# Patient Record
Sex: FEMALE | Race: BLACK OR AFRICAN AMERICAN | NOT HISPANIC OR LATINO | Employment: UNEMPLOYED | URBAN - METROPOLITAN AREA
[De-identification: names, ages, dates, MRNs, and addresses within clinical notes are randomized per-mention and may not be internally consistent; named-entity substitution may affect disease eponyms.]

---

## 2021-04-12 ENCOUNTER — IMMUNIZATION (OUTPATIENT)
Dept: PHARMACY | Facility: CLINIC | Age: 46
End: 2021-04-12
Payer: MEDICAID

## 2021-04-12 DIAGNOSIS — Z23 NEED FOR VACCINATION: Primary | ICD-10-CM

## 2021-05-10 ENCOUNTER — IMMUNIZATION (OUTPATIENT)
Dept: PHARMACY | Facility: CLINIC | Age: 46
End: 2021-05-10
Payer: MEDICAID

## 2021-05-10 DIAGNOSIS — Z23 NEED FOR VACCINATION: Primary | ICD-10-CM

## 2021-05-28 DIAGNOSIS — Z12.31 SCREENING MAMMOGRAM, ENCOUNTER FOR: Primary | ICD-10-CM

## 2021-06-02 ENCOUNTER — HOSPITAL ENCOUNTER (OUTPATIENT)
Dept: RADIOLOGY | Facility: HOSPITAL | Age: 46
Discharge: HOME | End: 2021-06-02
Attending: NURSE PRACTITIONER
Payer: MEDICAID

## 2021-06-02 VITALS — WEIGHT: 205 LBS | HEIGHT: 72 IN | BODY MASS INDEX: 27.77 KG/M2

## 2021-06-02 DIAGNOSIS — Z12.31 SCREENING MAMMOGRAM, ENCOUNTER FOR: ICD-10-CM

## 2021-06-02 PROCEDURE — 77063 BREAST TOMOSYNTHESIS BI: CPT | Mod: 26,,, | Performed by: RADIOLOGY

## 2021-06-02 PROCEDURE — 77063 MAMMO DIGITAL SCREENING BILAT WITH TOMO: ICD-10-PCS | Mod: 26,,, | Performed by: RADIOLOGY

## 2021-06-02 PROCEDURE — 77067 MAMMO DIGITAL SCREENING BILAT WITH TOMO: ICD-10-PCS | Mod: 26,,, | Performed by: RADIOLOGY

## 2021-06-02 PROCEDURE — 77067 SCR MAMMO BI INCL CAD: CPT | Mod: TC

## 2021-06-02 PROCEDURE — 77067 SCR MAMMO BI INCL CAD: CPT | Mod: 26,,, | Performed by: RADIOLOGY

## 2021-08-04 DIAGNOSIS — Z13.1 SCREENING FOR DIABETES MELLITUS: ICD-10-CM

## 2021-08-04 DIAGNOSIS — I65.29 INTERNAL CAROTID ARTERY OCCLUSION, UNSPECIFIED LATERALITY: Primary | ICD-10-CM

## 2021-08-06 DIAGNOSIS — E05.90 HYPERTHYROIDISM: Primary | ICD-10-CM

## 2021-09-21 PROBLEM — G44.209 TENSION HEADACHE: Status: ACTIVE | Noted: 2021-09-21

## 2021-09-21 PROBLEM — M54.2 NECK PAIN: Status: ACTIVE | Noted: 2021-09-21

## 2021-09-21 PROBLEM — G44.86 CERVICOGENIC HEADACHE: Status: ACTIVE | Noted: 2021-09-21

## 2021-12-16 PROBLEM — E55.9 VITAMIN D DEFICIENCY: Chronic | Status: ACTIVE | Noted: 2021-12-16

## 2021-12-16 PROBLEM — G44.86 CERVICOGENIC HEADACHE: Status: RESOLVED | Noted: 2021-09-21 | Resolved: 2021-12-16

## 2021-12-16 PROBLEM — F41.9 ANXIETY: Chronic | Status: ACTIVE | Noted: 2021-12-16

## 2021-12-16 PROBLEM — J32.9 CHRONIC SINUSITIS: Chronic | Status: ACTIVE | Noted: 2021-12-16

## 2021-12-16 PROBLEM — G44.209 TENSION HEADACHE: Status: RESOLVED | Noted: 2021-09-21 | Resolved: 2021-12-16

## 2021-12-16 PROBLEM — M54.2 NECK PAIN: Status: RESOLVED | Noted: 2021-09-21 | Resolved: 2021-12-16

## 2021-12-16 PROBLEM — E78.00 PURE HYPERCHOLESTEROLEMIA: Chronic | Status: ACTIVE | Noted: 2021-12-16

## 2021-12-16 PROBLEM — K21.9 GASTROESOPHAGEAL REFLUX DISEASE WITHOUT ESOPHAGITIS: Chronic | Status: ACTIVE | Noted: 2021-12-16

## 2021-12-16 PROBLEM — E11.65 UNCONTROLLED TYPE 2 DIABETES MELLITUS WITH HYPERGLYCEMIA: Chronic | Status: ACTIVE | Noted: 2021-12-16

## 2021-12-16 PROBLEM — G43.009 MIGRAINE WITHOUT AURA AND WITHOUT STATUS MIGRAINOSUS, NOT INTRACTABLE: Chronic | Status: ACTIVE | Noted: 2021-12-16

## 2021-12-29 ENCOUNTER — IMMUNIZATION (OUTPATIENT)
Dept: INTERNAL MEDICINE | Facility: CLINIC | Age: 46
End: 2021-12-29
Payer: MEDICAID

## 2021-12-29 DIAGNOSIS — Z23 NEED FOR VACCINATION: Primary | ICD-10-CM

## 2021-12-29 PROCEDURE — 0064A COVID-19, MRNA, LNP-S, PF, 100 MCG/0.25 ML DOSE VACCINE (MODERNA BOOSTER): CPT | Mod: PBBFAC,CV19

## 2022-01-05 PROBLEM — I10 PRIMARY HYPERTENSION: Chronic | Status: ACTIVE | Noted: 2022-01-05

## 2022-01-07 ENCOUNTER — TELEPHONE (OUTPATIENT)
Dept: DIABETES | Facility: CLINIC | Age: 47
End: 2022-01-07
Payer: MEDICAID

## 2022-01-07 NOTE — TELEPHONE ENCOUNTER
----- Message from Radha Riley sent at 1/7/2022  8:18 AM CST -----  Regarding: Sooner apointment  Contact: [phone]  Calling in regards to sooner appointment than scheduled. Please call

## 2022-02-21 ENCOUNTER — OFFICE VISIT (OUTPATIENT)
Dept: DIABETES | Facility: CLINIC | Age: 47
End: 2022-02-21
Payer: MEDICAID

## 2022-02-21 ENCOUNTER — OFFICE VISIT (OUTPATIENT)
Dept: SLEEP MEDICINE | Facility: CLINIC | Age: 47
End: 2022-02-21
Payer: MEDICAID

## 2022-02-21 VITALS
SYSTOLIC BLOOD PRESSURE: 122 MMHG | WEIGHT: 215.5 LBS | DIASTOLIC BLOOD PRESSURE: 72 MMHG | BODY MASS INDEX: 29.19 KG/M2 | HEIGHT: 72 IN | TEMPERATURE: 99 F | OXYGEN SATURATION: 98 % | HEART RATE: 92 BPM

## 2022-02-21 VITALS
DIASTOLIC BLOOD PRESSURE: 75 MMHG | HEART RATE: 91 BPM | BODY MASS INDEX: 29.18 KG/M2 | WEIGHT: 215.19 LBS | SYSTOLIC BLOOD PRESSURE: 115 MMHG

## 2022-02-21 DIAGNOSIS — E78.00 PURE HYPERCHOLESTEROLEMIA: Chronic | ICD-10-CM

## 2022-02-21 DIAGNOSIS — E89.0 HISTORY OF THYROIDECTOMY, TOTAL: ICD-10-CM

## 2022-02-21 DIAGNOSIS — E11.42 TYPE 2 DIABETES MELLITUS WITH DIABETIC POLYNEUROPATHY, WITH LONG-TERM CURRENT USE OF INSULIN: ICD-10-CM

## 2022-02-21 DIAGNOSIS — E66.3 OVERWEIGHT (BMI 25.0-29.9): ICD-10-CM

## 2022-02-21 DIAGNOSIS — E89.0 POSTOPERATIVE HYPOTHYROIDISM: ICD-10-CM

## 2022-02-21 DIAGNOSIS — Z71.9 HEALTH EDUCATION/COUNSELING: ICD-10-CM

## 2022-02-21 DIAGNOSIS — G47.10 HYPERSOMNOLENCE: Primary | ICD-10-CM

## 2022-02-21 DIAGNOSIS — Z79.4 TYPE 2 DIABETES MELLITUS WITH DIABETIC POLYNEUROPATHY, WITH LONG-TERM CURRENT USE OF INSULIN: ICD-10-CM

## 2022-02-21 DIAGNOSIS — E11.65 UNCONTROLLED TYPE 2 DIABETES MELLITUS WITH HYPERGLYCEMIA: Primary | Chronic | ICD-10-CM

## 2022-02-21 DIAGNOSIS — I10 PRIMARY HYPERTENSION: Chronic | ICD-10-CM

## 2022-02-21 DIAGNOSIS — R35.1 NOCTURIA: ICD-10-CM

## 2022-02-21 DIAGNOSIS — R06.83 SNORING: ICD-10-CM

## 2022-02-21 PROBLEM — Z98.890 HISTORY OF THYROIDECTOMY, TOTAL: Status: ACTIVE | Noted: 2022-02-21

## 2022-02-21 PROBLEM — Z90.89 HISTORY OF THYROIDECTOMY, TOTAL: Status: ACTIVE | Noted: 2022-02-21

## 2022-02-21 PROCEDURE — 99205 OFFICE O/P NEW HI 60 MIN: CPT | Mod: S$PBB,,, | Performed by: NURSE PRACTITIONER

## 2022-02-21 PROCEDURE — 4010F PR ACE/ARB THEARPY RXD/TAKEN: ICD-10-PCS | Mod: CPTII,,, | Performed by: INTERNAL MEDICINE

## 2022-02-21 PROCEDURE — 99213 PR OFFICE/OUTPT VISIT, EST, LEVL III, 20-29 MIN: ICD-10-PCS | Mod: S$PBB,,, | Performed by: INTERNAL MEDICINE

## 2022-02-21 PROCEDURE — 1159F PR MEDICATION LIST DOCUMENTED IN MEDICAL RECORD: ICD-10-PCS | Mod: CPTII,,, | Performed by: INTERNAL MEDICINE

## 2022-02-21 PROCEDURE — 99999 PR PBB SHADOW E&M-EST. PATIENT-LVL V: CPT | Mod: PBBFAC,,, | Performed by: NURSE PRACTITIONER

## 2022-02-21 PROCEDURE — 3078F PR MOST RECENT DIASTOLIC BLOOD PRESSURE < 80 MM HG: ICD-10-PCS | Mod: CPTII,,, | Performed by: INTERNAL MEDICINE

## 2022-02-21 PROCEDURE — 99213 OFFICE O/P EST LOW 20 MIN: CPT | Mod: S$PBB,,, | Performed by: INTERNAL MEDICINE

## 2022-02-21 PROCEDURE — 3074F SYST BP LT 130 MM HG: CPT | Mod: CPTII,,, | Performed by: NURSE PRACTITIONER

## 2022-02-21 PROCEDURE — 3008F PR BODY MASS INDEX (BMI) DOCUMENTED: ICD-10-PCS | Mod: CPTII,,, | Performed by: INTERNAL MEDICINE

## 2022-02-21 PROCEDURE — 3008F BODY MASS INDEX DOCD: CPT | Mod: CPTII,,, | Performed by: INTERNAL MEDICINE

## 2022-02-21 PROCEDURE — 99999 PR PBB SHADOW E&M-EST. PATIENT-LVL III: ICD-10-PCS | Mod: PBBFAC,,, | Performed by: INTERNAL MEDICINE

## 2022-02-21 PROCEDURE — 3074F PR MOST RECENT SYSTOLIC BLOOD PRESSURE < 130 MM HG: ICD-10-PCS | Mod: CPTII,,, | Performed by: INTERNAL MEDICINE

## 2022-02-21 PROCEDURE — 99213 OFFICE O/P EST LOW 20 MIN: CPT | Mod: PBBFAC,27 | Performed by: INTERNAL MEDICINE

## 2022-02-21 PROCEDURE — 1160F PR REVIEW ALL MEDS BY PRESCRIBER/CLIN PHARMACIST DOCUMENTED: ICD-10-PCS | Mod: CPTII,,, | Performed by: NURSE PRACTITIONER

## 2022-02-21 PROCEDURE — 99215 OFFICE O/P EST HI 40 MIN: CPT | Mod: PBBFAC,PN | Performed by: NURSE PRACTITIONER

## 2022-02-21 PROCEDURE — 4010F ACE/ARB THERAPY RXD/TAKEN: CPT | Mod: CPTII,,, | Performed by: NURSE PRACTITIONER

## 2022-02-21 PROCEDURE — 3074F SYST BP LT 130 MM HG: CPT | Mod: CPTII,,, | Performed by: INTERNAL MEDICINE

## 2022-02-21 PROCEDURE — 99999 PR PBB SHADOW E&M-EST. PATIENT-LVL V: ICD-10-PCS | Mod: PBBFAC,,, | Performed by: NURSE PRACTITIONER

## 2022-02-21 PROCEDURE — 99999 PR PBB SHADOW E&M-EST. PATIENT-LVL III: CPT | Mod: PBBFAC,,, | Performed by: INTERNAL MEDICINE

## 2022-02-21 PROCEDURE — 1160F RVW MEDS BY RX/DR IN RCRD: CPT | Mod: CPTII,,, | Performed by: NURSE PRACTITIONER

## 2022-02-21 PROCEDURE — 99205 PR OFFICE/OUTPT VISIT, NEW, LEVL V, 60-74 MIN: ICD-10-PCS | Mod: S$PBB,,, | Performed by: NURSE PRACTITIONER

## 2022-02-21 PROCEDURE — 1159F MED LIST DOCD IN RCRD: CPT | Mod: CPTII,,, | Performed by: NURSE PRACTITIONER

## 2022-02-21 PROCEDURE — 3078F DIAST BP <80 MM HG: CPT | Mod: CPTII,,, | Performed by: INTERNAL MEDICINE

## 2022-02-21 PROCEDURE — 3078F DIAST BP <80 MM HG: CPT | Mod: CPTII,,, | Performed by: NURSE PRACTITIONER

## 2022-02-21 PROCEDURE — 3078F PR MOST RECENT DIASTOLIC BLOOD PRESSURE < 80 MM HG: ICD-10-PCS | Mod: CPTII,,, | Performed by: NURSE PRACTITIONER

## 2022-02-21 PROCEDURE — 3074F PR MOST RECENT SYSTOLIC BLOOD PRESSURE < 130 MM HG: ICD-10-PCS | Mod: CPTII,,, | Performed by: NURSE PRACTITIONER

## 2022-02-21 PROCEDURE — 3008F PR BODY MASS INDEX (BMI) DOCUMENTED: ICD-10-PCS | Mod: CPTII,,, | Performed by: NURSE PRACTITIONER

## 2022-02-21 PROCEDURE — 1159F PR MEDICATION LIST DOCUMENTED IN MEDICAL RECORD: ICD-10-PCS | Mod: CPTII,,, | Performed by: NURSE PRACTITIONER

## 2022-02-21 PROCEDURE — 4010F ACE/ARB THERAPY RXD/TAKEN: CPT | Mod: CPTII,,, | Performed by: INTERNAL MEDICINE

## 2022-02-21 PROCEDURE — 4010F PR ACE/ARB THEARPY RXD/TAKEN: ICD-10-PCS | Mod: CPTII,,, | Performed by: NURSE PRACTITIONER

## 2022-02-21 PROCEDURE — 1159F MED LIST DOCD IN RCRD: CPT | Mod: CPTII,,, | Performed by: INTERNAL MEDICINE

## 2022-02-21 PROCEDURE — 3008F BODY MASS INDEX DOCD: CPT | Mod: CPTII,,, | Performed by: NURSE PRACTITIONER

## 2022-02-21 RX ORDER — MONTELUKAST SODIUM 10 MG/1
10 TABLET ORAL DAILY
COMMUNITY
Start: 2022-02-09 | End: 2022-03-07 | Stop reason: SDUPTHER

## 2022-02-21 RX ORDER — GLUCAGON 3 MG/1
POWDER NASAL
Qty: 2 EACH | Refills: 1 | Status: SHIPPED | OUTPATIENT
Start: 2022-02-21 | End: 2022-03-07 | Stop reason: SDUPTHER

## 2022-02-21 RX ORDER — INSULIN GLARGINE 100 [IU]/ML
16 INJECTION, SOLUTION SUBCUTANEOUS NIGHTLY
Qty: 15 ML | Refills: 6 | Status: SHIPPED | OUTPATIENT
Start: 2022-02-21 | End: 2022-03-07 | Stop reason: SDUPTHER

## 2022-02-21 RX ORDER — INSULIN ASPART 100 [IU]/ML
INJECTION, SOLUTION INTRAVENOUS; SUBCUTANEOUS
Qty: 15 ML | Refills: 6 | Status: SHIPPED | OUTPATIENT
Start: 2022-02-21 | End: 2022-03-07 | Stop reason: SDUPTHER

## 2022-02-21 NOTE — PROGRESS NOTES
CC:   Chief Complaint   Patient presents with    Diabetes Mellitus       HPI: Javier Enciso is a 47 y.o. female presents for an initial visit today for the management of T2DM.     She was diagnosed with prediabetes in 2018 and was on Metformin, developed T2DM around 2020. She went to the ER in December 2021 and she was started on insulin therapy.     Family hx of diabetes: mother and father   Hospitalized for diabetes: denies   Insulin therapy: December 2021     No personal or FH of thyroid cancer or personal of pancreatic cancer or pancreatitis.     Hx of thyroidectomy due to cysts- January 2012-- not cancerous. LT4 150 mcg daily     She reports that her PCP Recently increased her Lantus to 15 units nightly from 10 units  She is interested in the dexcom personal CGM       DIABETES COMPLICATIONS: peripheral neuropathy      Diabetes Management Status    ASA:  Yes - 81 mg daily     Statin: Taking--Crestor 40 mg  ACE/ARB: Taking--losartan 50 mg      The 10-year ASCVD risk score (Tyler GRAYSON Jr., et al., 2013) is: 6.8%    Values used to calculate the score:      Age: 47 years      Sex: Female      Is Non- : Yes      Diabetic: Yes      Tobacco smoker: No      Systolic Blood Pressure: 122 mmHg      Is BP treated: Yes      HDL Cholesterol: 44 mg/dL      Total Cholesterol: 208 mg/dL      Screening or Prevention Patient's value Goal Complete/Controlled?   HgA1C Testing and Control   Lab Results   Component Value Date    HGBA1C 11.1 (H) 12/12/2021      Annually/Less than 8% No   Lipid profile : 02/07/2022 Annually Yes   LDL control Lab Results   Component Value Date    LDLCALC 146.6 02/07/2022    Annually/Less than 100 mg/dl  No   Nephropathy screening No results found for: LABMICR  No results found for: PROTEINUA Annually No   Blood pressure BP Readings from Last 1 Encounters:   02/21/22 122/72    Less than 140/90 Yes   Dilated retinal exam Most Recent Eye Exam Date: Not Found Annually No   Foot  exam   : 2022 Annually No       CURRENT A1C:    Hemoglobin A1C   Date Value Ref Range Status   2021 11.1 (H) 4.7 - 5.6 % Final   2021 6.4 (H) 4.0 - 5.6 % Final     Comment:     ADA Screening Guidelines:  5.7-6.4%  Consistent with prediabetes  >or=6.5%  Consistent with diabetes    High levels of fetal hemoglobin interfere with the HbA1C  assay. Heterozygous hemoglobin variants (HbS, HgC, etc)do  not significantly interfere with this assay.   However, presence of multiple variants may affect accuracy.     2021 6.1 (H) 4.0 - 5.6 % Final     Comment:     ADA Screening Guidelines:  5.7-6.4%  Consistent with prediabetes  >or=6.5%  Consistent with diabetes    High levels of fetal hemoglobin interfere with the HbA1C  assay. Heterozygous hemoglobin variants (HbS, HgC, etc)do  not significantly interfere with this assay.   However, presence of multiple variants may affect accuracy.     10/13/2020 6.7 (H) 4.7 - 5.6 % Final       GOAL A1C: 6.5% without hypoglycemia       DM MEDICATIONS USED IN THE PAST: Lantus, Novolog   Metformin -- wasn't working       CURRENT DIABETES MEDICATIONS: Lantus 15 units nightly (9PM) and Novolog 5 units TID AC   (usually only taking with lunch and dinner)   Insulin:  pens.    Missed doses: missing the AM dose of Novolog       BLOOD GLUCOSE MONITORING: checking her BG 6 times per day   No logs or meter to clinic today for review.   Per oral recall:   FB's --135-149   Pre lunch: highest-233-- usually running - >150   Pre dinner: 199, 170-190's   Pre bed: over 200       HYPOGLYCEMIA: yes- once to 60-- she dropped because she didn't eat   Glucagon kit: denies   Medic alert bracelet: denies       MEALS: eating 3 meals per day   BF: cup of coffee, oatmeal with waffle or wheat bread with egg- or Yann Levi's   Lunch: job pays for lunch--- fast food- or meat with veggie - or Burger and No fries   Dinner: veggie with meat -- rarely does rice -- spinach, Green[place]ic cut green beans or  broccoli   Snack: rarely   Drinks: -- previously was drinking cranberry juice -- she was doing sugary beverage until recently   So she stopped the juices   Water makes her own tea with sugar substitute          CURRENT EXERCISE:  No      Review of Systems  Review of Systems   Constitutional: Positive for fatigue. Negative for appetite change and unexpected weight change.   HENT: Negative for trouble swallowing.    Eyes: Negative for visual disturbance.   Respiratory: Negative for shortness of breath.    Cardiovascular: Negative for chest pain.   Gastrointestinal: Positive for nausea.   Endocrine: Positive for polyphagia. Negative for polydipsia and polyuria.   Genitourinary:        No Nocturia    Skin: Negative for wound.   Neurological: Positive for numbness.       Physical Exam   Physical Exam  Vitals and nursing note reviewed.   Constitutional:       Appearance: She is well-developed.      Comments: Overweight female   HENT:      Head: Normocephalic and atraumatic.      Right Ear: External ear normal.      Left Ear: External ear normal.      Nose: Nose normal.   Neck:      Thyroid: No thyromegaly.      Trachea: No tracheal deviation.   Cardiovascular:      Rate and Rhythm: Normal rate and regular rhythm.      Heart sounds: No murmur heard.  Pulmonary:      Effort: Pulmonary effort is normal. No respiratory distress.      Breath sounds: Normal breath sounds.   Abdominal:      Palpations: Abdomen is soft.      Tenderness: There is no abdominal tenderness.      Hernia: No hernia is present.   Musculoskeletal:      Cervical back: Normal range of motion and neck supple.   Skin:     General: Skin is warm and dry.      Capillary Refill: Capillary refill takes less than 2 seconds.      Findings: No rash.      Comments: Injection sites are normal appearing. No lipo hypertropthy or atrophy     Neurological:      Mental Status: She is alert and oriented to person, place, and time.      Cranial Nerves: No cranial nerve  deficit.   Psychiatric:         Behavior: Behavior normal.         Judgment: Judgment normal.         FOOT EXAMINATION: Appropriate footwear      Protective Sensation (w/ 10 gram monofilament):  Right: Intact  Left: Intact    Visual Inspection:  Normal -  Bilateral and Nails Intact - without Evidence of Foot Deformity- Bilateral    Pedal Pulses:   Right: Present  Left: Present    Posterior tibialis:   Right:Present  Left: Present        Lab Results   Component Value Date    TSH 0.768 02/07/2022           Uncontrolled type 2 diabetes mellitus with hyperglycemia  Uncontrolled    reportedly blood sugar readings are above goal   will get labs prior to RTC to rule out type 1 diabetes-- we discussed then we could explore other treatment options such as GLP1 and SGLT2         -- Medication Changes:   Adjust your Lantus to 16 units nightly   Adjust your Novolog to 5 units with breakfast, 6 units with lunch, and 6 units with dinner     Start using correction scale.       150-200: +1 unit  201-250: +2 units  251-300: +3 units  301-350: +4 units  >350: +5 units     discussed holding the NovoLog if her blood sugar is less than 100 or if she skips a meal      Will submit for the dexcom personal CGM         -- Reviewed goals of therapy are to get the best control we can without hypoglycemia.  -- Reviewed patient's current insulin regimen. Clarified proper insulin dose and timing in relation to meals, etc. Insulin injection sites and proper rotation instructed.    -- Advised frequent self blood glucose monitoring.  Patient encouraged to document glucose results and bring them to every clinic visit.  Continue to monitor blood sugars 4 times per day.  Will submit for the Dexcom personal CGM to [facility].  Today in clinic we placed a Dexcom sample for patient to try  -- Hypoglycemia precautions discussed. Instructed on precautions before driving.    -- Call for Bg repeatedly < 70 or > 180.   -- Close adherence to lifestyle changes  recommended.   -- Periodic follow ups for eye evaluations, foot care and dental care suggested.  -- Refer to diabetes education-- dexcom start     Patient has diabetes mellitus and manages diabetes with intensive insulin regimen and uses prandial and basal insulin daily  Patient requires a therapeutic CGM and is willing to use therapeutic CGM for the necessary frequent adjustments of insulin therapy.  I have completed an in-person visit during the previous 6 months and will continue to have in-person visits every 6 months to assess adherence to their CGM regimen and diabetes treatment plan.  Due to COVID pandemic and need for remote monitoring this tool is medically necessary          Type 2 diabetes mellitus with diabetic polyneuropathy, with long-term current use of insulin  Optimize BG readings.   See above.    she is following with an external podiatrist    Educated patient to check feet daily for any foreign objects and/or wounds. Discussed with patient the importance of wearing appropriate footwear at all times, not to walk barefoot ever, and to check shoes before putting them on feet. Instructed patient to keep feet dry by regularly changing shoes and socks and drying feet after baths and exercises. Also, instructed patient to report any new lesions, discolorations, or swelling to a healthcare professional.        Primary hypertension  BP goal is < 140/90.   Tolerating ARB  Controlled   Blood pressure goals discussed with patient      Pure hypercholesterolemia  On statin per ADA recommendations  LDL goal < 100.   Crestor was recently adjusted per PCP         Overweight (BMI 25.0-29.9)  Body mass index is 29.23 kg/m².  Increases insulin resistance.   Discussed DM diet and exercise.       History of thyroidectomy, total  On LT4   Reports cysts that were removed were  benign    Postoperative hypothyroidism  TSH WNL, on LT4 150 mcg, reinforced take by itself on empty stomach, first thing in the morning and wait  at least 30-60 minutes to eat, drink, or take other medications.          Spent 60 minutes with patient with >50% time spent in counseling on  Diet, low-carbohydrate snacks, insulin administration, Dexcom, self blood glucose monitoring, hypoglycemia    printed material also provided to patient         Follow up in about 6 weeks (around 4/4/2022).  Submit dexcom paperwork to [facility] please   Dexcom sample today please   See rylan for dexcom start in 4-5 weeks   F/u with me in 6 -8 weeks for blood sugar log review with labs prior       Orders Placed This Encounter   Procedures    Hemoglobin A1C     Standing Status:   Future     Standing Expiration Date:   8/21/2023    C-Peptide     Standing Status:   Future     Standing Expiration Date:   8/21/2023    Anti-islet cell antibody     Standing Status:   Future     Standing Expiration Date:   8/21/2023    Glutamic Acid Decarboxylase     Standing Status:   Future     Standing Expiration Date:   8/21/2023    Microalbumin/Creatinine Ratio, Urine     Standing Status:   Future     Standing Expiration Date:   8/21/2023     Order Specific Question:   Specimen Source     Answer:   Urine    Comprehensive Metabolic Panel     Standing Status:   Future     Standing Expiration Date:   8/21/2023    Ambulatory referral/consult to Diabetes Education     Standing Status:   Future     Standing Expiration Date:   2/21/2023     Referral Priority:   Routine     Referral Type:   Consultation     Referral Reason:   Specialty Services Required     Referred to Provider:   yRlan Espinosa RD, CDE     Requested Specialty:   Diabetes     Number of Visits Requested:   1       Recommendations were discussed with the patient in detail  The patient verbalized understanding and agrees with the plan outlined as above.     This note was partly generated with [employer] voice recognition software. I apologize for any possible typographical errors.

## 2022-02-21 NOTE — PATIENT INSTRUCTIONS
Adjust your Lantus to 16 units nightly   Adjust your Novolog to 5 units with breakfast, 6 units with lunch, and 6 units with dinner     Start using correction scale.       150-200: +1 unit  201-250: +2 units  251-300: +3 units  301-350: +4 units  >350: +5 units      Will submit for the dexcom personal CGM               Snacks can be an important part of a balanced, healthy meal plan. They allow you to eat more frequently, feeling full and satisfied throughout the day. Also, they allow you to spread carbohydrates evenly, which may stabilize blood sugars.  Plus, snacks are enjoyable!     The amount of carbohydrate needed at snacks varies. Generally, less than 15 grams of carbohydrate per snack is recommended.  Below you will find some tasty treats.       0-5 gm carb  Crystal Light  Vitamin Water Zero  Herbal tea, unsweetened  2 tsp peanut butter on celery  1./2 cup sugar-free jell-o  1 sugar-free popsicle  ¼ cup blueberries  8oz Blue Lora unsweetened almond milk  5 baby carrots & celery sticks, cucumbers, bell peppers dipped in ¼ cup salsa, 2Tbsp light ranch dressing or 2Tbsp plain Greek yogurt  10 Goldfish crackers  ½ oz low-fat cheese or string cheese  1 closed handful of nuts, unsalted  1 Tbsp of sunflower seeds, unsalted  1 cup Smart Pop popcorn  1 whole grain brown rice cake        15 gm carb  1 small piece of fruit or ½ banana or 1/2 cup lite canned fruit  3 francine cracker squares  3 cups Smart Pop popcorn, top spray butter, Valdez lite salt or cinnamon and Truvia  5 Vanilla Wafers  ½ cup low fat, no added sugar ice cream or frozen yogurt (Blue bell, Blue Bunny, Weight Watchers, Skinny Cow)  ½ turkey, ham, or chicken sandwich  ½ c fruit with ½ c Cottage cheese  4-6 unsalted wheat crackers with 1 oz low fat cheese or 1 tbsp peanut butter   30-45 goldfish crackers (depending on flavor)   7-8 Roman Catholic mini brown rice cakes (caramel, apple cinnamon, chocolate)   12 Roman Catholic mini brown rice cakes (cheddar, bbq, ranch)    1/3 cup hummus dip with raw veg  1/2 whole wheat michaela, 1Tbsp hummus  Mini Pizza (1/2 whole wheat English muffin, low-fat  cheese, tomato sauce)  100 calorie snack pack (Oreo, Chips Ahoy, Ritz Mix, Baked Cheetos)  4-6 oz. light or Greek Style yogurt (Chobani, Yoplait, Okios, [place])  ½ cup sugar-free pudding    6 in. wheat tortilla or michaela oven toasted chips (topped with spray butter flavoring, cinnamon, Truvia OR spray butter, garlic powder, chili powder)   18 BBQ Popchips (available at [employer], [employer], [employer])

## 2022-02-21 NOTE — ASSESSMENT & PLAN NOTE
TSH WNL, on LT4 150 mcg, reinforced take by itself on empty stomach, first thing in the morning and wait at least 30-60 minutes to eat, drink, or take other medications.

## 2022-02-21 NOTE — ASSESSMENT & PLAN NOTE
Uncontrolled    reportedly blood sugar readings are above goal   will get labs prior to RTC to rule out type 1 diabetes-- we discussed then we could explore other treatment options such as GLP1 and SGLT2         -- Medication Changes:   Adjust your Lantus to 16 units nightly   Adjust your Novolog to 5 units with breakfast, 6 units with lunch, and 6 units with dinner     Start using correction scale.       150-200: +1 unit  201-250: +2 units  251-300: +3 units  301-350: +4 units  >350: +5 units     discussed holding the NovoLog if her blood sugar is less than 100 or if she skips a meal      Will submit for the dexcom personal CGM         -- Reviewed goals of therapy are to get the best control we can without hypoglycemia.  -- Reviewed patient's current insulin regimen. Clarified proper insulin dose and timing in relation to meals, etc. Insulin injection sites and proper rotation instructed.    -- Advised frequent self blood glucose monitoring.  Patient encouraged to document glucose results and bring them to every clinic visit.  Continue to monitor blood sugars 4 times per day.  Will submit for the Dexcom personal CGM to [facility].  Today in clinic we placed a Dexcom sample for patient to try  -- Hypoglycemia precautions discussed. Instructed on precautions before driving.    -- Call for Bg repeatedly < 70 or > 180.   -- Close adherence to lifestyle changes recommended.   -- Periodic follow ups for eye evaluations, foot care and dental care suggested.  -- Refer to diabetes education-- dexcom start     Patient has diabetes mellitus and manages diabetes with intensive insulin regimen and uses prandial and basal insulin daily  Patient requires a therapeutic CGM and is willing to use therapeutic CGM for the necessary frequent adjustments of insulin therapy.  I have completed an in-person visit during the previous 6 months and will continue to have in-person visits every 6 months to assess adherence to their CGM regimen and  diabetes treatment plan.  Due to COVID pandemic and need for remote monitoring this tool is medically necessary

## 2022-02-21 NOTE — ASSESSMENT & PLAN NOTE
Optimize BG readings.   See above.    she is following with an external podiatrist    Educated patient to check feet daily for any foreign objects and/or wounds. Discussed with patient the importance of wearing appropriate footwear at all times, not to walk barefoot ever, and to check shoes before putting them on feet. Instructed patient to keep feet dry by regularly changing shoes and socks and drying feet after baths and exercises. Also, instructed patient to report any new lesions, discolorations, or swelling to a healthcare professional.

## 2022-02-21 NOTE — PROGRESS NOTES
Referred by Tootie Estrella NP     NEW PATIENT VISIT    Javier Enciso  is a pleasant 47 y.o. female  with PMH significant for migraine without aura, chronic sinusitis, HTN, DM, vit D def, GERD who presents today for sleepiness for the past few weeks.    SLEEP SCHEDULE   Bed Time 9:30P   Sleep Latency 30-45min   Arousals 3   Nocturia 3-4   Back to sleep 20-30min   Wake time 5 AM   Naps sometimes   Work        Vitals:    02/21/22 1303   BP: 115/75   BP Location: Right arm   Patient Position: Sitting   BP Method: Medium (Automatic)   Pulse: 91   Weight: 97.6 kg (215 lb 2.7 oz)     Physical Exam:    GEN:   Well-appearing  Psych:  Appropriate affect, demonstrates insight  SKIN:  No rash on the face or bridge of the nose    LABS:   No results found for: HGB, CO2    RECORDS REVIEWED PREVIOUSLY:    No prior sleep testing.    ASSESSMENT    No flowsheet data found.  PROBLEM DESCRIPTION/ Sx on Presentation  STATUS   possible ROBERTH   Snoring, unrefreshing sleep  HEENT: MP1,    New   Daytime Sx   Feeling sleepy the past few weeks  denies sleepiness when inactive (but could nap if has opportunity)  occasional sleepiness when driving, no accidents  ESS 12/24 on intake  New   Nocturia   3-4 times per night  New   Other issues:     PLAN     -will proceed with sleep testing   -discussed trial of PAP therapy if ROBERTH present   -driving precautions were discussed with the patient    RTC          The patient was given open opportunity to ask questions and/or express concerns about treatment plan.   All questions/concerns were discussed.     Two patient identifiers used prior to evaluation.

## 2022-02-22 ENCOUNTER — TELEPHONE (OUTPATIENT)
Dept: DIABETES | Facility: CLINIC | Age: 47
End: 2022-02-22
Payer: MEDICAID

## 2022-02-22 NOTE — TELEPHONE ENCOUNTER
----- Message from Brianna Sales sent at 2/22/2022 10:42 AM CST -----  Contact: [phone]  Pt [employer] machine isn't giving readings ! Machine is saying connection failed . Please f/u with pt

## 2022-03-02 ENCOUNTER — PATIENT MESSAGE (OUTPATIENT)
Dept: DIABETES | Facility: CLINIC | Age: 47
End: 2022-03-02
Payer: MEDICAID

## 2022-03-07 ENCOUNTER — TELEPHONE (OUTPATIENT)
Dept: SLEEP MEDICINE | Facility: OTHER | Age: 47
End: 2022-03-07
Payer: MEDICAID

## 2022-03-11 ENCOUNTER — TELEPHONE (OUTPATIENT)
Dept: SLEEP MEDICINE | Facility: OTHER | Age: 47
End: 2022-03-11
Payer: MEDICAID

## 2022-03-15 ENCOUNTER — TELEPHONE (OUTPATIENT)
Dept: SLEEP MEDICINE | Facility: OTHER | Age: 47
End: 2022-03-15
Payer: MEDICAID

## 2022-03-16 ENCOUNTER — HOSPITAL ENCOUNTER (OUTPATIENT)
Dept: SLEEP MEDICINE | Facility: OTHER | Age: 47
Discharge: HOME | End: 2022-03-16
Attending: INTERNAL MEDICINE
Payer: MEDICAID

## 2022-03-16 DIAGNOSIS — G47.10 HYPERSOMNOLENCE: ICD-10-CM

## 2022-03-16 DIAGNOSIS — R35.1 NOCTURIA: ICD-10-CM

## 2022-03-16 DIAGNOSIS — R06.83 SNORING: ICD-10-CM

## 2022-03-16 PROCEDURE — 95806 PR SLEEP STUDY, UNATTENDED, SIMUL RECORD HR/O2 SAT/RESP FLOW/RESP EFFT: ICD-10-PCS | Mod: 26,,, | Performed by: INTERNAL MEDICINE

## 2022-03-16 PROCEDURE — 95800 SLP STDY UNATTENDED: CPT

## 2022-03-16 PROCEDURE — 95806 SLEEP STUDY UNATT&RESP EFFT: CPT | Mod: 26,,, | Performed by: INTERNAL MEDICINE

## 2022-03-20 ENCOUNTER — PATIENT MESSAGE (OUTPATIENT)
Dept: SLEEP MEDICINE | Facility: CLINIC | Age: 47
End: 2022-03-20
Payer: MEDICAID

## 2022-03-22 DIAGNOSIS — G47.33 OSA (OBSTRUCTIVE SLEEP APNEA): Primary | ICD-10-CM

## 2022-04-14 ENCOUNTER — NUTRITION (OUTPATIENT)
Dept: DIABETES | Facility: CLINIC | Age: 47
End: 2022-04-14
Payer: MEDICAID

## 2022-04-14 DIAGNOSIS — E11.65 UNCONTROLLED TYPE 2 DIABETES MELLITUS WITH HYPERGLYCEMIA: Chronic | ICD-10-CM

## 2022-04-14 PROCEDURE — G0108 DIAB MANAGE TRN  PER INDIV: HCPCS | Mod: PBBFAC,PN | Performed by: DIETITIAN, REGISTERED

## 2022-04-18 NOTE — PROGRESS NOTES
Diabetes Care Specialist Progress Note  Author: Kati Espinosa RD, CDE  Date: 4/18/2022    Program Intake  Reason for Diabetes Program Visit:: Intervention  Type of Intervention:: Individual  Individual: Device Training  Device Training: Personal CGM  Current diabetes risk level:: low  In the last 12 months, have you:: none  Permission to speak with others about care:: no    Lab Results   Component Value Date    HGBA1C 6.7 (H) 04/14/2022       Clinical    Patient Health Rating  Compared to other people your age, how would you rate your health?: Good    Problem Review  Reviewed Problem List with Patient: yes  Active comorbidities affecting diabetes self-care.: yes  Comorbidities: Neuropathy  Reviewed health maintenance: yes    Clinical Assessment  Current Diabetes Treatment: Insulin  Have you ever experienced hypoglycemia (low blood sugar)?: yes  In the last month, how often have you experienced low blood sugar?: other (see comments) (only once when skipped a meal)  Are you able to tell when your blood sugar is low?: Yes  What symptoms do you experience?: Shaky  Have you ever been hospitalized because your blood sugar was too low?: no  How do you treat hypoglycemia (low blood sugar)?: 1/2 can soda/fruit juice, other (eat something sweet)  Have you ever experienced hyperglycemia (high blood sugar)?: yes  In the last month, how often have you experienced high blood sugar?: more than once a week  Are you able to tell when your blood sugar is high?: No (comment)  Have you ever been hospitalized because your blood sugar was high?: no    Medication Information  How do you obtain your medications?: Patient drives  How many days a week do you miss your medications?: Never  Do you use a pill box or medication chart to help you manage your medications?: No  Do you sometimes have difficulty refilling your medications?: No  Medication adherence impacting ability to self-manage diabetes?: No    Labs  Do you have regular lab work  to monitor your medications?: Yes  Type of Regular Lab Work: A1c, Cholesterol, CBC, Microalbumin, BMP  Where do you get your labs drawn?: [facility]  Lab Compliance Barriers: No    Nutritional Status  Diet: Regular  Meal Plan 24 Hour Recall: Breakfast, Lunch, Dinner, Snack  Meal Plan 24 Hour Recall - Breakfast: cup of coffee, oatmeal with waffle or wheat bread with egg, or Yann [employer] breakfast  Meal Plan 24 Hour Recall - Lunch: fast food usually or meat with veggie or a burger without fries  Meal Plan 24 Hour Recall - Dinner: usually a veggie with meat, rarely will eat rice, eats mostly spinach, Bulgarian cut green beans or broccoli  Meal Plan 24 Hour Recall - Snack: rarely, Drinks: quit drinking cranberry juice and sugary beverages, now drinks water makes and tea with sugar substitute  Change in appetite?: No  Dentation:: Intact  Recent Changes in Weight: No Recent Weight Change  Current nutritional status an area of need that is impacting patient's ability to self-manage diabetes?: No    Additional Social History    Support  Does anyone support you with your diabetes care?: yes  Who supports you?: self, family member  Who takes you to your medical appointments?: self  Does the current support meet the patient's needs?: Yes  Is Support an area impacting ability to self-manage diabetes?: No    Access to Mass Media & Technology  Does the patient have access to any of the following devices or technologies?: Smart phone  Media or technology needs impacting ability to self-manage diabetes?: No    Cognitive/Behavioral Health  Alert and Oriented: Yes  Difficulty Thinking: No  Requires Prompting: No  Requires assistance for routine expression?: No  Cognitive or behavioral barriers impacting ability to self-manage diabetes?: No    Culture/Orthodox  Culture or Alevism beliefs that may impact ability to access healthcare: No    Communication  Language preference: English  Hearing Problems: No  Vision Problems: No  Communication  needs impacting ability to self-manage diabetes?: No    Health Literacy  Preferred Learning Method: Face to Face  How often do you need to have someone help you read instructions, pamphlets, or written material from your doctor or pharmacy?: Never  Health literacy needs impacting ability to self-manage diabetes?: No      Diabetes Self-Management Skills Assessment    Diabetes Disease Process/Treatment Options  Patient/caregiver able to state what happens when someone has diabetes.: yes  Patient/caregiver knows what type of diabetes they have.: yes  Diabetes Type : Type II  Patient/caregiver able to identify at least three signs and symptoms of diabetes.: yes  Identified signs and symptoms:: fatigue, increased thirst  Patient able to identify at least three risk factors for diabetes.: yes  Identified risk factors:: reduced activity, being overweight, family history  Diabetes Disease Process/Treatment Options: Skills Assessment Completed: Yes  Assessment indicates:: Adequate understanding  Area of need?: No    Nutrition/Healthy Eating  Challenges to healthy eating:: portion control, eating out, going to parties  Method of carbohydrate measurement:: carb counting/reading labels  Patient can identify foods that impact blood sugar.: yes  Patient-identified foods:: starches (bread, pasta, rice, cereal), soda, sweets, starchy vegetables (corn, peas, beans), fruit/fruit juice, milk, yogurt  Nutrition/Healthy Eating Skills Assessment Completed:: Yes  Assessment indicates:: Adequate understanding  Area of need?: No    Physical Activity/Exercise  Patient's daily activity level:: lightly active  Patient formally exercises outside of work.: no  Reasons for not exercising:: time constraints  Patient can identify forms of physical activity.: yes  Stated forms of physical activity:: moving to burn calories  Patient can identify reasons why exercise/physical activity is important in diabetes management.: yes  Identified reasons::  tones muscles, other (see comments) (lose weight)  Physical Activity/Exercise Skills Assessment Completed: : Yes  Assessment indicates:: Adequate understanding (needs encouragement to start exercising)  Area of need?: No    Medications  Patient is able to describe current diabetes management routine.: yes  Diabetes management routine:: insulin, diet  Patient is able to identify current diabetes medications, dosages, and appropriate timing of medications.: yes  Patient understands the purpose of the medications taken for diabetes.: yes  Patient reports problems or concerns with current medication regimen.: no  Medication Skills Assessment Completed:: Yes  Assessment indicates:: Adequate understanding  Area of need?: No    Home Blood Glucose Monitoring  Patient states that blood sugar is checked at home daily.: yes  Monitoring Method:: home glucometer, personal continuous glucose monitor  How often do you check your blood sugar?: 4 times a day  When do you check your blood sugar?: Before breakfast, Before lunch, Before dinner, Before bedtime  When you check what is your typical blood sugar range? : 100-200s  Blood glucose logs:: no, encouraged to keep logs, encouraged to bring logs to provider visits  Blood glucose logs reviewed today?: no  Personal CGM type:: Dexcom  Patient is able to use personal CGM appropriately.: no  CGM Report reviewed?: no  Unable to download personal CGM: starting at time of visit  Home Blood Glucose Monitoring Skills Assessment Completed: : Yes  Assessment indicates:: Instruction Needed  Area of need?: Yes    Acute Complications  Patient is able to identify types of acute complications: Yes  Patient Identified:: Hypoglycemia, Hyperglycemia  Patient is able to state the basic meaning of hypoglycemia?: Yes  Able to state the blood sugar range for hypoglycemia?: yes  Patient stated range:: <70  Patient can identify general symptoms of hypoglycemia: yes  Patient identified:: shakiness  Able to  state proper treatment of hypoglycemia?: yes  Patient identified:: 1/2 can soda/fruit juice, other (see comments) (eat something sweet)  Patient is able to state the basic meaning of hyperglycemia?: Yes  Able to state the blood sugar range for hyperglycemia?: yes  Patient stated range:: >200  Patient able to state proper treatment of hyperglycemia?: yes  Patient identified:: take medication as recommended  Patient able to verbalize sick day plan?: no  Acute Complications Skills Assessment Completed: : Yes  Assessment indicates:: Instruction Needed (on sick day planning and disaster preparedness)  Area of need?: Yes    Chronic Complications  Patient can identify major chronic complications of diabetes.: yes  Stated chronic complications:: kidney disease, neuropathy/nerve damage, retinopathy  Patient can identify ways to prevent or delay diabetes complications.: yes  Stated ways to prevent complications:: healthy eating and regular activity, maintaining optimal blood glucose control, controlling blood sugar  Patient is aware that having diabetes increases risk of heart disease?: No  Patient is aware that heart disease is the leading cause of death and disability in people with diabetes?: No  Patient able to state risk factors for heart disease?: Yes  Patient stated risk factors for heart disease:: High blood pressure, High cholesterol  Patient is taking statin?: Yes  Chronic Complications Skills Assessment Completed: : Yes  Assessment indicates:: Instruction Needed  Area of need?: Yes    Psychosocial/Coping  Patient can identify ways of coping with chronic disease.: yes  Patient-stated ways of coping with chronic disease:: counseling/actively seeing behavioral professional  Psychosocial/Coping Skills Assessment Completed: : Yes  Assessment indicates:: Adequate understanding  Area of need?: No      Diabetes Self Support Plan    Diabetes Self-Management Support Plan  Stress management:: family, friends  Review status::  Patient has selected and agrees to support plan., Patient was provided Diabetes Self-Management Support Plan document that includes support options.    Assessment Summary and Plan    Based on today's diabetes care assessment, the following areas of need were identified:      Social 4/14/2022   Support No   Access to Mass Media/Tech No   Cognitive/Behavioral Health No   Culture/Moravian No   Communication No   Health Literacy No        Clinical 4/14/2022   Medication Adherence No   Lab Compliance No   Nutritional Status No        Diabetes Self-Management Skills 4/14/2022   Diabetes Disease Process/Treatment Options No   Nutrition/Healthy Eating No   Physical Activity/Exercise No   Medication No   Home Blood Glucose Monitoring Yes   Acute Complications Yes   Chronic Complications Yes   Psychosocial/Coping No          Today's interventions were provided through individual discussion, instruction, and written materials were provided.      Patient verbalized understanding of instruction and written materials.  Pt was able to return back demonstration of instructions today. Patient understood key points, needs reinforcement and further instruction.     Diabetes Self-Management Care Plan:    Today's Diabetes Self-Management Care Plan was developed with Javier's input. Javier has agreed to work toward the following goal(s) to improve his/her overall diabetes control.      Care Plan: Diabetes Management   Updates made since 3/19/2022 12:00 AM      Problem: Acute Complications       Goal: Patient agrees to identify and manage signs and symptoms of high/low blood sugar (hyper/hypoglycemia) by keeping a log of events and using proper treatment.    Start Date: 4/14/2022   Expected End Date: 5/14/2022   This Visit's Progress: Deferred   Priority: High   Barriers: No Barriers Identified   Note:    Hyperglycemia  ABC's of Diabetes    Discussed importance of A1c less than 6.0 to reduce risk of micro and macro complications,  controlled Blood Pressure 130/80 and Cholesterol Lab Values--Total Cholesterol <200mg, LDL < 100, HDL >45 men and >50 women, Triglycerides <150mg for prevention heart disease, heart attack, stroke.   how to use a glucometer   reviewed understanding diabetes distress   reviewed current level and goal level for HgbA1c, blood glucose, microalbumin, and lipids   reviewed signs/symptoms of hyperglycemia   reviewed what level blood sugar is considered high    reviewed at what level to contact the doctor and/or go to the emergency room.    Reviewed what patient can do to decrease blood sugar (ie drink more water, exercise, take medication as prescribed, monitor blood glucose)     Hypoglycemia  Reviewed blood glucose goals, prevention, detection, and treatment of hypoglycemia, and when to contact the clinic.   reviewed signs/symptoms of hypoglycemia   reviewed what level blood sugar is considered low    reviewed at what level to contact the doctor and/or go to the emergency room.    Reviewed what patient can do to increase blood sugar (ie drink juice or ½ can soda, eat 5-6 pieces of candy, 4 glucose tablets, 1 tbsp sugar or honey, glucagon)   Reviewed when glucagon should be used   Reviewed how to use glucagon device (injections and inhaled)       Task: Reviewed proper treatment of hypoglycemia with the rule of 15--patient to eat 15g simple carbohydrate (4 glucose tablets, 1 glucose gel, 5 pieces hard candy, ½ cup fruit juice, ½ can regular soda, etc) and wait 15 minutes and recheck home glucose. Completed 4/18/2022      Task: Reviewed common causes and precautions to help prevent hyper/hypoglycemic events. Completed 4/18/2022      Task: Reviewed signs and symptoms of hyper/hypoglycemia, what range is considered to be hyper/hypoglycemia, and when to seek further medical attention. Completed 4/18/2022      Task: Discussed, sick day planning, natural disaster planning, and/or travel planning to prevent  hyper/hypoglycemia. Completed 4/18/2022      Task: Discussed risk factors for developing diabetic ketoacidosis (DKA), strategies for reducing risk, testing with ketone test strips if BG is >240mg/dl, basic protocol for managing DKA, and when to seek further medical attention. Completed 4/18/2022      Problem: Chronic Complications       Goal: Patient agrees to have the following diabetes ruben maintenance screenings/appointments eye exam completed in the next 6 months.    Start Date: 4/14/2022   Expected End Date: 10/14/2022   This Visit's Progress: Deferred   Priority: Medium   Barriers: No Barriers Identified   Note:    Diabetes Care Schedule    A1c every 3 to 6 months   Lipid Panel every year   Microalbumin (urine test) once per year   Comprehensive Foot Exam once per year   Dilated Eye Exam at least once per year   Dental exam every 6 months   Flu Vaccination yearly    Shingles and Pneumonia Vaccination as recommended by physician      Reviewed diabetes care schedule, foot care guidelines, diabetes and retinopathy screening, s/s hypo and hyperglycemia, long/short term complication of uncontrolled DM, importance of compliance with treatment plan    Reviewed risk of heart disease   High blood pressure   High cholesterol   Diet   Limited activity   Medication non-adherence   Having diabetes    Reviewed that heart disease is the leading cause of death in people with uncontrolled diabetes  Reviewed ways to prevent complications:   Avoid smoking and other types of tobacco   Checking feet daily and having routine comprehensive foot exams   Controlling blood sugar   Controlling cholesterol and triglycerides   Having regular diabetic eye exams   Healthy eating and regular activity   Maintaining optimal blood glucose control       Task: Discussed current A1c, Blood Pressure, and Cholesterol results (ABCs of Diabetes) and reviewed targets of each.       Task: Discussed importance of annual  "microalbumin urine test to monitor kidney function.       Task: Discussed importance of annual dilated eye exam for prevention of retinopathy.       Task: Discussed the importance of routine dental exams for the prevention of gum disease and gingivitis and encouraged dental exam every 6 months.       Task: Instructed on basic daily foot care and encouraged inspecting feet daily.       Task: Discussed importance of the Flu and Pneumonia Vaccination.       Task: Scheduled pt for the following health maintenance screenings today:         Dexcom G6 Education  Patient is here in clinic today for initial start of Dexcom continuous glucose monitoring system (CGMA). Reviewed with patient how to insert sensor and transmitter. Patient inserted sensor on right abdomen and inserted transmitter. Time and date was set on monitor and settings were set. Hypoglycemia trigger set for 70 and hyperglycemia set for 250. Patient provided with phone number for 24-hour help line if needed. Discussed various types of possible alarms and what to do. Questions addressed. Initialization finished. No futher questions.  Patient referred to clinic today for Dexcom G6 continuous glucose sensor system training.  Education was provided using "Quick Start Guide" per Dexcom protocol.     Pt will be using her phone as the primary .  § Overview:  5min glucose reading updates, trending arrows, BG graph screens, battery life indicator, Blue Tooth Symbol.  § Menus: trend Graph, start sensor, enter BG, events, Alerts, Settings, Shutdown, Stop Sensor  §  Settings:                          * Urgent Low: 55 mg/dL                          * Urgent Low Soon: Off                          * Low alert: 70                          * High alert: 250                          * Rise rate: Off                          * Fall Rate: Off                          * Signal Loss: 30 min                          * No Reading: Off                          * " Always sound: On                             · Reviewed additional setting options with patient, including Graph Height and Transmitter ID. Transmitter was paired with .    · Reviewed where to find sensor insertion time and sensor expiration date.   · Discussed no need to calibrate sensor during the entirety of the 10 day wear. Discussed that pt can calibrate sensor if desired, but at that time she will need to continue calibrating every 12 hours for sensor to remain accurate. Reviewed appropriate calibration techniques.  · Reviewed sensor site selection. Site selected and prepped using aseptic technique Inserted to left abdomen. Transmitter placed in pod and secured.  · Practiced sensor pod/transmitter removal from site, and removal of transmitter from sensor pod.  · Patient able to demonstrate without difficulty.  Encouraged to review manual prior to starting another sensor.   · Reviewed problem solving aspects of sensor transmission/variables that can disrupt RF transmission.  range 20 feet, but the first 3 hrs keep within 3 feet of transmitter.  · Pt instructed on lag time of interstitial fluid from CBG and was advised to tx hypoglycemia and dose insulin based on SMBG values.  · Link to video provided and written instructions provided for patient to review before 10 day sensor change  · Dexcom technical support contact number given and examples of when to contact them discussed.       Follow Up Plan     Follow up in about 10 days (around 4/24/2022) for Personal CGM Upload.    Today's care plan and follow up schedule was discussed with patient.  Javier verbalized understanding of the care plan, goals, and agrees to follow up plan.        The patient was encouraged to communicate with his/her health care provider/physician and care team regarding his/her condition(s) and treatment.  I provided the patient with my contact information today and encouraged to contact me via phone or [facility]'s Patient  Portal as needed.     Length of Visit   Total Time: 90 Minutes

## 2022-05-13 ENCOUNTER — OFFICE VISIT (OUTPATIENT)
Dept: DIABETES | Facility: CLINIC | Age: 47
End: 2022-05-13
Payer: MEDICAID

## 2022-05-13 VITALS
HEART RATE: 83 BPM | OXYGEN SATURATION: 98 % | TEMPERATURE: 98 F | BODY MASS INDEX: 29.3 KG/M2 | DIASTOLIC BLOOD PRESSURE: 70 MMHG | WEIGHT: 216.31 LBS | HEIGHT: 72 IN | SYSTOLIC BLOOD PRESSURE: 115 MMHG

## 2022-05-13 DIAGNOSIS — I10 PRIMARY HYPERTENSION: Chronic | ICD-10-CM

## 2022-05-13 DIAGNOSIS — E66.3 OVERWEIGHT (BMI 25.0-29.9): ICD-10-CM

## 2022-05-13 DIAGNOSIS — Z79.4 TYPE 2 DIABETES MELLITUS WITH DIABETIC POLYNEUROPATHY, WITH LONG-TERM CURRENT USE OF INSULIN: ICD-10-CM

## 2022-05-13 DIAGNOSIS — E13.9 LADA (LATENT AUTOIMMUNE DIABETES MELLITUS IN ADULTS): Primary | ICD-10-CM

## 2022-05-13 DIAGNOSIS — E89.0 HISTORY OF THYROIDECTOMY, TOTAL: ICD-10-CM

## 2022-05-13 DIAGNOSIS — E11.65 UNCONTROLLED TYPE 2 DIABETES MELLITUS WITH HYPERGLYCEMIA: Chronic | ICD-10-CM

## 2022-05-13 DIAGNOSIS — E89.0 POSTOPERATIVE HYPOTHYROIDISM: ICD-10-CM

## 2022-05-13 DIAGNOSIS — E11.42 TYPE 2 DIABETES MELLITUS WITH DIABETIC POLYNEUROPATHY, WITH LONG-TERM CURRENT USE OF INSULIN: ICD-10-CM

## 2022-05-13 PROCEDURE — 99999 PR PBB SHADOW E&M-EST. PATIENT-LVL V: ICD-10-PCS | Mod: PBBFAC,,, | Performed by: NURSE PRACTITIONER

## 2022-05-13 PROCEDURE — 3074F SYST BP LT 130 MM HG: CPT | Mod: CPTII,,, | Performed by: NURSE PRACTITIONER

## 2022-05-13 PROCEDURE — 3078F DIAST BP <80 MM HG: CPT | Mod: CPTII,,, | Performed by: NURSE PRACTITIONER

## 2022-05-13 PROCEDURE — 3074F PR MOST RECENT SYSTOLIC BLOOD PRESSURE < 130 MM HG: ICD-10-PCS | Mod: CPTII,,, | Performed by: NURSE PRACTITIONER

## 2022-05-13 PROCEDURE — 3061F NEG MICROALBUMINURIA REV: CPT | Mod: CPTII,,, | Performed by: NURSE PRACTITIONER

## 2022-05-13 PROCEDURE — 4010F ACE/ARB THERAPY RXD/TAKEN: CPT | Mod: CPTII,,, | Performed by: NURSE PRACTITIONER

## 2022-05-13 PROCEDURE — 3078F PR MOST RECENT DIASTOLIC BLOOD PRESSURE < 80 MM HG: ICD-10-PCS | Mod: CPTII,,, | Performed by: NURSE PRACTITIONER

## 2022-05-13 PROCEDURE — 3066F PR DOCUMENTATION OF TREATMENT FOR NEPHROPATHY: ICD-10-PCS | Mod: CPTII,,, | Performed by: NURSE PRACTITIONER

## 2022-05-13 PROCEDURE — 1160F PR REVIEW ALL MEDS BY PRESCRIBER/CLIN PHARMACIST DOCUMENTED: ICD-10-PCS | Mod: CPTII,,, | Performed by: NURSE PRACTITIONER

## 2022-05-13 PROCEDURE — 99215 OFFICE O/P EST HI 40 MIN: CPT | Mod: PBBFAC,PN | Performed by: NURSE PRACTITIONER

## 2022-05-13 PROCEDURE — 95251 CONT GLUC MNTR ANALYSIS I&R: CPT | Mod: ,,, | Performed by: NURSE PRACTITIONER

## 2022-05-13 PROCEDURE — 3008F PR BODY MASS INDEX (BMI) DOCUMENTED: ICD-10-PCS | Mod: CPTII,,, | Performed by: NURSE PRACTITIONER

## 2022-05-13 PROCEDURE — 1159F PR MEDICATION LIST DOCUMENTED IN MEDICAL RECORD: ICD-10-PCS | Mod: CPTII,,, | Performed by: NURSE PRACTITIONER

## 2022-05-13 PROCEDURE — 3061F PR NEG MICROALBUMINURIA RESULT DOCUMENTED/REVIEW: ICD-10-PCS | Mod: CPTII,,, | Performed by: NURSE PRACTITIONER

## 2022-05-13 PROCEDURE — 99999 PR PBB SHADOW E&M-EST. PATIENT-LVL V: CPT | Mod: PBBFAC,,, | Performed by: NURSE PRACTITIONER

## 2022-05-13 PROCEDURE — 99214 OFFICE O/P EST MOD 30 MIN: CPT | Mod: S$PBB,,, | Performed by: NURSE PRACTITIONER

## 2022-05-13 PROCEDURE — 3066F NEPHROPATHY DOC TX: CPT | Mod: CPTII,,, | Performed by: NURSE PRACTITIONER

## 2022-05-13 PROCEDURE — 1159F MED LIST DOCD IN RCRD: CPT | Mod: CPTII,,, | Performed by: NURSE PRACTITIONER

## 2022-05-13 PROCEDURE — 99214 PR OFFICE/OUTPT VISIT, EST, LEVL IV, 30-39 MIN: ICD-10-PCS | Mod: S$PBB,,, | Performed by: NURSE PRACTITIONER

## 2022-05-13 PROCEDURE — 95251 PR GLUCOSE MONITOR, 72 HOUR, PHYS INTERP: ICD-10-PCS | Mod: ,,, | Performed by: NURSE PRACTITIONER

## 2022-05-13 PROCEDURE — 3008F BODY MASS INDEX DOCD: CPT | Mod: CPTII,,, | Performed by: NURSE PRACTITIONER

## 2022-05-13 PROCEDURE — 3044F HG A1C LEVEL LT 7.0%: CPT | Mod: CPTII,,, | Performed by: NURSE PRACTITIONER

## 2022-05-13 PROCEDURE — 4010F PR ACE/ARB THEARPY RXD/TAKEN: ICD-10-PCS | Mod: CPTII,,, | Performed by: NURSE PRACTITIONER

## 2022-05-13 PROCEDURE — 1160F RVW MEDS BY RX/DR IN RCRD: CPT | Mod: CPTII,,, | Performed by: NURSE PRACTITIONER

## 2022-05-13 PROCEDURE — 3044F PR MOST RECENT HEMOGLOBIN A1C LEVEL <7.0%: ICD-10-PCS | Mod: CPTII,,, | Performed by: NURSE PRACTITIONER

## 2022-05-13 RX ORDER — PEN NEEDLE, DIABETIC 32GX 5/32"
NEEDLE, DISPOSABLE MISCELLANEOUS
Qty: 150 EACH | Refills: 11 | Status: SHIPPED | OUTPATIENT
Start: 2022-05-13 | End: 2022-12-13 | Stop reason: SDUPTHER

## 2022-05-13 RX ORDER — INSULIN GLARGINE 100 [IU]/ML
15 INJECTION, SOLUTION SUBCUTANEOUS NIGHTLY
Qty: 15 ML | Refills: 6 | Status: SHIPPED | OUTPATIENT
Start: 2022-05-13 | End: 2022-10-05 | Stop reason: SDUPTHER

## 2022-05-13 RX ORDER — INSULIN ASPART 100 [IU]/ML
INJECTION, SOLUTION INTRAVENOUS; SUBCUTANEOUS
Qty: 15 ML | Refills: 6 | Status: SHIPPED | OUTPATIENT
Start: 2022-05-13 | End: 2022-10-05 | Stop reason: SDUPTHER

## 2022-05-13 NOTE — PATIENT INSTRUCTIONS
Continue Jesse tus 15 units nightly   Continue Novolog 5 units with meals        150-200: +1 unit  201-250: +2 units  251-300: +3 units  301-350: +4 units  >350: +5 units      discussed holding the NovoLog if her blood sugar is less than 100 or if she skips a meal

## 2022-05-13 NOTE — Clinical Note
Hey!  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

## 2022-05-13 NOTE — PROGRESS NOTES
CC:   Chief Complaint   Patient presents with    Diabetes Mellitus       HPI: Javier Enciso is a 47 y.o. female presents for a follow up visit today for the management of T2DM.     She was diagnosed with prediabetes in 2018 and was on Metformin, developed T2DM around 2020. She went to the ER in December 2021 and she was started on insulin therapy.     Family hx of diabetes: mother and father   Hospitalized for diabetes: denies   Insulin therapy: December 2021     No personal or FH of thyroid cancer or personal of pancreatic cancer or pancreatitis.     Hx of thyroidectomy due to cysts- January 2012-- not cancerous. LT4 150 mcg daily   Last TSH WNL   Has not seen general endocrine in a long time.     Our last visit was in February 2022  At that visit we discussed checking labs to rule out type 1 diabetes  TOM antibody came back positive at 0.04   Anti islet cell antibody was negative  C-peptide level came back high at 5.53--with blood sugar of 125    At our last visit we adjusted her Lantus to 16 units daily and adjusted the NovoLog to 5 units with breakfast, 6 units with lunch and dinner.  Discussed using the correction scale    We submitted for the Dexcom personal CGM    Her A1c has come down to 6.7%     See attached Dexcom download      DIABETES COMPLICATIONS: peripheral neuropathy      Diabetes Management Status    ASA: stopped ASA     Statin: Taking--Crestor 40 mg  ACE/ARB: Taking--losartan 50 mg      The 10-year ASCVD risk score ([place] GRAYSON Jr., et al., 2013) is: 5.2%    Values used to calculate the score:      Age: 47 years      Sex: Female      Is Non- : Yes      Diabetic: Yes      Tobacco smoker: No      Systolic Blood Pressure: 115 mmHg      Is BP treated: Yes      HDL Cholesterol: 44 mg/dL      Total Cholesterol: 208 mg/dL      Screening or Prevention Patient's value Goal Complete/Controlled?   HgA1C Testing and Control   Lab Results   Component Value Date    HGBA1C 6.7 (H)  04/14/2022      Annually/Less than 8% No   Lipid profile : 02/07/2022 Annually Yes   LDL control Lab Results   Component Value Date    LDLCALC 146.6 02/07/2022    Annually/Less than 100 mg/dl  No   Nephropathy screening Lab Results   Component Value Date    LABMICR <5.0 04/14/2022     No results found for: PROTEINUA Annually No   Blood pressure BP Readings from Last 1 Encounters:   05/13/22 115/70    Less than 140/90 Yes   Dilated retinal exam Most Recent Eye Exam Date: Not Found Annually No   Foot exam   : 02/21/2022 Annually No       CURRENT A1C:    Hemoglobin A1C   Date Value Ref Range Status   04/14/2022 6.7 (H) 4.0 - 5.6 % Final     Comment:     ADA Screening Guidelines:  5.7-6.4%  Consistent with prediabetes  >or=6.5%  Consistent with diabetes    High levels of fetal hemoglobin interfere with the HbA1C  assay. Heterozygous hemoglobin variants (HbS, HgC, etc)do  not significantly interfere with this assay.   However, presence of multiple variants may affect accuracy.     12/12/2021 11.1 (H) 4.7 - 5.6 % Final   08/04/2021 6.4 (H) 4.0 - 5.6 % Final     Comment:     ADA Screening Guidelines:  5.7-6.4%  Consistent with prediabetes  >or=6.5%  Consistent with diabetes    High levels of fetal hemoglobin interfere with the HbA1C  assay. Heterozygous hemoglobin variants (HbS, HgC, etc)do  not significantly interfere with this assay.   However, presence of multiple variants may affect accuracy.     03/03/2021 6.1 (H) 4.0 - 5.6 % Final     Comment:     ADA Screening Guidelines:  5.7-6.4%  Consistent with prediabetes  >or=6.5%  Consistent with diabetes    High levels of fetal hemoglobin interfere with the HbA1C  assay. Heterozygous hemoglobin variants (HbS, HgC, etc)do  not significantly interfere with this assay.   However, presence of multiple variants may affect accuracy.         GOAL A1C: 6.5% without hypoglycemia       DM MEDICATIONS USED IN THE PAST: Lantus, Novolog   Metformin -- wasn't working   dexcom        CURRENT DIABETES MEDICATIONS: Lantus 15 units nightly (10PM) and Novolog 5 units TID Ac  (usually only taking with breakfast and dinner) -- generally skipping the Novolog with lunch   Insulin:  pens.    Missed doses: missing doses of novolog         BLOOD GLUCOSE MONITORING:   Sensor type: Dexcom G6   Average BG readin  Time in range: 93%   Estimated A1c is 6.6%   7% high and 0% very high   0% low and 0% very low   Site change:  q10 days    Two weeks prior the average was 133 in target range 95% of the time with an estimated A1c of 6.5%      Supplies from [employer]       Sensor was downloaded in clinic today and reviewed with patient.   Please see attached document for download.       HYPOGLYCEMIA: 0% on dexcom download   Glucagon kit: [employer]    Medic alert bracelet: denies       MEALS: eating 3 meals per day   BF: cup of coffee, oatmeal with waffle or wheat bread with egg- or [employer]   Lunch: job pays for lunch--- fast food- or meat with veggie - or Burger and No fries   Dinner: veggie with meat -- rarely does rice -- spinach, [place] cut green beans or broccoli   Snack: rarely   Drinks: -- previously was drinking cranberry juice -- she was doing sugary beverage until recently   So she stopped the juices   Water makes her own tea with sugar substitute          CURRENT EXERCISE:  No      Review of Systems  Review of Systems   Constitutional: Negative for appetite change, fatigue and unexpected weight change.   HENT: Negative for trouble swallowing.    Eyes: Negative for visual disturbance.   Respiratory: Negative for shortness of breath.    Cardiovascular: Negative for chest pain.   Gastrointestinal: Negative for nausea.   Endocrine: Positive for polyphagia. Negative for polydipsia and polyuria.   Genitourinary:        No Nocturia    Skin: Negative for wound.   Neurological: Positive for numbness.       Physical Exam   Physical Exam  Vitals and nursing note reviewed.   Constitutional:       Appearance:  She is well-developed.      Comments: Overweight female   HENT:      Head: Normocephalic and atraumatic.      Right Ear: External ear normal.      Left Ear: External ear normal.      Nose: Nose normal.   Neck:      Thyroid: No thyromegaly.      Trachea: No tracheal deviation.   Cardiovascular:      Rate and Rhythm: Normal rate and regular rhythm.      Heart sounds: No murmur heard.  Pulmonary:      Effort: Pulmonary effort is normal. No respiratory distress.      Breath sounds: Normal breath sounds.   Abdominal:      Palpations: Abdomen is soft.      Tenderness: There is no abdominal tenderness.      Hernia: No hernia is present.   Musculoskeletal:      Cervical back: Normal range of motion and neck supple.   Skin:     General: Skin is warm and dry.      Capillary Refill: Capillary refill takes less than 2 seconds.      Findings: No rash.      Comments: Injection sites and dexcom sites are normal appearing. No lipo hypertropthy or atrophy     Neurological:      Mental Status: She is alert and oriented to person, place, and time.      Cranial Nerves: No cranial nerve deficit.   Psychiatric:         Behavior: Behavior normal.         Judgment: Judgment normal.         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 She would like to continue with current treatment   Printed material provided to patient and she will think about it  Dexcom is helping with self awareness and self accountability    Uncontrolled    reportedly blood sugar readings are above goal   will get labs prior to RTC to rule out type 1 diabetes-- we discussed then we could explore other treatment options such as GLP1 and SGLT2         -- Medication Changes:   Continue Lantus 15 units nightly    Continue NovoLog 5 units t.i.d. a.c. + correction scale goal 150, +1     Hold NovoLog if blood sugars under 100 prior to meal or if skipping a meal    Start using correction scale.       150-200: +1 unit  201-250: +2 units  251-300: +3 units  301-350: +4 units  >350: +5 units     discussed holding the NovoLog if her blood sugar is less than 100 or if she skips a meal    Continue to use the dexcom       -- Reviewed goals of therapy are to get the best control we can without hypoglycemia.  -- Reviewed patient's current insulin regimen. Clarified proper insulin dose and timing in relation to meals, etc. Insulin injection sites and proper rotation instructed.    -- Advised frequent self blood glucose monitoring.  Patient encouraged to document glucose results and bring them to every clinic visit.  Continue to use the dexcom personal CGM   -- Hypoglycemia precautions discussed. Instructed on precautions before driving.    -- Call for Bg repeatedly < 70 or > 180.   -- Close adherence to lifestyle changes recommended.   -- Periodic follow ups for eye evaluations, foot care and dental care suggested.        Patient has diabetes mellitus and manages diabetes with intensive insulin regimen and uses prandial and basal insulin daily  Patient requires a therapeutic CGM and is willing to use therapeutic CGM for the necessary frequent adjustments of insulin therapy.  I have completed an in-person visit during the previous 6 months and will continue to have in-person visits every 6 months to assess adherence to their CGM regimen and diabetes treatment plan.  Due to COVID pandemic and need for remote monitoring this tool is medically necessary            Type 2 diabetes mellitus with diabetic polyneuropathy, with long-term current use of insulin  Optimize BG readings.   See above.    she is following with an external podiatrist    Educated patient to check feet daily for any foreign objects and/or wounds. Discussed with patient  the importance of wearing appropriate footwear at all times, not to walk barefoot ever, and to check shoes before putting them on feet. Instructed patient to keep feet dry by regularly changing shoes and socks and drying feet after baths and exercises. Also, instructed patient to report any new lesions, discolorations, or swelling to a healthcare professional.        Primary hypertension  BP goal is < 140/90.   Tolerating ARB  Controlled   Blood pressure goals discussed with patient      Overweight (BMI 25.0-29.9)  Body mass index is 29.34 kg/m².  Increases insulin resistance.   Discussed DM diet and exercise.       Postoperative hypothyroidism  TSH WNL, on LT4 150 mcg, reinforced take by itself on empty stomach, first thing in the morning and wait at least 30-60 minutes to eat, drink, or take other medications.          History of thyroidectomy, total  On LT4   Reports cysts that were removed were  Benign  Follow up with general endocrine            Follow up in about 6 months (around 11/13/2022).  Schedule general endocrine in [place] in 3 months for thyroid follow up and diabetes   Follow up with me in 6 months         No orders of the defined types were placed in this encounter.      Recommendations were discussed with the patient in detail  The patient verbalized understanding and agrees with the plan outlined as above.     This note was partly generated with [employer] voice recognition software. I apologize for any possible typographical errors.

## 2022-05-13 NOTE — ASSESSMENT & PLAN NOTE
Body mass index is 29.34 kg/m².  Increases insulin resistance.   Discussed DM diet and exercise.

## 2022-05-13 NOTE — ASSESSMENT & PLAN NOTE
+ TOM   c-peptide level WNL   Can treat as T2DM   Discussed trying to titrate back/ come off insulin and use GLP1 or SGLT2   She deferred. She would like to continue with current treatment   Printed material provided to patient and she will think about it  Dexcom is helping with self awareness and self accountability    Uncontrolled    reportedly blood sugar readings are above goal   will get labs prior to RTC to rule out type 1 diabetes-- we discussed then we could explore other treatment options such as GLP1 and SGLT2         -- Medication Changes:   Continue Lantus 15 units nightly   Continue NovoLog 5 units t.i.d. a.c. + correction scale goal 150, +1     Hold NovoLog if blood sugars under 100 prior to meal or if skipping a meal    Start using correction scale.       150-200: +1 unit  201-250: +2 units  251-300: +3 units  301-350: +4 units  >350: +5 units     discussed holding the NovoLog if her blood sugar is less than 100 or if she skips a meal    Continue to use the dexcom       -- Reviewed goals of therapy are to get the best control we can without hypoglycemia.  -- Reviewed patient's current insulin regimen. Clarified proper insulin dose and timing in relation to meals, etc. Insulin injection sites and proper rotation instructed.    -- Advised frequent self blood glucose monitoring.  Patient encouraged to document glucose results and bring them to every clinic visit.  Continue to use the dexcom personal CGM   -- Hypoglycemia precautions discussed. Instructed on precautions before driving.    -- Call for Bg repeatedly < 70 or > 180.   -- Close adherence to lifestyle changes recommended.   -- Periodic follow ups for eye evaluations, foot care and dental care suggested.        Patient has diabetes mellitus and manages diabetes with intensive insulin regimen and uses prandial and basal insulin daily  Patient requires a therapeutic CGM and is willing to use therapeutic CGM for the necessary frequent adjustments  of insulin therapy.  I have completed an in-person visit during the previous 6 months and will continue to have in-person visits every 6 months to assess adherence to their CGM regimen and diabetes treatment plan.  Due to COVID pandemic and need for remote monitoring this tool is medically necessary

## 2022-06-06 PROBLEM — J32.9 CHRONIC SINUSITIS: Chronic | Status: RESOLVED | Noted: 2021-12-16 | Resolved: 2022-06-06

## 2022-07-12 PROBLEM — M25.9 KNEE JOINT DISORDER: Chronic | Status: ACTIVE | Noted: 2022-07-12

## 2022-07-22 ENCOUNTER — TELEPHONE (OUTPATIENT)
Dept: GASTROENTEROLOGY | Facility: CLINIC | Age: 47
End: 2022-07-22
Payer: MEDICAID

## 2022-07-22 ENCOUNTER — PATIENT MESSAGE (OUTPATIENT)
Dept: GASTROENTEROLOGY | Facility: CLINIC | Age: 47
End: 2022-07-22
Payer: MEDICAID

## 2022-07-22 NOTE — TELEPHONE ENCOUNTER
[facility] for the patient to call me at [phone]. I gave her an appt with Anette Mcbride NP on 9/16/2022 at 10:30 am. I also sent a my chart message to her.      ----- Message from Angie Reyes-Ruiz, MA sent at 7/21/2022  4:28 PM CDT -----    ----- Message -----  From: Nahomy Caro  Sent: 7/21/2022   4:26 PM CDT  To: [facility] Clinical Support        Patient has referral on file to be seen in gastro for GERD  No dates to schedule    Patient can be contacted @# [phone]

## 2022-08-04 ENCOUNTER — TELEPHONE (OUTPATIENT)
Dept: GASTROENTEROLOGY | Facility: CLINIC | Age: 47
End: 2022-08-04
Payer: MEDICAID

## 2022-08-04 NOTE — TELEPHONE ENCOUNTER
Patient agreed to keep her appts as is because these two providers are not here on the same day.      ----- Message from Angie Reyes-Ruiz, MA sent at 8/4/2022  9:58 AM CDT -----  Contact: pt    ----- Message -----  From: Sophy Dhillon  Sent: 8/4/2022   9:53 AM CDT  To: [facility] Staff    Pt requesting call back re: r/s her appt on 9-16 to be on 9-27 since she is already coming in that day. Pt states she can not take off of work that much and is trying to get all her appts on one day.     Confirmed contact below:  Contact Name:Javier Enciso  Phone Number: [phone]

## 2022-08-05 ENCOUNTER — TELEPHONE (OUTPATIENT)
Dept: ENDOCRINOLOGY | Facility: CLINIC | Age: 47
End: 2022-08-05
Payer: MEDICAID

## 2022-08-05 NOTE — TELEPHONE ENCOUNTER
----- Message from [facility] sent at 8/5/2022  3:08 PM CDT -----  Type:  Needs Medical Advice    Who Called: PT   Would the patient rather a call back or a response via [employer]? Callback   Best Call Back Number: [phone]  Additional Information: Pt requesting callback from office to reschedule appt.

## 2022-08-16 ENCOUNTER — OFFICE VISIT (OUTPATIENT)
Dept: ENDOCRINOLOGY | Facility: CLINIC | Age: 47
End: 2022-08-16
Payer: MEDICAID

## 2022-08-16 VITALS
HEART RATE: 79 BPM | SYSTOLIC BLOOD PRESSURE: 126 MMHG | TEMPERATURE: 98 F | DIASTOLIC BLOOD PRESSURE: 72 MMHG | WEIGHT: 218.25 LBS | OXYGEN SATURATION: 97 % | BODY MASS INDEX: 29.56 KG/M2 | HEIGHT: 72 IN

## 2022-08-16 DIAGNOSIS — E03.9 HYPOTHYROIDISM, UNSPECIFIED TYPE: ICD-10-CM

## 2022-08-16 DIAGNOSIS — E11.65 TYPE 2 DIABETES MELLITUS WITH HYPERGLYCEMIA, WITH LONG-TERM CURRENT USE OF INSULIN: Primary | ICD-10-CM

## 2022-08-16 DIAGNOSIS — Z79.4 TYPE 2 DIABETES MELLITUS WITH HYPERGLYCEMIA, WITH LONG-TERM CURRENT USE OF INSULIN: Primary | ICD-10-CM

## 2022-08-16 DIAGNOSIS — E89.0 POSTOPERATIVE HYPOTHYROIDISM: ICD-10-CM

## 2022-08-16 PROCEDURE — 1160F PR REVIEW ALL MEDS BY PRESCRIBER/CLIN PHARMACIST DOCUMENTED: ICD-10-PCS | Mod: CPTII,,, | Performed by: INTERNAL MEDICINE

## 2022-08-16 PROCEDURE — 1160F RVW MEDS BY RX/DR IN RCRD: CPT | Mod: CPTII,,, | Performed by: INTERNAL MEDICINE

## 2022-08-16 PROCEDURE — 1159F MED LIST DOCD IN RCRD: CPT | Mod: CPTII,,, | Performed by: INTERNAL MEDICINE

## 2022-08-16 PROCEDURE — 3074F PR MOST RECENT SYSTOLIC BLOOD PRESSURE < 130 MM HG: ICD-10-PCS | Mod: CPTII,,, | Performed by: INTERNAL MEDICINE

## 2022-08-16 PROCEDURE — 1159F PR MEDICATION LIST DOCUMENTED IN MEDICAL RECORD: ICD-10-PCS | Mod: CPTII,,, | Performed by: INTERNAL MEDICINE

## 2022-08-16 PROCEDURE — 3008F BODY MASS INDEX DOCD: CPT | Mod: CPTII,,, | Performed by: INTERNAL MEDICINE

## 2022-08-16 PROCEDURE — 3008F PR BODY MASS INDEX (BMI) DOCUMENTED: ICD-10-PCS | Mod: CPTII,,, | Performed by: INTERNAL MEDICINE

## 2022-08-16 PROCEDURE — 3078F PR MOST RECENT DIASTOLIC BLOOD PRESSURE < 80 MM HG: ICD-10-PCS | Mod: CPTII,,, | Performed by: INTERNAL MEDICINE

## 2022-08-16 PROCEDURE — 3061F NEG MICROALBUMINURIA REV: CPT | Mod: CPTII,,, | Performed by: INTERNAL MEDICINE

## 2022-08-16 PROCEDURE — 3074F SYST BP LT 130 MM HG: CPT | Mod: CPTII,,, | Performed by: INTERNAL MEDICINE

## 2022-08-16 PROCEDURE — 99214 OFFICE O/P EST MOD 30 MIN: CPT | Mod: S$PBB,,, | Performed by: INTERNAL MEDICINE

## 2022-08-16 PROCEDURE — 3044F PR MOST RECENT HEMOGLOBIN A1C LEVEL <7.0%: ICD-10-PCS | Mod: CPTII,,, | Performed by: INTERNAL MEDICINE

## 2022-08-16 PROCEDURE — 99214 PR OFFICE/OUTPT VISIT, EST, LEVL IV, 30-39 MIN: ICD-10-PCS | Mod: S$PBB,,, | Performed by: INTERNAL MEDICINE

## 2022-08-16 PROCEDURE — 3044F HG A1C LEVEL LT 7.0%: CPT | Mod: CPTII,,, | Performed by: INTERNAL MEDICINE

## 2022-08-16 PROCEDURE — 99215 OFFICE O/P EST HI 40 MIN: CPT | Mod: PBBFAC,PO | Performed by: INTERNAL MEDICINE

## 2022-08-16 PROCEDURE — 3066F NEPHROPATHY DOC TX: CPT | Mod: CPTII,,, | Performed by: INTERNAL MEDICINE

## 2022-08-16 PROCEDURE — 99999 PR PBB SHADOW E&M-EST. PATIENT-LVL V: CPT | Mod: PBBFAC,,, | Performed by: INTERNAL MEDICINE

## 2022-08-16 PROCEDURE — 3066F PR DOCUMENTATION OF TREATMENT FOR NEPHROPATHY: ICD-10-PCS | Mod: CPTII,,, | Performed by: INTERNAL MEDICINE

## 2022-08-16 PROCEDURE — 4010F ACE/ARB THERAPY RXD/TAKEN: CPT | Mod: CPTII,,, | Performed by: INTERNAL MEDICINE

## 2022-08-16 PROCEDURE — 4010F PR ACE/ARB THEARPY RXD/TAKEN: ICD-10-PCS | Mod: CPTII,,, | Performed by: INTERNAL MEDICINE

## 2022-08-16 PROCEDURE — 3061F PR NEG MICROALBUMINURIA RESULT DOCUMENTED/REVIEW: ICD-10-PCS | Mod: CPTII,,, | Performed by: INTERNAL MEDICINE

## 2022-08-16 PROCEDURE — 3078F DIAST BP <80 MM HG: CPT | Mod: CPTII,,, | Performed by: INTERNAL MEDICINE

## 2022-08-16 PROCEDURE — 99999 PR PBB SHADOW E&M-EST. PATIENT-LVL V: ICD-10-PCS | Mod: PBBFAC,,, | Performed by: INTERNAL MEDICINE

## 2022-08-16 NOTE — ASSESSMENT & PLAN NOTE
Reviewed goals of therapy - to get the best control we can without hypoglycemia. Goal a1c <7   last a1c at goal   not having hypoglycemia   pt reports glucose log usually good   higher last week or two    Medication changes:    continue same for now.   if sugar stays high, start to titrate up long acting insulin first.   can use 16 units lantus qHS   continue 5 units novolog + correction scale    Reviewed patient's current insulin regimen. Clarified proper insulin dose and timing in relation to meals, etc. Insulin injection sites and proper rotation instructed.     -Advised frequent self blood glucose monitoring. Patient encouraged to document glucose results and bring them to every clinic visit. On 4 shots/day, using glucose data to adjust insulin doses, benefits from CGM  -Hypoglycemia precautions discussed. Instructed on precautions before driving.    -Close adherence to lifestyle changes recommended.    -Periodic follow ups for eye evaluations, foot care suggested.    Discussed potential for pump. Pt not sure. With last a1c at goal already, reasonable to continue what she is doing. Discussed if she is interested, look into Omnipod 5 or Tslim X2 with control IQ. Both can communicate with dexcom.      Had elevated TOM but normal/high c-peptide.   repeat diabetes antibodies and Cpeptide next time.   if Type 2 or JIMENA with okay Cpeptide, consider stopping novolog and using GLP1 agonist instead.   if type 1, likely stick with insulin, re-evaluate potential for pumps

## 2022-08-16 NOTE — PROGRESS NOTES
Subjective:      Chief Complaint: Diabetes    HPI: Javier Enciso is a 47 y.o. female who is here for an initial evaluation for diabetes.    Has post-surgical hypothyroidism   Surgery 12/2012. Pathology benign    Lab Results   Component Value Date    TSH 0.768 02/07/2022    FREET4 1.59 (H) 12/17/2021     Levothyroxine 150 mcg/day    With regards to the diabetes:  Diagnosed with prediabetes in 2018.   type 2 DM in 2020.   insulin since 12/2021    TOM + at 0.04. islet cell antibody negative.   C-peptide 5.5 with glucose 125    Known complications:     Retinopathy? Yes, maybe, mild per patient.    Nephropathy? No    Neuropathy? Yes    CAD? No    CVA? No    Current regimen:   novolog 5 units TIDWM   lantus 15 units qHS     Missed doses? denies    Prior treatments:     Metformin    other insulins     # times a day testing: Dexcom CGM  Log reviewed: No    Last week or two high, 200-250s.    This week 100s mostly.    -140s.    Hypoglycemic events? Not lately    Current Symptoms  No   Yes  [x]    []  Polydipsia  []    [x]  Polyuria  []    [x]  Vision changes  []    [x]  Nausea  [x]    []  Diarrhea  [x]    []  Weight gain  [x]    []  Weight loss    Knee pain, left mostly   falls. Balance problems.    neuropathy    Diabetes Management Status    Statin: Taking  ACE/ARB: Taking    Screening or Prevention Patient's value Goal Complete/Controlled?   HgA1C Testing and Control   Lab Results   Component Value Date    HGBA1C 6.4 (H) 07/08/2022      q6months/Less than 7% Yes   Lipid profile : 07/08/2022 Annually Yes   LDL control Lab Results   Component Value Date    LDLCALC 95.2 07/08/2022    Annually/Less than 100 mg/dl  Yes   Nephropathy screening Lab Results   Component Value Date    MICALBCREAT Unable to calculate 04/14/2022     Lab Results   Component Value Date    CREATININE 0.9 07/08/2022     Lab Results   Component Value Date    PROTEINUA Negative 07/08/2022    Annually Yes   Blood pressure BP Readings from Last 1  Encounters:   08/16/22 126/72    Less than 140/90 Yes   Dilated retinal exam Most Recent Eye Exam Date: Not Found Annually No   Foot exam   : 02/21/2022 Annually Yes       Reviewed past medical, family, social history and updated as appropriate.    Review of Systems  As above    Objective:     Vitals:    08/16/22 1316   BP: 126/72   Pulse: 79   Temp: 98 °F (36.7 °C)     BP Readings from Last 5 Encounters:   08/16/22 126/72   07/12/22 125/84   06/06/22 116/88   05/13/22 115/70   03/07/22 122/86     Physical Exam  Vitals reviewed.   Constitutional:       General: She is not in acute distress.  Neck:      Thyroid: No thyromegaly.   Cardiovascular:      Heart sounds: Normal heart sounds.   Pulmonary:      Effort: Pulmonary effort is normal.          Wt Readings from Last 5 Encounters:   08/16/22 1316 99 kg (218 lb 4.1 oz)   07/12/22 1016 98.4 kg (217 lb)   06/06/22 1111 99.3 kg (219 lb)   05/13/22 1009 98.1 kg (216 lb 4.8 oz)   03/07/22 1050 99.3 kg (219 lb)     Lab Results   Component Value Date    HGBA1C 6.4 (H) 07/08/2022    HGBA1C 6.7 (H) 04/14/2022    HGBA1C 11.1 (H) 12/12/2021    HGBA1C 6.4 (H) 08/04/2021     Lab Results   Component Value Date    CHOL 144 07/08/2022    CHOL 208 (H) 02/07/2022    HDL 34 (L) 07/08/2022    HDL 44 02/07/2022    LDLCALC 95.2 07/08/2022    LDLCALC 146.6 02/07/2022    TRIG 74 07/08/2022    TRIG 87 02/07/2022    CHOLHDL 23.6 07/08/2022    CHOLHDL 21.2 02/07/2022     Lab Results   Component Value Date     07/08/2022    K 4.0 07/08/2022     07/08/2022    CO2 22 (L) 07/08/2022    GLU 91 07/08/2022    BUN 11 07/08/2022    CREATININE 0.9 07/08/2022    CALCIUM 8.9 07/08/2022    PROT 6.9 07/08/2022    ALBUMIN 3.8 07/08/2022    BILITOT 0.8 07/08/2022    ALKPHOS 63 07/08/2022    AST 15 07/08/2022    ALT 19 07/08/2022    ANIONGAP 9 07/08/2022    ESTGFRAFRICA >60.0 07/08/2022    EGFRNONAA >60.0 07/08/2022    TSH 0.768 02/07/2022      Lab Results   Component Value Date    MICALBCREAT  Unable to calculate 04/14/2022       Assessment/Plan:     Type 2 diabetes mellitus with hyperglycemia, with long-term current use of insulin  Reviewed goals of therapy - to get the best control we can without hypoglycemia. Goal a1c <7   last a1c at goal   not having hypoglycemia   pt reports glucose log usually good   higher last week or two    Medication changes:    continue same for now.   if sugar stays high, start to titrate up long acting insulin first.   can use 16 units lantus qHS   continue 5 units novolog + correction scale    Reviewed patient's current insulin regimen. Clarified proper insulin dose and timing in relation to meals, etc. Insulin injection sites and proper rotation instructed.     -Advised frequent self blood glucose monitoring. Patient encouraged to document glucose results and bring them to every clinic visit. On 4 shots/day, using glucose data to adjust insulin doses, benefits from CGM  -Hypoglycemia precautions discussed. Instructed on precautions before driving.    -Close adherence to lifestyle changes recommended.    -Periodic follow ups for eye evaluations, foot care suggested.    Discussed potential for pump. Pt not sure. With last a1c at goal already, reasonable to continue what she is doing. Discussed if she is interested, look into Omnipod 5 or Tslim X2 with control IQ. Both can communicate with dexcom.      Had elevated TOM but normal/high c-peptide.   repeat diabetes antibodies and Cpeptide next time.   if Type 2 or JIMENA with okay Cpeptide, consider stopping novolog and using GLP1 agonist instead.   if type 1, likely stick with insulin, re-evaluate potential for pumps      Postoperative hypothyroidism  Lab Results   Component Value Date    TSH 0.768 02/07/2022     Normal labs   continue same dose   monitor 1-2 times a year. Or sooner if symptoms change        Follow up in about 4 months (around 12/16/2022) for lab review, further monitoring.      David Goyal MD  Endocrinology

## 2022-08-16 NOTE — ASSESSMENT & PLAN NOTE
Lab Results   Component Value Date    TSH 0.768 02/07/2022     Normal labs   continue same dose   monitor 1-2 times a year. Or sooner if symptoms change

## 2022-08-31 ENCOUNTER — TELEPHONE (OUTPATIENT)
Dept: ORTHOPEDICS | Facility: CLINIC | Age: 47
End: 2022-08-31
Payer: MEDICAID

## 2022-09-08 ENCOUNTER — TELEPHONE (OUTPATIENT)
Dept: CARDIOLOGY | Facility: CLINIC | Age: 47
End: 2022-09-08
Payer: MEDICAID

## 2022-09-08 NOTE — TELEPHONE ENCOUNTER
"I called the patient and she stated that she was already called and put on a wait list.    ----- Message from Angie Reyes-Ruiz, MA sent at 9/8/2022  4:16 PM CDT -----    ----- Message -----  From: Abby Gates  Sent: 9/8/2022   3:20 PM CDT  To: [facility] Staff    Consult/Advisory:           Name Of Caller: self    Contact Preference?: [phone]    What is the nature of the call?: inquiring about an afternoon appt on 9/16           Additional Notes:  "Thank you for all that you do for our patients'"       "

## 2022-09-16 ENCOUNTER — OFFICE VISIT (OUTPATIENT)
Dept: GASTROENTEROLOGY | Facility: CLINIC | Age: 47
End: 2022-09-16
Payer: MEDICAID

## 2022-09-16 VITALS
DIASTOLIC BLOOD PRESSURE: 65 MMHG | WEIGHT: 220.38 LBS | HEIGHT: 71 IN | BODY MASS INDEX: 30.85 KG/M2 | HEART RATE: 79 BPM | SYSTOLIC BLOOD PRESSURE: 123 MMHG | OXYGEN SATURATION: 97 %

## 2022-09-16 DIAGNOSIS — K21.9 GASTROESOPHAGEAL REFLUX DISEASE WITHOUT ESOPHAGITIS: Chronic | ICD-10-CM

## 2022-09-16 PROCEDURE — 4010F ACE/ARB THERAPY RXD/TAKEN: CPT | Mod: CPTII,,, | Performed by: NURSE PRACTITIONER

## 2022-09-16 PROCEDURE — 3074F SYST BP LT 130 MM HG: CPT | Mod: CPTII,,, | Performed by: NURSE PRACTITIONER

## 2022-09-16 PROCEDURE — 3008F PR BODY MASS INDEX (BMI) DOCUMENTED: ICD-10-PCS | Mod: CPTII,,, | Performed by: NURSE PRACTITIONER

## 2022-09-16 PROCEDURE — 99999 PR PBB SHADOW E&M-EST. PATIENT-LVL V: CPT | Mod: PBBFAC,,, | Performed by: NURSE PRACTITIONER

## 2022-09-16 PROCEDURE — 99999 PR PBB SHADOW E&M-EST. PATIENT-LVL V: ICD-10-PCS | Mod: PBBFAC,,, | Performed by: NURSE PRACTITIONER

## 2022-09-16 PROCEDURE — 3008F BODY MASS INDEX DOCD: CPT | Mod: CPTII,,, | Performed by: NURSE PRACTITIONER

## 2022-09-16 PROCEDURE — 99204 OFFICE O/P NEW MOD 45 MIN: CPT | Mod: S$PBB,,, | Performed by: NURSE PRACTITIONER

## 2022-09-16 PROCEDURE — 99215 OFFICE O/P EST HI 40 MIN: CPT | Mod: PBBFAC,PN | Performed by: NURSE PRACTITIONER

## 2022-09-16 PROCEDURE — 3044F PR MOST RECENT HEMOGLOBIN A1C LEVEL <7.0%: ICD-10-PCS | Mod: CPTII,,, | Performed by: NURSE PRACTITIONER

## 2022-09-16 PROCEDURE — 3044F HG A1C LEVEL LT 7.0%: CPT | Mod: CPTII,,, | Performed by: NURSE PRACTITIONER

## 2022-09-16 PROCEDURE — 3078F PR MOST RECENT DIASTOLIC BLOOD PRESSURE < 80 MM HG: ICD-10-PCS | Mod: CPTII,,, | Performed by: NURSE PRACTITIONER

## 2022-09-16 PROCEDURE — 4010F PR ACE/ARB THEARPY RXD/TAKEN: ICD-10-PCS | Mod: CPTII,,, | Performed by: NURSE PRACTITIONER

## 2022-09-16 PROCEDURE — 3061F NEG MICROALBUMINURIA REV: CPT | Mod: CPTII,,, | Performed by: NURSE PRACTITIONER

## 2022-09-16 PROCEDURE — 3066F NEPHROPATHY DOC TX: CPT | Mod: CPTII,,, | Performed by: NURSE PRACTITIONER

## 2022-09-16 PROCEDURE — 1159F PR MEDICATION LIST DOCUMENTED IN MEDICAL RECORD: ICD-10-PCS | Mod: CPTII,,, | Performed by: NURSE PRACTITIONER

## 2022-09-16 PROCEDURE — 3061F PR NEG MICROALBUMINURIA RESULT DOCUMENTED/REVIEW: ICD-10-PCS | Mod: CPTII,,, | Performed by: NURSE PRACTITIONER

## 2022-09-16 PROCEDURE — 3066F PR DOCUMENTATION OF TREATMENT FOR NEPHROPATHY: ICD-10-PCS | Mod: CPTII,,, | Performed by: NURSE PRACTITIONER

## 2022-09-16 PROCEDURE — 99204 PR OFFICE/OUTPT VISIT, NEW, LEVL IV, 45-59 MIN: ICD-10-PCS | Mod: S$PBB,,, | Performed by: NURSE PRACTITIONER

## 2022-09-16 PROCEDURE — 3074F PR MOST RECENT SYSTOLIC BLOOD PRESSURE < 130 MM HG: ICD-10-PCS | Mod: CPTII,,, | Performed by: NURSE PRACTITIONER

## 2022-09-16 PROCEDURE — 3078F DIAST BP <80 MM HG: CPT | Mod: CPTII,,, | Performed by: NURSE PRACTITIONER

## 2022-09-16 PROCEDURE — 1159F MED LIST DOCD IN RCRD: CPT | Mod: CPTII,,, | Performed by: NURSE PRACTITIONER

## 2022-09-16 RX ORDER — ESOMEPRAZOLE MAGNESIUM 40 MG/1
40 CAPSULE, DELAYED RELEASE ORAL
Qty: 30 CAPSULE | Refills: 3 | Status: SHIPPED | OUTPATIENT
Start: 2022-09-16 | End: 2022-11-03 | Stop reason: SDUPTHER

## 2022-09-16 RX ORDER — SUCRALFATE 1 G/1
1 TABLET ORAL 3 TIMES DAILY
Qty: 90 TABLET | Refills: 3 | Status: SHIPPED | OUTPATIENT
Start: 2022-09-16 | End: 2022-10-05 | Stop reason: SDUPTHER

## 2022-09-16 NOTE — PROGRESS NOTES
GASTROENTEROLOGY CLINIC NOTE    Chief Complaint: The encounter diagnosis was Gastroesophageal reflux disease without esophagitis.  Referring provider/PCP: Tootie Estrella NP    HPI:  Javier Enciso is a 47 y.o. female who is a new patient to me with a PMH that is significant for GERD, iron-deficiency anemia, H pylori, and diabetes mellitus type 2.  She is here today to establish care for reflux. She has a long history of reflux and reports it has been ongoing for several years.  She was previously seen by GI at [facility] and followed for reflux and iron deficiency anemia.  According to documentation in Care everywhere patient has history of H pylori.  She underwent EGD in 2015 which revealed Candida.  It is unclear whether not she completed treatment.  Colonoscopy was also ordered but I do not see a report for the colonoscopy. symptoms are occurring every day and are worse after eating.  It is accompanied by frequent belching, regurgitation, pyrosis, and occasional dysphagia.  She also endorses nocturnal symptoms and states at night when she is sleeping she will feel discomfort/food in her chest and will make it up in order to help alleviate symptoms.      Treatments Tried:  Nexium 20 mg daily (currently taking) , famotidine, omeprazole  NSAIDs: No  Anticoagulation or Antiplatelet: No    Prior Upper Endoscopy: 9/2015  Esophagus:   -white patchy exudate in mid and distal esophagus which was   easily removed, s/p brushing   -normal Z-line on entry, subsequent retching during procedure   with small 2mm hematoma (not actively bleeding) noted at Z-line   on withdrawal of scope     Stomach:   -large 5cm hiatal hernia (located at 39-44 cm from entry)   -patchy erythema in antrum c/w mild gastritis, s/p cold forceps   biopsies (antrum, body, incisura) (Jar 3) to evaluate for H   pylori   -patent, normal pylorus     Duodenum:   -prominent ampulla - otherwise normal mucosa of bulb and 2nd   part, cold forceps biopsies of bulb  x2 (Jar 2) and 2nd part x4   (Jar 1) to evaluate for celiac disease      Pathology:  Pathology/Cytology Reviewed   FINAL DIAGNOSIS   GROSS & MICROSCOPIC:   1. Duodenum, 2nd portion, biopsy: Duodenal mucosa with no   Significant microscopic abnormality.     2. Duodenal bulb, biopsy: Duodenal mucosa with minimal chronic inflammation, Brunner gland hyperplasia, and focal gastric metaplasia, suggestive of peptic duodenitis.     3. Stomach, biopsy: Non-oxyntic and oxyntic gastric mucosa with mild chronic gastritis. There is no evidence of intestinal metaplasia, dysplasia or malignancy. Special stain, Giemsa, for Helicobacter-like organisms is negative, adequately controlled.     Esophagus, brushing: Negative. No evidence of malignancy.   Abundant fungal elements consistent with Candida are identified.     Prior Colonoscopy:    Family h/o Colon Cancer: No  Family h/o Crohn's Disease or Ulcerative Colitis: No  Family h/o Celiac Sprue: No  Abdominal Surgeries:  No    Review of Systems   Constitutional:  Negative for weight loss.   HENT:  Negative for sore throat.    Eyes:  Negative for blurred vision.   Respiratory:  Negative for cough.    Cardiovascular:  Negative for chest pain.   Gastrointestinal:  Positive for heartburn and nausea. Negative for abdominal pain, blood in stool, constipation, diarrhea, melena and vomiting.   Genitourinary:  Negative for dysuria.   Musculoskeletal:  Negative for myalgias.   Skin:  Negative for rash.   Neurological:  Negative for headaches.   Endo/Heme/Allergies:  Negative for environmental allergies.   Psychiatric/Behavioral:  Negative for suicidal ideas. The patient is not nervous/anxious.      Past Medical History: has a past medical history of Diabetes mellitus, type 2.    Past Surgical History: has a past surgical history that includes Breast cyst aspiration (Left, 1996) and Breast cyst aspiration (Right, 2001).    Family History:family history includes Breast cancer in her maternal  "aunt; Diabetes in her father and mother.    Allergies:   Review of patient's allergies indicates:   Allergen Reactions    Hydrocodone Itching    Hydrocodone-acetaminophen      Itchy (skin)^       Social History: reports that she has never smoked. She has never used smokeless tobacco. She reports current alcohol use. She reports that she does not use drugs.    Home medications:   Current Outpatient Medications on File Prior to Visit   Medication Sig Dispense Refill    ALPRAZolam (XANAX) 0.25 MG tablet Take 1 tablet (0.25 mg total) by mouth nightly as needed for Anxiety. 30 tablet 3    amLODIPine (NORVASC) 10 MG tablet Take 1 tablet (10 mg total) by mouth once daily. 30 tablet 3    BD VARUN 2ND GEN PEN NEEDLE 32 gauge x 5/32" Ndle To use with insulin injections 5 times per day 150 each 11    butalbital-acetaminophen-caffeine -40 mg (FIORICET, ESGIC) -40 mg per tablet Take 1 tablet by mouth every 8 (eight) hours as needed for Headaches (every 8 hours prn). 30 tablet 3    cetirizine (ZYRTEC) 10 MG tablet Take 1 tablet (10 mg total) by mouth nightly. 30 tablet 3    diclofenac sodium (SOLARAZE) 3 % gel AAA TID  g 3    docusate sodium (COLACE) 100 MG capsule Take 1 capsule (100 mg total) by mouth 2 (two) times daily. 60 capsule 3    ergocalciferol (ERGOCALCIFEROL) 50,000 unit Cap Take 1 capsule (50,000 Units total) by mouth every 7 days. 4 capsule 3    insulin (LANTUS SOLOSTAR U-100 INSULIN) glargine 100 units/mL (3mL) SubQ pen Inject 15 Units into the skin every evening. Titrate up to fasting blood sugar less than 130.Max total daily dose of 30 units. 15 mL 6    insulin aspart U-100 (NOVOLOG FLEXPEN U-100 INSULIN) 100 unit/mL (3 mL) InPn pen Inject 5 units with meals plus correction scale.  Max total daily dose of 50 units. 15 mL 6    levothyroxine (SYNTHROID) 150 MCG tablet Take 1 tablet (150 mcg total) by mouth every morning. 30 tablet 3    losartan (COZAAR) 50 MG tablet Take 1 tablet (50 mg total) by " "mouth once daily. 30 tablet 3    montelukast (SINGULAIR) 10 mg tablet Take 1 tablet (10 mg total) by mouth once daily. 30 tablet 3    rosuvastatin (CRESTOR) 40 MG Tab Take 1 tablet (40 mg total) by mouth every evening. 90 tablet 3    XHANCE 93 mcg/actuation AerB USE 1 SPRAY IN EACH NOSTRIL TWICE DAILY AS DIRECTED 16 mL 3    famotidine (PEPCID) 40 MG tablet Take 40 mg by mouth once daily.      glucagon (BAQSIMI) 3 mg/actuation Spry 3 mg (one actuation) into a single nostril; if no response, may repeat in 15 minutes using a new intranasal device. (Patient not taking: Reported on 9/16/2022) 2 each 1    meloxicam (MOBIC) 15 MG tablet Take 1 tablet (15 mg total) by mouth once daily. (Patient not taking: Reported on 9/16/2022) 30 tablet 3    metoprolol succinate (TOPROL-XL) 50 MG 24 hr tablet Take 1 tablet (50 mg total) by mouth once daily. (Patient not taking: Reported on 9/16/2022) 30 tablet 3    nortriptyline (PAMELOR) 10 MG capsule Take 1 capsule (10 mg total) by mouth every evening. (Patient not taking: Reported on 9/16/2022) 30 capsule 3     No current facility-administered medications on file prior to visit.       Vital signs:  /65 (BP Location: Left arm, Patient Position: Sitting, BP Method: Large (Automatic))   Pulse 79   Ht 5' 11" (1.803 m)   Wt 100 kg (220 lb 5.6 oz)   SpO2 97%   BMI 30.73 kg/m²     Physical Exam  Vitals reviewed.   Constitutional:       General: She is not in acute distress.     Appearance: Normal appearance. She is not ill-appearing.      Comments: Frequent belching rule out history and exam   HENT:      Head: Normocephalic.   Cardiovascular:      Rate and Rhythm: Normal rate and regular rhythm.      Heart sounds: Normal heart sounds. No murmur heard.  Pulmonary:      Effort: Pulmonary effort is normal. No respiratory distress.      Breath sounds: Normal breath sounds.   Chest:      Chest wall: No tenderness.   Abdominal:      General: Bowel sounds are normal. There is no " distension.      Palpations: Abdomen is soft.      Tenderness: There is no abdominal tenderness. Negative signs include Rahman's sign.      Hernia: No hernia is present.   Skin:     General: Skin is warm.   Neurological:      Mental Status: She is alert and oriented to person, place, and time.   Psychiatric:         Mood and Affect: Mood normal.         Behavior: Behavior normal.       Routine labs:  Lab Results   Component Value Date    WBC 5.11 07/08/2022    HGB 13.1 07/08/2022    HCT 40.7 07/08/2022    MCV 85 07/08/2022     07/08/2022     No results found for: INR  No results found for: IRON, FERRITIN, TIBC, FESATURATED  Lab Results   Component Value Date     07/08/2022    K 4.0 07/08/2022     07/08/2022    CO2 22 (L) 07/08/2022    BUN 11 07/08/2022    CREATININE 0.9 07/08/2022     Lab Results   Component Value Date    ALBUMIN 3.8 07/08/2022    ALT 19 07/08/2022    AST 15 07/08/2022    ALKPHOS 63 07/08/2022    BILITOT 0.8 07/08/2022     No results found for: GLUCOSE  Lab Results   Component Value Date    TSH 0.768 02/07/2022     Lab Results   Component Value Date    CALCIUM 8.9 07/08/2022       Imaging:        I have reviewed prior labs, imaging, and notes.      Assessment:  1. Gastroesophageal reflux disease without esophagitis        Plan:  Orders Placed This Encounter    sucralfate (CARAFATE) 1 gram tablet    esomeprazole (NEXIUM) 40 MG capsule    Case Request Endoscopy: EGD (ESOPHAGOGASTRODUODENOSCOPY)     EGD to further evaluate continued reflux and belching.  Consider biopsy for H pylori as patient with history.  Patient also with history of Candida.  Increase Nexium to 40 mg daily  Start Carafate she 1 g 3 times a day    Recommend colonoscopy for colon cancer screening as it is unclear whether not she has had previous colonoscopy    Plan of care discussed with patient who is in agreement and verbalized understanding.     I have explained the planned procedures to the patient.The risks,  benefits and alternatives of the procedure were also explained in detail. Patient verbalized understanding, all questions were answered. The patient agrees to proceed as planned    Follow Up:  As needed pending above workup          LUIS M De La Cruz,FNP-BC  [facility]

## 2022-10-03 ENCOUNTER — OFFICE VISIT (OUTPATIENT)
Dept: ORTHOPEDICS | Facility: CLINIC | Age: 47
End: 2022-10-03
Payer: MEDICAID

## 2022-10-03 VITALS
DIASTOLIC BLOOD PRESSURE: 70 MMHG | WEIGHT: 221 LBS | SYSTOLIC BLOOD PRESSURE: 110 MMHG | HEART RATE: 89 BPM | HEIGHT: 71 IN | BODY MASS INDEX: 30.94 KG/M2

## 2022-10-03 DIAGNOSIS — M25.562 CHRONIC PAIN OF LEFT KNEE: Primary | ICD-10-CM

## 2022-10-03 DIAGNOSIS — G89.29 CHRONIC PAIN OF LEFT KNEE: Primary | ICD-10-CM

## 2022-10-03 DIAGNOSIS — M25.9 KNEE JOINT DISORDER: Chronic | ICD-10-CM

## 2022-10-03 PROCEDURE — 4010F PR ACE/ARB THEARPY RXD/TAKEN: ICD-10-PCS | Mod: CPTII,,, | Performed by: ORTHOPAEDIC SURGERY

## 2022-10-03 PROCEDURE — 1159F MED LIST DOCD IN RCRD: CPT | Mod: CPTII,,, | Performed by: ORTHOPAEDIC SURGERY

## 2022-10-03 PROCEDURE — 3008F PR BODY MASS INDEX (BMI) DOCUMENTED: ICD-10-PCS | Mod: CPTII,,, | Performed by: ORTHOPAEDIC SURGERY

## 2022-10-03 PROCEDURE — 3066F NEPHROPATHY DOC TX: CPT | Mod: CPTII,,, | Performed by: ORTHOPAEDIC SURGERY

## 2022-10-03 PROCEDURE — 3061F PR NEG MICROALBUMINURIA RESULT DOCUMENTED/REVIEW: ICD-10-PCS | Mod: CPTII,,, | Performed by: ORTHOPAEDIC SURGERY

## 2022-10-03 PROCEDURE — 3066F PR DOCUMENTATION OF TREATMENT FOR NEPHROPATHY: ICD-10-PCS | Mod: CPTII,,, | Performed by: ORTHOPAEDIC SURGERY

## 2022-10-03 PROCEDURE — 3008F BODY MASS INDEX DOCD: CPT | Mod: CPTII,,, | Performed by: ORTHOPAEDIC SURGERY

## 2022-10-03 PROCEDURE — 99203 PR OFFICE/OUTPT VISIT, NEW, LEVL III, 30-44 MIN: ICD-10-PCS | Mod: S$PBB,,, | Performed by: ORTHOPAEDIC SURGERY

## 2022-10-03 PROCEDURE — 3074F SYST BP LT 130 MM HG: CPT | Mod: CPTII,,, | Performed by: ORTHOPAEDIC SURGERY

## 2022-10-03 PROCEDURE — 99999 PR PBB SHADOW E&M-EST. PATIENT-LVL IV: CPT | Mod: PBBFAC,,, | Performed by: ORTHOPAEDIC SURGERY

## 2022-10-03 PROCEDURE — 3044F PR MOST RECENT HEMOGLOBIN A1C LEVEL <7.0%: ICD-10-PCS | Mod: CPTII,,, | Performed by: ORTHOPAEDIC SURGERY

## 2022-10-03 PROCEDURE — 4010F ACE/ARB THERAPY RXD/TAKEN: CPT | Mod: CPTII,,, | Performed by: ORTHOPAEDIC SURGERY

## 2022-10-03 PROCEDURE — 1160F PR REVIEW ALL MEDS BY PRESCRIBER/CLIN PHARMACIST DOCUMENTED: ICD-10-PCS | Mod: CPTII,,, | Performed by: ORTHOPAEDIC SURGERY

## 2022-10-03 PROCEDURE — 3044F HG A1C LEVEL LT 7.0%: CPT | Mod: CPTII,,, | Performed by: ORTHOPAEDIC SURGERY

## 2022-10-03 PROCEDURE — 3074F PR MOST RECENT SYSTOLIC BLOOD PRESSURE < 130 MM HG: ICD-10-PCS | Mod: CPTII,,, | Performed by: ORTHOPAEDIC SURGERY

## 2022-10-03 PROCEDURE — 3078F PR MOST RECENT DIASTOLIC BLOOD PRESSURE < 80 MM HG: ICD-10-PCS | Mod: CPTII,,, | Performed by: ORTHOPAEDIC SURGERY

## 2022-10-03 PROCEDURE — 99999 PR PBB SHADOW E&M-EST. PATIENT-LVL IV: ICD-10-PCS | Mod: PBBFAC,,, | Performed by: ORTHOPAEDIC SURGERY

## 2022-10-03 PROCEDURE — 99214 OFFICE O/P EST MOD 30 MIN: CPT | Mod: PBBFAC,PN | Performed by: ORTHOPAEDIC SURGERY

## 2022-10-03 PROCEDURE — 1160F RVW MEDS BY RX/DR IN RCRD: CPT | Mod: CPTII,,, | Performed by: ORTHOPAEDIC SURGERY

## 2022-10-03 PROCEDURE — 1159F PR MEDICATION LIST DOCUMENTED IN MEDICAL RECORD: ICD-10-PCS | Mod: CPTII,,, | Performed by: ORTHOPAEDIC SURGERY

## 2022-10-03 PROCEDURE — 99203 OFFICE O/P NEW LOW 30 MIN: CPT | Mod: S$PBB,,, | Performed by: ORTHOPAEDIC SURGERY

## 2022-10-03 PROCEDURE — 3061F NEG MICROALBUMINURIA REV: CPT | Mod: CPTII,,, | Performed by: ORTHOPAEDIC SURGERY

## 2022-10-03 PROCEDURE — 3078F DIAST BP <80 MM HG: CPT | Mod: CPTII,,, | Performed by: ORTHOPAEDIC SURGERY

## 2022-10-05 PROBLEM — G47.33 OSA (OBSTRUCTIVE SLEEP APNEA): Status: ACTIVE | Noted: 2022-10-05

## 2022-10-17 ENCOUNTER — OFFICE VISIT (OUTPATIENT)
Dept: ORTHOPEDICS | Facility: CLINIC | Age: 47
End: 2022-10-17
Payer: MEDICAID

## 2022-10-17 VITALS
HEIGHT: 71 IN | WEIGHT: 218.13 LBS | SYSTOLIC BLOOD PRESSURE: 114 MMHG | DIASTOLIC BLOOD PRESSURE: 73 MMHG | BODY MASS INDEX: 30.54 KG/M2 | HEART RATE: 81 BPM

## 2022-10-17 DIAGNOSIS — G89.29 CHRONIC PAIN OF LEFT KNEE: Primary | ICD-10-CM

## 2022-10-17 DIAGNOSIS — M71.22 BAKER'S CYST OF KNEE, LEFT: ICD-10-CM

## 2022-10-17 DIAGNOSIS — M25.562 CHRONIC PAIN OF LEFT KNEE: Primary | ICD-10-CM

## 2022-10-17 PROCEDURE — 99999 PR PBB SHADOW E&M-EST. PATIENT-LVL IV: CPT | Mod: PBBFAC,,, | Performed by: ORTHOPAEDIC SURGERY

## 2022-10-17 PROCEDURE — 3066F NEPHROPATHY DOC TX: CPT | Mod: CPTII,,, | Performed by: ORTHOPAEDIC SURGERY

## 2022-10-17 PROCEDURE — 3074F SYST BP LT 130 MM HG: CPT | Mod: CPTII,,, | Performed by: ORTHOPAEDIC SURGERY

## 2022-10-17 PROCEDURE — 99999 PR PBB SHADOW E&M-EST. PATIENT-LVL IV: ICD-10-PCS | Mod: PBBFAC,,, | Performed by: ORTHOPAEDIC SURGERY

## 2022-10-17 PROCEDURE — 4010F ACE/ARB THERAPY RXD/TAKEN: CPT | Mod: CPTII,,, | Performed by: ORTHOPAEDIC SURGERY

## 2022-10-17 PROCEDURE — 1159F MED LIST DOCD IN RCRD: CPT | Mod: CPTII,,, | Performed by: ORTHOPAEDIC SURGERY

## 2022-10-17 PROCEDURE — 3061F PR NEG MICROALBUMINURIA RESULT DOCUMENTED/REVIEW: ICD-10-PCS | Mod: CPTII,,, | Performed by: ORTHOPAEDIC SURGERY

## 2022-10-17 PROCEDURE — 3074F PR MOST RECENT SYSTOLIC BLOOD PRESSURE < 130 MM HG: ICD-10-PCS | Mod: CPTII,,, | Performed by: ORTHOPAEDIC SURGERY

## 2022-10-17 PROCEDURE — 4010F PR ACE/ARB THEARPY RXD/TAKEN: ICD-10-PCS | Mod: CPTII,,, | Performed by: ORTHOPAEDIC SURGERY

## 2022-10-17 PROCEDURE — 3061F NEG MICROALBUMINURIA REV: CPT | Mod: CPTII,,, | Performed by: ORTHOPAEDIC SURGERY

## 2022-10-17 PROCEDURE — 3066F PR DOCUMENTATION OF TREATMENT FOR NEPHROPATHY: ICD-10-PCS | Mod: CPTII,,, | Performed by: ORTHOPAEDIC SURGERY

## 2022-10-17 PROCEDURE — 99214 OFFICE O/P EST MOD 30 MIN: CPT | Mod: PBBFAC,PN | Performed by: ORTHOPAEDIC SURGERY

## 2022-10-17 PROCEDURE — 1159F PR MEDICATION LIST DOCUMENTED IN MEDICAL RECORD: ICD-10-PCS | Mod: CPTII,,, | Performed by: ORTHOPAEDIC SURGERY

## 2022-10-17 PROCEDURE — 99213 PR OFFICE/OUTPT VISIT, EST, LEVL III, 20-29 MIN: ICD-10-PCS | Mod: 25,S$PBB,, | Performed by: ORTHOPAEDIC SURGERY

## 2022-10-17 PROCEDURE — 3044F HG A1C LEVEL LT 7.0%: CPT | Mod: CPTII,,, | Performed by: ORTHOPAEDIC SURGERY

## 2022-10-17 PROCEDURE — 3044F PR MOST RECENT HEMOGLOBIN A1C LEVEL <7.0%: ICD-10-PCS | Mod: CPTII,,, | Performed by: ORTHOPAEDIC SURGERY

## 2022-10-17 PROCEDURE — 20610 PR DRAIN/INJECT LARGE JOINT/BURSA: ICD-10-PCS | Mod: S$PBB,LT,, | Performed by: ORTHOPAEDIC SURGERY

## 2022-10-17 PROCEDURE — 3078F PR MOST RECENT DIASTOLIC BLOOD PRESSURE < 80 MM HG: ICD-10-PCS | Mod: CPTII,,, | Performed by: ORTHOPAEDIC SURGERY

## 2022-10-17 PROCEDURE — 20610 DRAIN/INJ JOINT/BURSA W/O US: CPT | Mod: PBBFAC,LT | Performed by: ORTHOPAEDIC SURGERY

## 2022-10-17 PROCEDURE — 3078F DIAST BP <80 MM HG: CPT | Mod: CPTII,,, | Performed by: ORTHOPAEDIC SURGERY

## 2022-10-17 PROCEDURE — 99213 OFFICE O/P EST LOW 20 MIN: CPT | Mod: 25,S$PBB,, | Performed by: ORTHOPAEDIC SURGERY

## 2022-10-17 PROCEDURE — 20610 DRAIN/INJ JOINT/BURSA W/O US: CPT | Mod: S$PBB,LT,, | Performed by: ORTHOPAEDIC SURGERY

## 2022-10-17 RX ORDER — TRIAMCINOLONE ACETONIDE 40 MG/ML
40 INJECTION, SUSPENSION INTRA-ARTICULAR; INTRAMUSCULAR
Status: DISCONTINUED | OUTPATIENT
Start: 2022-10-17 | End: 2022-10-17 | Stop reason: HOSPADM

## 2022-10-17 RX ADMIN — TRIAMCINOLONE ACETONIDE 40 MG: 40 INJECTION, SUSPENSION INTRA-ARTICULAR; INTRAMUSCULAR at 02:10

## 2022-10-17 NOTE — PROGRESS NOTES
Patient ID: Javier Enciso is a 47 y.o. female    Pain of the Left Knee      History of Present Illness:    Javire Enciso presents to clinic for left knee pain Patient denies known YAQUELIN. The pain started 1  year  ago and is becoming progressively worse.  Pain is located over (points to) posterior left knee. She reports that the pain is a 7 /10 pulling pain today. The pain is affecting ADLs and limiting desired level of activity. Denies numbness, tingling, radiation and inability to bear weight.  Pain is 8 /10 at its worst. Reports persistent clicking, locking and instability subjectively after a fall 1 year ago.     Trail of  voltaren gel and intermittent ibuprofen  with no improvement.     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  They report no changes since I saw them last.  Still having pain mostly lateral in the knee and occasionally posterior medial.      Lab Results   Component Value Date    HGBA1C 6.9 (H) 10/05/2022       PAST MEDICAL HISTORY:   Past Medical History:   Diagnosis Date    Diabetes mellitus, type 2      PAST SURGICAL HISTORY:   Past Surgical History:   Procedure Laterality Date    BREAST CYST ASPIRATION Left 1996    BREAST CYST ASPIRATION Right 2001     FAMILY HISTORY:   Family History   Problem Relation Age of Onset    Diabetes Mother     Diabetes Father     Breast cancer Maternal Aunt      SOCIAL HISTORY:   Social History     Occupational History    Not on file   Tobacco Use    Smoking status: Never    Smokeless tobacco: Never   Substance and Sexual Activity    Alcohol use: Yes     Comment: rare    Drug use: Never    Sexual activity: Yes        MEDICATIONS:   Current Outpatient Medications:     ALPRAZolam (XANAX) 0.25 MG tablet, Take 1 tablet (0.25 mg total) by mouth nightly as needed for Anxiety.,  "Disp: 30 tablet, Rfl: 3    amLODIPine (NORVASC) 10 MG tablet, Take 1 tablet (10 mg total) by mouth once daily., Disp: 30 tablet, Rfl: 3    BD VARUN 2ND GEN PEN NEEDLE 32 gauge x 5/32" Ndle, To use with insulin injections 5 times per day, Disp: 150 each, Rfl: 11    butalbital-acetaminophen-caffeine -40 mg (FIORICET, ESGIC) -40 mg per tablet, Take 1 tablet by mouth every 8 (eight) hours as needed for Headaches (every 8 hours prn)., Disp: 30 tablet, Rfl: 3    cetirizine (ZYRTEC) 10 MG tablet, Take 1 tablet (10 mg total) by mouth nightly., Disp: 30 tablet, Rfl: 3    diclofenac sodium (SOLARAZE) 3 % gel, AAA TID PRN, Disp: 100 g, Rfl: 3    docusate sodium (COLACE) 100 MG capsule, Take 1 capsule (100 mg total) by mouth 2 (two) times daily., Disp: 60 capsule, Rfl: 3    ergocalciferol (ERGOCALCIFEROL) 50,000 unit Cap, Take 1 capsule (50,000 Units total) by mouth every 7 days., Disp: 4 capsule, Rfl: 3    glucagon (BAQSIMI) 3 mg/actuation Spry, 3 mg (one actuation) into a single nostril; if no response, may repeat in 15 minutes using a new intranasal device., Disp: 2 each, Rfl: 1    insulin (LANTUS SOLOSTAR U-100 INSULIN) glargine 100 units/mL SubQ pen, Inject 15 Units into the skin every evening. Titrate up to fasting blood sugar less than 130.Max total daily dose of 30 units., Disp: 4.5 mL, Rfl: 3    insulin aspart U-100 (NOVOLOG FLEXPEN U-100 INSULIN) 100 unit/mL (3 mL) InPn pen, Inject 5 units with meals plus correction scale.  Max total daily dose of 50 units., Disp: 15 mL, Rfl: 6    levothyroxine (SYNTHROID) 150 MCG tablet, Take 1 tablet (150 mcg total) by mouth every morning., Disp: 30 tablet, Rfl: 3    losartan (COZAAR) 50 MG tablet, Take 1 tablet (50 mg total) by mouth once daily., Disp: 30 tablet, Rfl: 3    metoclopramide HCl (REGLAN) 5 mg/5 mL Soln, Take 5 mLs (5 mg total) by mouth 4 (four) times daily before meals and nightly., Disp: 600 mL, Rfl: 3    metoprolol succinate (TOPROL-XL) 50 MG 24 hr tablet, " Take 1 tablet (50 mg total) by mouth once daily., Disp: 30 tablet, Rfl: 3    montelukast (SINGULAIR) 10 mg tablet, Take 1 tablet (10 mg total) by mouth once daily., Disp: 30 tablet, Rfl: 3    nortriptyline (PAMELOR) 10 MG capsule, Take 1 capsule (10 mg total) by mouth every evening., Disp: 30 capsule, Rfl: 3    rosuvastatin (CRESTOR) 40 MG Tab, Take 1 tablet (40 mg total) by mouth every evening., Disp: 90 tablet, Rfl: 3    sucralfate (CARAFATE) 1 gram tablet, Take 1 tablet (1 g total) by mouth 3 (three) times daily., Disp: 90 tablet, Rfl: 3    XHANCE 93 mcg/actuation AerB, USE 1 SPRAY IN EACH NOSTRIL TWICE DAILY AS DIRECTED, Disp: 16 mL, Rfl: 3    esomeprazole (NEXIUM) 40 MG capsule, Take 1 capsule (40 mg total) by mouth before breakfast., Disp: 30 capsule, Rfl: 3  ALLERGIES:   Review of patient's allergies indicates:   Allergen Reactions    Hydrocodone Itching    Hydrocodone-acetaminophen      Itchy (skin)^         Physical Exam     Vitals:    10/17/22 1331   BP: 114/73   Pulse: 81     Alert and oriented to person, place and time. No acute distress. Well-groomed, not ill appearing. Pupils round and reactive, normal respiratory effort, no audible wheezing.     OBSERVATION / INSPECTION   Gait:   Nonantalgic   Alignment:  Neutral   Scars:   None   Muscle atrophy: None  Effusion:  None   Warmth:  None   Discoloration:   None     TENDERNESS                 Patella     Negative    Peripatellar medial    Negative   Peripatellar lateral   Negative   Patellar tendon   Negative   Quad tendon     Negative    Prepatellar Bursa   Negative     Tibial tubercle    Negative    Pes anserine/HS   Negative      ITB     Negative      LCL     Negative  MFC     Negative  LFC     Negative  MCL      Negative  Medial Joint Line    POS left   Lateral Joint Line   POS left  Quadriceps    Negative  Hamstring     POS left            Range of  Motion:        Left knee:   5-130°     Patellofemoral examination:     Patella position     Centered  Crepitus (PF):    Absent   Lateral tilt:    Normal   J-sign:     None   Patellofemoral grind:   No pain       MENISCAL SIGNS:     Pain on terminal extension:  +  Pain on terminal flexion:  +  Александрs maneuver:  + medial and lateral      LIGAMENT EXAMINATION:  ACL / Lachman:  normal   PCL-Post.  drawer: normal 0 to 2mm  MCL- Valgus:  normal 0 to 2mm  LCL- Varus:    normal 0 to 2mm    Dial Test: difference c/w other side  At 30° flexion: normal (< 5°)   At 90° flexion: normal (< 5°)       STRENGTH: (* = with pain)   Quadricep   5/5  Hamstrin/5    EXTREMITY NEURO-VASCULAR EXAMINATION:   Sensation:  Grossly intact to light touch all dermatomal regions.   Motor Function:  Fully intact motor function at hip, knee, foot and ankle    DTRs;  quadriceps and  achilles 2+.  No clonus and downgoing Babinski.    Vascular status:  DP and PT pulses 2+, brisk capillary refill, symmetric.     Imaging:     left Knee X-rays ordered/reviewed by me showing no evidence of fracture or dislocation. There is no obvious malalignment. No evidence of masses, lesions or foreign bodies. Mild medial femorotibial and lateral patellofemoral joint space narrowing.     Left knee MRI reviewed showing parameniscal cyst no evidence of marcelo tear in the meniscus.  ACL intact.    Assessment & Plan    Chronic pain of left knee    Baker's cyst of knee, left    I made the decision to obtain old records of the patient including previous notes and imaging. New imaging, if ordered today of the extremity or extremities, were evaluated. I independently reviewed and interpreted the radiographs and/or MRIs/CT scan today as well as prior imaging.    I believe that this is likely small tear in the meniscus causing Baker cyst    We discussed at length different treatment options including anti-inflammatories, acetaminophen, rest, ice, heat, formal physical therapy including strengthening and stretching exercises, home exercise programs,  injections, dry needling, and finally surgical intervention if conservative treatment fails.       Patient would like to proceed with a trial of:     Trial of Anti-Inflammatories  Corticosteroid Injection left knee     Consider knee arthroscopy with possible meniscus debridement versus repair if pain is refractory to conservative treatment    All questions were answered and patient is agreeable to the above plan.       Follow-up 6 weeks

## 2022-10-17 NOTE — PROCEDURES
Large Joint Aspiration/Injection: L knee joint    Date/Time: 10/17/2022 2:00 PM  Performed by: Tim Soto MD  Authorized by: Tim Soto MD     Consent Done?:  Yes (Verbal)  Indications:  Pain  Site marked: the procedure site was marked    Timeout: prior to procedure the correct patient, procedure, and site was verified      Details:  Needle Size:  22 G  Approach:  Anterolateral  Location:  Knee  Site:  L knee joint  Medications:  40 mg triamcinolone acetonide 40 mg/mL  Patient tolerance:  Patient tolerated the procedure well with no immediate complications

## 2022-11-21 ENCOUNTER — OFFICE VISIT (OUTPATIENT)
Dept: ORTHOPEDICS | Facility: CLINIC | Age: 47
End: 2022-11-21
Payer: MEDICAID

## 2022-11-21 VITALS
HEART RATE: 87 BPM | BODY MASS INDEX: 30.16 KG/M2 | SYSTOLIC BLOOD PRESSURE: 136 MMHG | WEIGHT: 222.69 LBS | HEIGHT: 72 IN | DIASTOLIC BLOOD PRESSURE: 85 MMHG

## 2022-11-21 DIAGNOSIS — G89.29 CHRONIC PAIN OF LEFT KNEE: Primary | ICD-10-CM

## 2022-11-21 DIAGNOSIS — M25.562 CHRONIC PAIN OF LEFT KNEE: Primary | ICD-10-CM

## 2022-11-21 DIAGNOSIS — M71.22 BAKER'S CYST OF KNEE, LEFT: ICD-10-CM

## 2022-11-21 PROCEDURE — 99214 PR OFFICE/OUTPT VISIT, EST, LEVL IV, 30-39 MIN: ICD-10-PCS | Mod: S$PBB,,, | Performed by: ORTHOPAEDIC SURGERY

## 2022-11-21 PROCEDURE — 1159F PR MEDICATION LIST DOCUMENTED IN MEDICAL RECORD: ICD-10-PCS | Mod: CPTII,,, | Performed by: ORTHOPAEDIC SURGERY

## 2022-11-21 PROCEDURE — 4010F PR ACE/ARB THEARPY RXD/TAKEN: ICD-10-PCS | Mod: CPTII,,, | Performed by: ORTHOPAEDIC SURGERY

## 2022-11-21 PROCEDURE — 3075F PR MOST RECENT SYSTOLIC BLOOD PRESS GE 130-139MM HG: ICD-10-PCS | Mod: CPTII,,, | Performed by: ORTHOPAEDIC SURGERY

## 2022-11-21 PROCEDURE — 3044F HG A1C LEVEL LT 7.0%: CPT | Mod: CPTII,,, | Performed by: ORTHOPAEDIC SURGERY

## 2022-11-21 PROCEDURE — 3008F PR BODY MASS INDEX (BMI) DOCUMENTED: ICD-10-PCS | Mod: CPTII,,, | Performed by: ORTHOPAEDIC SURGERY

## 2022-11-21 PROCEDURE — 3079F DIAST BP 80-89 MM HG: CPT | Mod: CPTII,,, | Performed by: ORTHOPAEDIC SURGERY

## 2022-11-21 PROCEDURE — 3075F SYST BP GE 130 - 139MM HG: CPT | Mod: CPTII,,, | Performed by: ORTHOPAEDIC SURGERY

## 2022-11-21 PROCEDURE — 99214 OFFICE O/P EST MOD 30 MIN: CPT | Mod: PBBFAC,PN | Performed by: ORTHOPAEDIC SURGERY

## 2022-11-21 PROCEDURE — 4010F ACE/ARB THERAPY RXD/TAKEN: CPT | Mod: CPTII,,, | Performed by: ORTHOPAEDIC SURGERY

## 2022-11-21 PROCEDURE — 3079F PR MOST RECENT DIASTOLIC BLOOD PRESSURE 80-89 MM HG: ICD-10-PCS | Mod: CPTII,,, | Performed by: ORTHOPAEDIC SURGERY

## 2022-11-21 PROCEDURE — 99214 OFFICE O/P EST MOD 30 MIN: CPT | Mod: S$PBB,,, | Performed by: ORTHOPAEDIC SURGERY

## 2022-11-21 PROCEDURE — 3066F NEPHROPATHY DOC TX: CPT | Mod: CPTII,,, | Performed by: ORTHOPAEDIC SURGERY

## 2022-11-21 PROCEDURE — 3066F PR DOCUMENTATION OF TREATMENT FOR NEPHROPATHY: ICD-10-PCS | Mod: CPTII,,, | Performed by: ORTHOPAEDIC SURGERY

## 2022-11-21 PROCEDURE — 99999 PR PBB SHADOW E&M-EST. PATIENT-LVL IV: ICD-10-PCS | Mod: PBBFAC,,, | Performed by: ORTHOPAEDIC SURGERY

## 2022-11-21 PROCEDURE — 3044F PR MOST RECENT HEMOGLOBIN A1C LEVEL <7.0%: ICD-10-PCS | Mod: CPTII,,, | Performed by: ORTHOPAEDIC SURGERY

## 2022-11-21 PROCEDURE — 3061F NEG MICROALBUMINURIA REV: CPT | Mod: CPTII,,, | Performed by: ORTHOPAEDIC SURGERY

## 2022-11-21 PROCEDURE — 3008F BODY MASS INDEX DOCD: CPT | Mod: CPTII,,, | Performed by: ORTHOPAEDIC SURGERY

## 2022-11-21 PROCEDURE — 99999 PR PBB SHADOW E&M-EST. PATIENT-LVL IV: CPT | Mod: PBBFAC,,, | Performed by: ORTHOPAEDIC SURGERY

## 2022-11-21 PROCEDURE — 1159F MED LIST DOCD IN RCRD: CPT | Mod: CPTII,,, | Performed by: ORTHOPAEDIC SURGERY

## 2022-11-21 PROCEDURE — 3061F PR NEG MICROALBUMINURIA RESULT DOCUMENTED/REVIEW: ICD-10-PCS | Mod: CPTII,,, | Performed by: ORTHOPAEDIC SURGERY

## 2022-11-21 RX ORDER — MUPIROCIN 20 MG/G
OINTMENT TOPICAL
Status: CANCELLED | OUTPATIENT
Start: 2022-11-21

## 2022-11-21 RX ORDER — CEFAZOLIN SODIUM/D5W 2 G/100 ML
2 PLASTIC BAG, INJECTION (ML) INTRAVENOUS
Status: CANCELLED | OUTPATIENT
Start: 2022-11-21

## 2022-11-21 NOTE — PROGRESS NOTES
Patient ID: Javier Enciso is a 47 y.o. female    Pain of the Left Knee      History of Present Illness:    Javier Enciso presents to clinic for left knee pain Patient denies known YAQUELIN. The pain started 1  year  ago and is becoming progressively worse.  Pain is located over (points to) posterior left knee. She reports that the pain is a 7 /10 pulling pain today. The pain is affecting ADLs and limiting desired level of activity. Denies numbness, tingling, radiation and inability to bear weight.  Pain is 8 /10 at its worst. Reports persistent clicking, locking and instability subjectively after a fall 1 year ago.     Trail of  voltaren gel and intermittent ibuprofen  with no improvement.     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  At our last visit we did a steroid injection of her left knee.  She reports relief for about 3-5 days and then her pain returned.  Still complaining of tightness and pain and buckling sensation in the knee.        Lab Results   Component Value Date    HGBA1C 6.9 (H) 10/05/2022       PAST MEDICAL HISTORY:   Past Medical History:   Diagnosis Date    Allergies     Diabetes mellitus, type 2     High cholesterol     Hypertension     Thyroid disease      PAST SURGICAL HISTORY:   Past Surgical History:   Procedure Laterality Date    BREAST CYST ASPIRATION Left 1996    BREAST CYST ASPIRATION Right 2001    ESOPHAGOGASTRODUODENOSCOPY N/A 10/26/2022    Procedure: EGD (ESOPHAGOGASTRODUODENOSCOPY);  Surgeon: Dale Bray MD;  Location: [facility];  Service: Endoscopy;  Laterality: N/A;    HYSTERECTOMY      L WRIST SURGERY Left     THYROID SURGERY       FAMILY HISTORY:   Family History   Problem Relation Age of Onset    Diabetes Mother     Diabetes Father     Breast cancer Maternal Aunt      SOCIAL HISTORY:  "  Social History     Occupational History    Not on file   Tobacco Use    Smoking status: Never    Smokeless tobacco: Never   Substance and Sexual Activity    Alcohol use: Yes     Comment: rare    Drug use: Never    Sexual activity: Yes        MEDICATIONS:   Current Outpatient Medications:     ALPRAZolam (XANAX) 0.25 MG tablet, Take 1 tablet (0.25 mg total) by mouth nightly as needed for Anxiety., Disp: 30 tablet, Rfl: 3    amLODIPine (NORVASC) 10 MG tablet, Take 1 tablet (10 mg total) by mouth once daily., Disp: 30 tablet, Rfl: 3    BD VARUN 2ND GEN PEN NEEDLE 32 gauge x 5/32" Ndle, To use with insulin injections 5 times per day, Disp: 150 each, Rfl: 11    butalbital-acetaminophen-caffeine -40 mg (FIORICET, ESGIC) -40 mg per tablet, Take 1 tablet by mouth every 8 (eight) hours as needed for Headaches (every 8 hours prn)., Disp: 30 tablet, Rfl: 3    cetirizine (ZYRTEC) 10 MG tablet, Take 1 tablet (10 mg total) by mouth nightly., Disp: 30 tablet, Rfl: 3    diclofenac sodium (SOLARAZE) 3 % gel, AAA TID PRN, Disp: 100 g, Rfl: 3    docusate sodium (COLACE) 100 MG capsule, Take 1 capsule (100 mg total) by mouth 2 (two) times daily., Disp: 60 capsule, Rfl: 3    ergocalciferol (ERGOCALCIFEROL) 50,000 unit Cap, Take 1 capsule (50,000 Units total) by mouth every 7 days., Disp: 4 capsule, Rfl: 3    esomeprazole (NEXIUM) 40 MG capsule, Take 1 capsule (40 mg total) by mouth before breakfast., Disp: 30 capsule, Rfl: 3    glucagon (BAQSIMI) 3 mg/actuation Spry, 3 mg (one actuation) into a single nostril; if no response, may repeat in 15 minutes using a new intranasal device., Disp: 2 each, Rfl: 1    insulin (LANTUS SOLOSTAR U-100 INSULIN) glargine 100 units/mL SubQ pen, Inject 15 Units into the skin every evening. Titrate up to fasting blood sugar less than 130.Max total daily dose of 30 units., Disp: 4.5 mL, Rfl: 3    levothyroxine (SYNTHROID) 150 MCG tablet, Take 1 tablet (150 mcg total) by mouth every morning., " Disp: 30 tablet, Rfl: 3    losartan (COZAAR) 50 MG tablet, Take 1 tablet (50 mg total) by mouth once daily., Disp: 30 tablet, Rfl: 3    metoclopramide HCl (REGLAN) 5 mg/5 mL Soln, Take 5 mLs (5 mg total) by mouth 4 (four) times daily before meals and nightly., Disp: 600 mL, Rfl: 3    metoprolol succinate (TOPROL-XL) 50 MG 24 hr tablet, Take 1 tablet (50 mg total) by mouth once daily., Disp: 30 tablet, Rfl: 3    montelukast (SINGULAIR) 10 mg tablet, Take 1 tablet (10 mg total) by mouth once daily., Disp: 30 tablet, Rfl: 3    nortriptyline (PAMELOR) 10 MG capsule, Take 1 capsule (10 mg total) by mouth every evening., Disp: 30 capsule, Rfl: 3    polyethylene glycol (GLYCOLAX) 17 gram PwPk, Take 17 g by mouth once daily., Disp: 60 packet, Rfl: 3    rosuvastatin (CRESTOR) 40 MG Tab, Take 1 tablet (40 mg total) by mouth every evening., Disp: 30 tablet, Rfl: 3    sucralfate (CARAFATE) 100 mg/mL suspension, Take 10 mLs (1 g total) by mouth 4 (four) times daily before meals and nightly., Disp: 1200 mL, Rfl: 3    XHANCE 93 mcg/actuation AerB, USE 1 SPRAY IN EACH NOSTRIL TWICE DAILY AS DIRECTED, Disp: 16 mL, Rfl: 3  ALLERGIES:   Review of patient's allergies indicates:   Allergen Reactions    Hydrocodone Itching    Hydrocodone-acetaminophen      Itchy (skin)^         Physical Exam     Vitals:    11/21/22 0929   BP: 136/85   Pulse: 87     Alert and oriented to person, place and time. No acute distress. Well-groomed, not ill appearing. Pupils round and reactive, normal respiratory effort, no audible wheezing.     OBSERVATION / INSPECTION   Gait:   Nonantalgic   Alignment:  Neutral   Scars:   None   Muscle atrophy: None  Effusion:  None   Warmth:  None   Discoloration:   None     TENDERNESS                 Patella     Negative    Peripatellar medial    Negative   Peripatellar lateral   Negative   Patellar tendon   Negative   Quad tendon     Negative    Prepatellar Bursa   Negative     Tibial tubercle    Negative    Pes  anserine/HS   positive      ITB     Negative      LCL     Negative  MFC     Negative  LFC     Negative  MCL      Negative  Medial Joint Line    POS left   Lateral Joint Line   POS left  Quadriceps    Negative  Hamstring     POS left            Range of  Motion:        Left knee:   5-130°     Patellofemoral examination:     Patella position    Centered  Crepitus (PF):    Absent   Lateral tilt:    Normal   J-sign:     None   Patellofemoral grind:   No pain       MENISCAL SIGNS:     Pain on terminal extension:  +  Pain on terminal flexion:  +  Александрs maneuver:  + medial and lateral      LIGAMENT EXAMINATION:  ACL / Lachman:  normal   PCL-Post.  drawer: normal 0 to 2mm  MCL- Valgus:  normal 0 to 2mm  LCL- Varus:    normal 0 to 2mm    Dial Test: difference c/w other side  At 30° flexion: normal (< 5°)   At 90° flexion: normal (< 5°)       STRENGTH: (* = with pain)   Quadricep   5/5  Hamstrin/5    EXTREMITY NEURO-VASCULAR EXAMINATION:   Sensation:  Grossly intact to light touch all dermatomal regions.   Motor Function:  Fully intact motor function at hip, knee, foot and ankle    DTRs;  quadriceps and  achilles 2+.  No clonus and downgoing Babinski.    Vascular status:  DP and PT pulses 2+, brisk capillary refill, symmetric.     Imaging:     left Knee X-rays ordered/reviewed by me showing no evidence of fracture or dislocation. There is no obvious malalignment. No evidence of masses, lesions or foreign bodies. Mild medial femorotibial and lateral patellofemoral joint space narrowing.     Left knee MRI reviewed showing parameniscal cyst no evidence of marcelo tear in the meniscus.  ACL intact.    Assessment & Plan    Chronic pain of left knee    Baker's cyst of knee, left      Treatment options were discussed with her in detail.  She has failed conservative treatment with injection and home exercise program.  We discussed options including diagnostic arthroscopy of the left knee with indicated procedures  including possible removal of the meniscal cyst and possible meniscus repair versus debridement and other indicated procedures.  We went over the rehabilitation protocol associated with surgery.    _________________________________________________________________        Javier Enciso will be scheduled for the following procedure(s):     Left knee diagnostic arthroscopy with possible meniscus debridement versus repair  Left knee arthroscopy with possible ganglion cyst decompression  All other indicated procedures        We will arrange for surgery January 9th.  will see Adrianne Dumont for preoperative appointment.

## 2022-12-10 ENCOUNTER — PATIENT MESSAGE (OUTPATIENT)
Dept: DIABETES | Facility: CLINIC | Age: 47
End: 2022-12-10
Payer: MEDICAID

## 2022-12-12 NOTE — PROGRESS NOTES
CC:   Chief Complaint   Patient presents with    Diabetes Mellitus       HPI: Javier Enciso is a 47 y.o. female presents for a follow up visit today for the management of T2DM.     She was diagnosed with prediabetes in 2018 and was on Metformin, developed T2DM around 2020. She went to the ER in December 2021 and she was started on insulin therapy.     Family hx of diabetes: mother and father   Hospitalized for diabetes: denies   Insulin therapy: December 2021     No personal or FH of thyroid cancer or personal of pancreatic cancer or pancreatitis.     Hx of thyroidectomy due to cysts- January 2012-- not cancerous. LT4 150 mcg daily   Last TSH WNL       Our last visit was in May 2022   Saw Dr. Goyal in August 2022-   He continued her insulin doses and continued her dexcom   Had elevated TOM but normal/high c-peptide.   repeat diabetes antibodies and Cpeptide next time.   if Type 2 or JIMENA with okay Cpeptide, consider stopping novolog and using GLP1 agonist instead.   if type 1, likely stick with insulin, re-evaluate potential for pumps    Repeat C-peptide level was elevated again--can be treated more as a type 2    See dexcom download   Missing doses of Novolog            DIABETES COMPLICATIONS: peripheral neuropathy      Diabetes Management Status    ASA: stopped ASA     Statin: Taking--Crestor 40 mg  ACE/ARB: Taking--losartan 50 mg      The 10-year ASCVD risk score (Kizzy HARRISON, et al., 2019) is: 10.8%    Values used to calculate the score:      Age: 47 years      Sex: Female      Is Non- : Yes      Diabetic: Yes      Tobacco smoker: No      Systolic Blood Pressure: 138 mmHg      Is BP treated: Yes      HDL Cholesterol: 38 mg/dL      Total Cholesterol: 139 mg/dL      Screening or Prevention Patient's value Goal Complete/Controlled?   HgA1C Testing and Control   Lab Results   Component Value Date    HGBA1C 6.9 (H) 10/05/2022      Annually/Less than 8% No   Lipid profile : 10/05/2022  Annually Yes   LDL control Lab Results   Component Value Date    LDLCALC 85.8 10/05/2022    Annually/Less than 100 mg/dl  No   Nephropathy screening Lab Results   Component Value Date    LABMICR <5.0 10/05/2022     Lab Results   Component Value Date    PROTEINUA Negative 10/05/2022    Annually No   Blood pressure BP Readings from Last 1 Encounters:   12/13/22 138/82    Less than 140/90 Yes   Dilated retinal exam Most Recent Eye Exam Date: Not Found Annually No   Foot exam   : 02/21/2022 Annually No       CURRENT A1C:    Hemoglobin A1C   Date Value Ref Range Status   10/05/2022 6.9 (H) 4.0 - 5.6 % Final     Comment:     ADA Screening Guidelines:  5.7-6.4%  Consistent with prediabetes  >or=6.5%  Consistent with diabetes    High levels of fetal hemoglobin interfere with the HbA1C  assay. Heterozygous hemoglobin variants (HbS, HgC, etc)do  not significantly interfere with this assay.   However, presence of multiple variants may affect accuracy.     07/08/2022 6.4 (H) 4.0 - 5.6 % Final     Comment:     ADA Screening Guidelines:  5.7-6.4%  Consistent with prediabetes  >or=6.5%  Consistent with diabetes    High levels of fetal hemoglobin interfere with the HbA1C  assay. Heterozygous hemoglobin variants (HbS, HgC, etc)do  not significantly interfere with this assay.   However, presence of multiple variants may affect accuracy.     04/14/2022 6.7 (H) 4.0 - 5.6 % Final     Comment:     ADA Screening Guidelines:  5.7-6.4%  Consistent with prediabetes  >or=6.5%  Consistent with diabetes    High levels of fetal hemoglobin interfere with the HbA1C  assay. Heterozygous hemoglobin variants (HbS, HgC, etc)do  not significantly interfere with this assay.   However, presence of multiple variants may affect accuracy.         GOAL A1C: 6.5% without hypoglycemia       DM MEDICATIONS USED IN THE PAST: Lantus, Novolog   Metformin -- wasn't working   dexcom       CURRENT DIABETES MEDICATIONS: Lantus 15 units nightly (9PM) and Novolog 5  units TID Ac  (usually only taking with dinner) -- generally skipping the Novolog with breakfast and lunch   Insulin:  pens.    Missed doses: missing doses of novolog   Occ missing the Lantus -- maybe once a month         BLOOD GLUCOSE MONITORING:   Sensor type: Dexcom G6   Average BG readin  Time in range: 59%   Estimated A1c is 7.6%   31% high and 10% very high   0% low and 0% very low   Site change:  q10 days    Two weeks prior the average was 156 in target range 74% of the time with an estimated A1c of 7%      Supplies from [facility]     Sensor was downloaded in clinic today and reviewed with patient.   Please see attached document for download.       HYPOGLYCEMIA: 0% on dexcom download   <1% low - 2 weeks prior   Glucagon kit: Baqsimi    Medic alert bracelet: denies       MEALS: eating 3 meals per day   BF: cup of coffee, oatmeal with waffle or wheat bread with egg- or [employer]   Lunch: job pays for lunch--- fast food- or meat with veggie - or Burger and No fries   Dinner: veggie with meat -- rarely does rice -- spinach, [place] cut green beans or broccoli   Snack: rarely   Drinks: -- previously was drinking cranberry juice -- she was doing sugary beverage until recently   So she stopped the juices   Water makes her own tea with sugar substitute          CURRENT EXERCISE:  No      Review of Systems  Review of Systems   Constitutional:  Negative for appetite change, fatigue and unexpected weight change.   HENT:  Negative for trouble swallowing.    Eyes:  Negative for visual disturbance.   Respiratory:  Negative for shortness of breath.    Cardiovascular:  Negative for chest pain.   Gastrointestinal:  Negative for nausea.   Endocrine: Negative for polydipsia, polyphagia and polyuria.   Genitourinary:         No Nocturia    Musculoskeletal:  Positive for arthralgias and back pain.   Skin:  Negative for wound.   Neurological:  Positive for numbness.     Physical Exam   Physical Exam  Vitals and nursing note  reviewed.   Constitutional:       Appearance: She is well-developed.      Comments: Overweight female   HENT:      Head: Normocephalic and atraumatic.      Right Ear: External ear normal.      Left Ear: External ear normal.      Nose: Nose normal.   Neck:      Thyroid: No thyromegaly.      Trachea: No tracheal deviation.   Cardiovascular:      Rate and Rhythm: Normal rate and regular rhythm.      Heart sounds: No murmur heard.  Pulmonary:      Effort: Pulmonary effort is normal. No respiratory distress.      Breath sounds: Normal breath sounds.   Abdominal:      Palpations: Abdomen is soft.      Tenderness: There is no abdominal tenderness.      Hernia: No hernia is present.   Musculoskeletal:      Cervical back: Normal range of motion and neck supple.   Skin:     General: Skin is warm and dry.      Capillary Refill: Capillary refill takes less than 2 seconds.      Findings: No rash.      Comments: Injection sites and dexcom sites are normal appearing. No lipo hypertropthy or atrophy     Neurological:      Mental Status: She is alert and oriented to person, place, and time.      Cranial Nerves: No cranial nerve deficit.   Psychiatric:         Behavior: Behavior normal.         Judgment: Judgment normal.       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)  + TOM   c-peptide level WNL X2 now   Can treat as T2DM   Discussed trying to titrate back/ come off insulin and use GLP1 or SGLT2   She is open to trying GLP1 today   Dexcom is helping with self awareness and self accountability    Uncontrolled   BG readings above goal on dexcom   Missing doses of Novolog       -- Medication Changes:   Continue Lantus 15 units nightly     Stop the scheduled Novolog   Use Novolog sliding scale as needed   With using correction scale:: ONLY ON A BEFORE MEAL BLOOD SUGAR READING ELEVATION   150-200: +2  units  201-250: +4 units  251-300: +6 units  301-350: +8 units  >350: +10 units    Start Ozempic 0.25 mg weekly for 4 weeks & then 0.5 mg weekly thereafter.  Injected into the abdomen only   Injected ONLY ONCE A WEEK   Please notify me for any abdominal pain, nausea, vomiting, diarrhea, constipation, acid reflux     Continue to use the dexcom        -- Reviewed goals of therapy are to get the best control we can without hypoglycemia.  -- Reviewed patient's current insulin regimen. Clarified proper insulin dose and timing in relation to meals, etc. Insulin injection sites and proper rotation instructed.    -- Advised frequent self blood glucose monitoring.  Patient encouraged to document glucose results and bring them to every clinic visit.  Continue to use the dexcom personal CGM   -- Hypoglycemia precautions discussed. Instructed on precautions before driving.    -- Call for Bg repeatedly < 70 or > 180.   -- Close adherence to lifestyle changes recommended.   -- Periodic follow ups for eye evaluations, foot care and dental care suggested.        Patient has diabetes mellitus and manages diabetes with intensive insulin regimen and uses prandial and basal insulin daily  Patient requires a therapeutic CGM and is willing to use therapeutic CGM for the necessary frequent adjustments of insulin therapy.  I have completed an in-person visit during the previous 6 months and will continue to have in-person visits every 6 months to assess adherence to their CGM regimen and diabetes treatment plan.  Due to COVID pandemic and need for remote monitoring this tool is medically necessary            Type 2 diabetes mellitus with diabetic polyneuropathy, with long-term current use of insulin  Optimize BG readings.   See above.    she is following with an external podiatrist    Educated patient to check feet daily for any foreign objects and/or wounds. Discussed with patient the importance of wearing appropriate footwear at all  times, not to walk barefoot ever, and to check shoes before putting them on feet. Instructed patient to keep feet dry by regularly changing shoes and socks and drying feet after baths and exercises. Also, instructed patient to report any new lesions, discolorations, or swelling to a healthcare professional.        Primary hypertension  BP goal is < 140/90.   Tolerating ARB  Controlled   Blood pressure goals discussed with patient      Pure hypercholesterolemia  On statin per ADA recommendations  LDL goal < 100. LDL at goal. LFTs WNL. Continue statin.         Class 1 obesity due to excess calories with serious comorbidity and body mass index (BMI) of 30.0 to 30.9 in adult  Body mass index is 30.52 kg/m².  Increases insulin resistance.   Discussed DM diet and exercise.       Postoperative hypothyroidism  TSH WNL, on LT4 150 mcg, reinforced take by itself on empty stomach, first thing in the morning and wait at least 30-60 minutes to eat, drink, or take other medications.      Seen by General Endocrine in August of 2022-- Dr. Kumar     History of thyroidectomy, total  On LT4   Reports cysts that were removed were  Benign  Evaluated by General Endocrine in August of 2022-- Dr. NAHUN Santos        Spent 25 minutes with patient with > 50% time spent in counseling, as noted above on medication options, T1 vs T2, diet, exercise, insulin, GLP1       Follow up in about 3 months (around 3/13/2023).  Follow up with me in 3 months   Labs prior to follow up         Orders Placed This Encounter   Procedures    Basic Metabolic Panel     Standing Status:   Future     Standing Expiration Date:   6/13/2024    Hemoglobin A1C     Standing Status:   Future     Standing Expiration Date:   6/13/2024       Recommendations were discussed with the patient in detail  The patient verbalized understanding and agrees with the plan outlined as above.     This note was partly generated with [employer] voice recognition software. I apologize for any  possible typographical errors.

## 2022-12-13 ENCOUNTER — OFFICE VISIT (OUTPATIENT)
Dept: DIABETES | Facility: CLINIC | Age: 47
End: 2022-12-13
Payer: MEDICAID

## 2022-12-13 VITALS
HEIGHT: 72 IN | DIASTOLIC BLOOD PRESSURE: 82 MMHG | HEART RATE: 79 BPM | WEIGHT: 225 LBS | SYSTOLIC BLOOD PRESSURE: 138 MMHG | OXYGEN SATURATION: 98 % | BODY MASS INDEX: 30.48 KG/M2

## 2022-12-13 DIAGNOSIS — E89.0 POSTOPERATIVE HYPOTHYROIDISM: ICD-10-CM

## 2022-12-13 DIAGNOSIS — E11.42 TYPE 2 DIABETES MELLITUS WITH DIABETIC POLYNEUROPATHY, WITH LONG-TERM CURRENT USE OF INSULIN: ICD-10-CM

## 2022-12-13 DIAGNOSIS — Z79.4 TYPE 2 DIABETES MELLITUS WITH DIABETIC POLYNEUROPATHY, WITH LONG-TERM CURRENT USE OF INSULIN: ICD-10-CM

## 2022-12-13 DIAGNOSIS — Z79.4 TYPE 2 DIABETES MELLITUS WITH HYPERGLYCEMIA, WITH LONG-TERM CURRENT USE OF INSULIN: ICD-10-CM

## 2022-12-13 DIAGNOSIS — E11.65 UNCONTROLLED TYPE 2 DIABETES MELLITUS WITH HYPERGLYCEMIA: Chronic | ICD-10-CM

## 2022-12-13 DIAGNOSIS — Z91.148 NONCOMPLIANCE W/MEDICATION TREATMENT DUE TO INTERMIT USE OF MEDICATION: ICD-10-CM

## 2022-12-13 DIAGNOSIS — E13.9 LADA (LATENT AUTOIMMUNE DIABETES MELLITUS IN ADULTS): Primary | ICD-10-CM

## 2022-12-13 DIAGNOSIS — Z71.9 HEALTH EDUCATION/COUNSELING: ICD-10-CM

## 2022-12-13 DIAGNOSIS — E89.0 HISTORY OF THYROIDECTOMY, TOTAL: ICD-10-CM

## 2022-12-13 DIAGNOSIS — E66.09 CLASS 1 OBESITY DUE TO EXCESS CALORIES WITH SERIOUS COMORBIDITY AND BODY MASS INDEX (BMI) OF 30.0 TO 30.9 IN ADULT: ICD-10-CM

## 2022-12-13 DIAGNOSIS — Z91.199 NONCOMPLIANCE WITH DIABETES TREATMENT: ICD-10-CM

## 2022-12-13 DIAGNOSIS — I10 PRIMARY HYPERTENSION: Chronic | ICD-10-CM

## 2022-12-13 DIAGNOSIS — E11.65 TYPE 2 DIABETES MELLITUS WITH HYPERGLYCEMIA, WITH LONG-TERM CURRENT USE OF INSULIN: ICD-10-CM

## 2022-12-13 DIAGNOSIS — E78.00 PURE HYPERCHOLESTEROLEMIA: Chronic | ICD-10-CM

## 2022-12-13 PROBLEM — E66.811 CLASS 1 OBESITY DUE TO EXCESS CALORIES WITH SERIOUS COMORBIDITY AND BODY MASS INDEX (BMI) OF 30.0 TO 30.9 IN ADULT: Status: ACTIVE | Noted: 2022-02-21

## 2022-12-13 PROCEDURE — 1160F RVW MEDS BY RX/DR IN RCRD: CPT | Mod: CPTII,,, | Performed by: NURSE PRACTITIONER

## 2022-12-13 PROCEDURE — 3061F NEG MICROALBUMINURIA REV: CPT | Mod: CPTII,,, | Performed by: NURSE PRACTITIONER

## 2022-12-13 PROCEDURE — 99214 OFFICE O/P EST MOD 30 MIN: CPT | Mod: S$PBB,,, | Performed by: NURSE PRACTITIONER

## 2022-12-13 PROCEDURE — 99999 PR PBB SHADOW E&M-EST. PATIENT-LVL V: CPT | Mod: PBBFAC,,, | Performed by: NURSE PRACTITIONER

## 2022-12-13 PROCEDURE — 3075F SYST BP GE 130 - 139MM HG: CPT | Mod: CPTII,,, | Performed by: NURSE PRACTITIONER

## 2022-12-13 PROCEDURE — 1160F PR REVIEW ALL MEDS BY PRESCRIBER/CLIN PHARMACIST DOCUMENTED: ICD-10-PCS | Mod: CPTII,,, | Performed by: NURSE PRACTITIONER

## 2022-12-13 PROCEDURE — 1159F MED LIST DOCD IN RCRD: CPT | Mod: CPTII,,, | Performed by: NURSE PRACTITIONER

## 2022-12-13 PROCEDURE — 3044F PR MOST RECENT HEMOGLOBIN A1C LEVEL <7.0%: ICD-10-PCS | Mod: CPTII,,, | Performed by: NURSE PRACTITIONER

## 2022-12-13 PROCEDURE — 3079F PR MOST RECENT DIASTOLIC BLOOD PRESSURE 80-89 MM HG: ICD-10-PCS | Mod: CPTII,,, | Performed by: NURSE PRACTITIONER

## 2022-12-13 PROCEDURE — 3044F HG A1C LEVEL LT 7.0%: CPT | Mod: CPTII,,, | Performed by: NURSE PRACTITIONER

## 2022-12-13 PROCEDURE — 1159F PR MEDICATION LIST DOCUMENTED IN MEDICAL RECORD: ICD-10-PCS | Mod: CPTII,,, | Performed by: NURSE PRACTITIONER

## 2022-12-13 PROCEDURE — 95251 PR GLUCOSE MONITOR, 72 HOUR, PHYS INTERP: ICD-10-PCS | Mod: ,,, | Performed by: NURSE PRACTITIONER

## 2022-12-13 PROCEDURE — 3066F PR DOCUMENTATION OF TREATMENT FOR NEPHROPATHY: ICD-10-PCS | Mod: CPTII,,, | Performed by: NURSE PRACTITIONER

## 2022-12-13 PROCEDURE — 3066F NEPHROPATHY DOC TX: CPT | Mod: CPTII,,, | Performed by: NURSE PRACTITIONER

## 2022-12-13 PROCEDURE — 3008F BODY MASS INDEX DOCD: CPT | Mod: CPTII,,, | Performed by: NURSE PRACTITIONER

## 2022-12-13 PROCEDURE — 3075F PR MOST RECENT SYSTOLIC BLOOD PRESS GE 130-139MM HG: ICD-10-PCS | Mod: CPTII,,, | Performed by: NURSE PRACTITIONER

## 2022-12-13 PROCEDURE — 4010F PR ACE/ARB THEARPY RXD/TAKEN: ICD-10-PCS | Mod: CPTII,,, | Performed by: NURSE PRACTITIONER

## 2022-12-13 PROCEDURE — 95251 CONT GLUC MNTR ANALYSIS I&R: CPT | Mod: ,,, | Performed by: NURSE PRACTITIONER

## 2022-12-13 PROCEDURE — 4010F ACE/ARB THERAPY RXD/TAKEN: CPT | Mod: CPTII,,, | Performed by: NURSE PRACTITIONER

## 2022-12-13 PROCEDURE — 3079F DIAST BP 80-89 MM HG: CPT | Mod: CPTII,,, | Performed by: NURSE PRACTITIONER

## 2022-12-13 PROCEDURE — 99215 OFFICE O/P EST HI 40 MIN: CPT | Mod: PBBFAC,PN | Performed by: NURSE PRACTITIONER

## 2022-12-13 PROCEDURE — 3008F PR BODY MASS INDEX (BMI) DOCUMENTED: ICD-10-PCS | Mod: CPTII,,, | Performed by: NURSE PRACTITIONER

## 2022-12-13 PROCEDURE — 99214 PR OFFICE/OUTPT VISIT, EST, LEVL IV, 30-39 MIN: ICD-10-PCS | Mod: S$PBB,,, | Performed by: NURSE PRACTITIONER

## 2022-12-13 PROCEDURE — 99999 PR PBB SHADOW E&M-EST. PATIENT-LVL V: ICD-10-PCS | Mod: PBBFAC,,, | Performed by: NURSE PRACTITIONER

## 2022-12-13 PROCEDURE — 3061F PR NEG MICROALBUMINURIA RESULT DOCUMENTED/REVIEW: ICD-10-PCS | Mod: CPTII,,, | Performed by: NURSE PRACTITIONER

## 2022-12-13 RX ORDER — PEN NEEDLE, DIABETIC 32GX 5/32"
NEEDLE, DISPOSABLE MISCELLANEOUS
Qty: 150 EACH | Refills: 11 | Status: SHIPPED | OUTPATIENT
Start: 2022-12-13

## 2022-12-13 RX ORDER — SEMAGLUTIDE 1.34 MG/ML
0.5 INJECTION, SOLUTION SUBCUTANEOUS
Qty: 1 PEN | Refills: 6 | Status: SHIPPED | OUTPATIENT
Start: 2022-12-13 | End: 2023-03-13 | Stop reason: SINTOL

## 2022-12-13 RX ORDER — INSULIN ASPART 100 [IU]/ML
INJECTION, SOLUTION INTRAVENOUS; SUBCUTANEOUS
Qty: 9 ML | Refills: 6 | Status: SHIPPED | OUTPATIENT
Start: 2022-12-13 | End: 2023-03-13 | Stop reason: SDUPTHER

## 2022-12-13 RX ORDER — INSULIN GLARGINE 100 [IU]/ML
15 INJECTION, SOLUTION SUBCUTANEOUS NIGHTLY
Qty: 4.5 ML | Refills: 3 | Status: SHIPPED | OUTPATIENT
Start: 2022-12-13 | End: 2023-03-13 | Stop reason: SDUPTHER

## 2022-12-13 NOTE — PATIENT INSTRUCTIONS
Continue Lantus 15 units nightly     Stop the scheduled Novolog   Use Novolog sliding scale as needed   With using correction scale:: ONLY ON A BEFORE MEAL BLOOD SUGAR READING ELEVATION   150-200: +2 units  201-250: +4 units  251-300: +6 units  301-350: +8 units  >350: +10 units    Start Ozempic 0.25 mg weekly for 4 weeks & then 0.5 mg weekly thereafter.  Injected into the abdomen only   Injected ONLY ONCE A WEEK   Please notify me for any abdominal pain, nausea, vomiting, diarrhea, constipation, acid reflux       Let me know if your blood sugars are running under 100 and we will cut back on insulin

## 2022-12-13 NOTE — ASSESSMENT & PLAN NOTE
+ TOM   c-peptide level WNL X2 now   Can treat as T2DM   Discussed trying to titrate back/ come off insulin and use GLP1 or SGLT2   She is open to trying GLP1 today   Dexcom is helping with self awareness and self accountability    Uncontrolled   BG readings above goal on dexcom   Missing doses of Novolog       -- Medication Changes:   Continue Lantus 15 units nightly     Stop the scheduled Novolog   Use Novolog sliding scale as needed   With using correction scale:: ONLY ON A BEFORE MEAL BLOOD SUGAR READING ELEVATION   150-200: +2 units  201-250: +4 units  251-300: +6 units  301-350: +8 units  >350: +10 units    Start Ozempic 0.25 mg weekly for 4 weeks & then 0.5 mg weekly thereafter.  Injected into the abdomen only   Injected ONLY ONCE A WEEK   Please notify me for any abdominal pain, nausea, vomiting, diarrhea, constipation, acid reflux     Continue to use the dexcom        -- Reviewed goals of therapy are to get the best control we can without hypoglycemia.  -- Reviewed patient's current insulin regimen. Clarified proper insulin dose and timing in relation to meals, etc. Insulin injection sites and proper rotation instructed.    -- Advised frequent self blood glucose monitoring.  Patient encouraged to document glucose results and bring them to every clinic visit.  Continue to use the dexcom personal CGM   -- Hypoglycemia precautions discussed. Instructed on precautions before driving.    -- Call for Bg repeatedly < 70 or > 180.   -- Close adherence to lifestyle changes recommended.   -- Periodic follow ups for eye evaluations, foot care and dental care suggested.        Patient has diabetes mellitus and manages diabetes with intensive insulin regimen and uses prandial and basal insulin daily  Patient requires a therapeutic CGM and is willing to use therapeutic CGM for the necessary frequent adjustments of insulin therapy.  I have completed an in-person visit during the previous 6 months and will continue to have  in-person visits every 6 months to assess adherence to their CGM regimen and diabetes treatment plan.  Due to COVID pandemic and need for remote monitoring this tool is medically necessary

## 2022-12-14 NOTE — ASSESSMENT & PLAN NOTE
On LT4   Reports cysts that were removed were  Benign  Evaluated by General Endocrine in August of 2022-- Dr. NAHUN Santos

## 2022-12-14 NOTE — ASSESSMENT & PLAN NOTE
Body mass index is 30.52 kg/m².  Increases insulin resistance.   Discussed DM diet and exercise.

## 2022-12-14 NOTE — ASSESSMENT & PLAN NOTE
TSH WNL, on LT4 150 mcg, reinforced take by itself on empty stomach, first thing in the morning and wait at least 30-60 minutes to eat, drink, or take other medications.      Seen by General Endocrine in August of 2022-- Dr. Kumar

## 2022-12-16 ENCOUNTER — TELEPHONE (OUTPATIENT)
Dept: DIABETES | Facility: CLINIC | Age: 47
End: 2022-12-16
Payer: MEDICAID

## 2022-12-19 ENCOUNTER — TELEPHONE (OUTPATIENT)
Dept: ORTHOPEDICS | Facility: CLINIC | Age: 47
End: 2022-12-19
Payer: MEDICAID

## 2022-12-19 DIAGNOSIS — M25.562 CHRONIC PAIN OF LEFT KNEE: Primary | ICD-10-CM

## 2022-12-19 DIAGNOSIS — G89.29 CHRONIC PAIN OF LEFT KNEE: Primary | ICD-10-CM

## 2022-12-19 NOTE — TELEPHONE ENCOUNTER
Spoke with patient via phone.  Discussed she will need to start physical therapy within the week after her surgery. DOS 1/9/2023.  She would like to do it at [facility].  Order placed.

## 2023-01-01 NOTE — PATIENT INSTRUCTIONS
For diabetes:     Continue dexcom, insulins for now.    Basal Insulin  Take 16 units of lantus insulin.    Check your glucose in the morning before breakfast and keep a log.  Goal AM glucose:       After 3-5 days, if your average glucose is above 140, increase your dose by 2 units  If your AM glucose is under 90, decrease by 2 units.       Meal-time insulin    Take 5 units of novolog with each meal (works best when taken about 5-10 minutes before you eat). If you skip a meal, skip this meal-time insulin.    Continue adjustments based on the sugar levels.    
(2) good, crying

## 2023-01-04 ENCOUNTER — TELEPHONE (OUTPATIENT)
Dept: ORTHOPEDICS | Facility: CLINIC | Age: 48
End: 2023-01-04
Payer: MEDICAID

## 2023-01-04 ENCOUNTER — OFFICE VISIT (OUTPATIENT)
Dept: ORTHOPEDICS | Facility: CLINIC | Age: 48
End: 2023-01-04
Payer: MEDICAID

## 2023-01-04 VITALS
SYSTOLIC BLOOD PRESSURE: 112 MMHG | HEART RATE: 84 BPM | DIASTOLIC BLOOD PRESSURE: 76 MMHG | BODY MASS INDEX: 30.25 KG/M2 | WEIGHT: 223.31 LBS | HEIGHT: 72 IN

## 2023-01-04 DIAGNOSIS — M71.22 BAKER'S CYST OF KNEE, LEFT: ICD-10-CM

## 2023-01-04 DIAGNOSIS — M25.562 CHRONIC PAIN OF LEFT KNEE: Primary | ICD-10-CM

## 2023-01-04 DIAGNOSIS — G89.29 CHRONIC PAIN OF LEFT KNEE: Primary | ICD-10-CM

## 2023-01-04 PROCEDURE — 99999 PR PBB SHADOW E&M-EST. PATIENT-LVL IV: CPT | Mod: PBBFAC,,,

## 2023-01-04 PROCEDURE — 3074F SYST BP LT 130 MM HG: CPT | Mod: CPTII,,,

## 2023-01-04 PROCEDURE — 3008F PR BODY MASS INDEX (BMI) DOCUMENTED: ICD-10-PCS | Mod: CPTII,,,

## 2023-01-04 PROCEDURE — 1159F MED LIST DOCD IN RCRD: CPT | Mod: CPTII,,,

## 2023-01-04 PROCEDURE — 3008F BODY MASS INDEX DOCD: CPT | Mod: CPTII,,,

## 2023-01-04 PROCEDURE — 3078F DIAST BP <80 MM HG: CPT | Mod: CPTII,,,

## 2023-01-04 PROCEDURE — 99214 PR OFFICE/OUTPT VISIT, EST, LEVL IV, 30-39 MIN: ICD-10-PCS | Mod: S$PBB,,,

## 2023-01-04 PROCEDURE — 99214 OFFICE O/P EST MOD 30 MIN: CPT | Mod: PBBFAC,PN

## 2023-01-04 PROCEDURE — 99214 OFFICE O/P EST MOD 30 MIN: CPT | Mod: S$PBB,,,

## 2023-01-04 PROCEDURE — 3078F PR MOST RECENT DIASTOLIC BLOOD PRESSURE < 80 MM HG: ICD-10-PCS | Mod: CPTII,,,

## 2023-01-04 PROCEDURE — 3074F PR MOST RECENT SYSTOLIC BLOOD PRESSURE < 130 MM HG: ICD-10-PCS | Mod: CPTII,,,

## 2023-01-04 PROCEDURE — 1159F PR MEDICATION LIST DOCUMENTED IN MEDICAL RECORD: ICD-10-PCS | Mod: CPTII,,,

## 2023-01-04 PROCEDURE — 99999 PR PBB SHADOW E&M-EST. PATIENT-LVL IV: ICD-10-PCS | Mod: PBBFAC,,,

## 2023-01-04 NOTE — PROGRESS NOTES
Patient ID: Javier Enciso is a 47 y.o. female    Pre-op Exam of the Left Knee      History of Present Illness:    Javier Enciso presents to clinic for left knee pain Patient denies known YAQUELIN. The pain started 1  year  ago and is becoming progressively worse.  Pain is located over (points to) posterior left knee. She reports that the pain is a 7 /10 pulling pain today. The pain is affecting ADLs and limiting desired level of activity. Denies numbness, tingling, radiation and inability to bear weight.  Pain is 8 /10 at its worst. Reports persistent clicking, locking and instability subjectively after a fall 1 year ago.     Trail of  voltaren gel and intermittent ibuprofen  with no improvement.     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  At our last visit we did a steroid injection of her left knee.  She reports relief for about 3-5 days and then her pain returned.  Still complaining of tightness and pain and buckling sensation in the knee.    ____________________________________________________________________    Interval history 01/04/2023 : Patient returns today for pre-op right knee arthroscopy on 1/9/2023.       Lab Results   Component Value Date    HGBA1C 6.9 (H) 10/05/2022       PAST MEDICAL HISTORY:   Past Medical History:   Diagnosis Date    Allergies     Diabetes mellitus, type 2     High cholesterol     Hypertension     Thyroid disease      PAST SURGICAL HISTORY:   Past Surgical History:   Procedure Laterality Date    BREAST CYST ASPIRATION Left 1996    BREAST CYST ASPIRATION Right 2001    ESOPHAGOGASTRODUODENOSCOPY N/A 10/26/2022    Procedure: EGD (ESOPHAGOGASTRODUODENOSCOPY);  Surgeon: Dale Bray MD;  Location: [facility];  Service: Endoscopy;  Laterality: N/A;    HYSTERECTOMY      L WRIST SURGERY Left      "THYROID SURGERY       FAMILY HISTORY:   Family History   Problem Relation Age of Onset    Diabetes Mother     Diabetes Father     Breast cancer Maternal Aunt      SOCIAL HISTORY:   Social History     Occupational History    Not on file   Tobacco Use    Smoking status: Never    Smokeless tobacco: Never   Substance and Sexual Activity    Alcohol use: Yes     Comment: rare    Drug use: Never    Sexual activity: Yes        MEDICATIONS:   Current Outpatient Medications:     ALPRAZolam (XANAX) 0.25 MG tablet, Take 1 tablet (0.25 mg total) by mouth nightly as needed for Anxiety., Disp: 30 tablet, Rfl: 3    amLODIPine (NORVASC) 10 MG tablet, Take 1 tablet (10 mg total) by mouth once daily., Disp: 30 tablet, Rfl: 3    BD VARUN 2ND GEN PEN NEEDLE 32 gauge x 5/32" Ndle, To use with insulin injections 5 times per day, Disp: 150 each, Rfl: 11    butalbital-acetaminophen-caffeine -40 mg (FIORICET, ESGIC) -40 mg per tablet, Take 1 tablet by mouth every 8 (eight) hours as needed for Headaches (every 8 hours prn)., Disp: 30 tablet, Rfl: 3    cetirizine (ZYRTEC) 10 MG tablet, Take 1 tablet (10 mg total) by mouth nightly., Disp: 30 tablet, Rfl: 3    diclofenac sodium (SOLARAZE) 3 % gel, AAA TID PRN, Disp: 100 g, Rfl: 3    docusate sodium (COLACE) 100 MG capsule, Take 1 capsule (100 mg total) by mouth 2 (two) times daily., Disp: 60 capsule, Rfl: 3    ergocalciferol (ERGOCALCIFEROL) 50,000 unit Cap, Take 1 capsule (50,000 Units total) by mouth every 7 days., Disp: 4 capsule, Rfl: 3    insulin (LANTUS SOLOSTAR U-100 INSULIN) glargine 100 units/mL SubQ pen, Inject 15 Units into the skin every evening. Titrate up to fasting blood sugar less than 130.Max total daily dose of 30 units., Disp: 4.5 mL, Rfl: 3    insulin aspart U-100 (NOVOLOG FLEXPEN U-100 INSULIN) 100 unit/mL (3 mL) InPn pen, Sliding scale TID AC PRN. Max TDD Of 30 units, Disp: 9 mL, Rfl: 6    levothyroxine (SYNTHROID) 150 MCG tablet, Take 1 tablet (150 mcg total) by " mouth every morning., Disp: 30 tablet, Rfl: 3    losartan (COZAAR) 50 MG tablet, Take 1 tablet (50 mg total) by mouth once daily., Disp: 30 tablet, Rfl: 3    metoclopramide HCl (REGLAN) 5 mg/5 mL Soln, Take 5 mLs (5 mg total) by mouth 4 (four) times daily before meals and nightly., Disp: 600 mL, Rfl: 3    metoprolol succinate (TOPROL-XL) 50 MG 24 hr tablet, Take 1 tablet (50 mg total) by mouth once daily., Disp: 30 tablet, Rfl: 3    montelukast (SINGULAIR) 10 mg tablet, Take 1 tablet (10 mg total) by mouth once daily., Disp: 30 tablet, Rfl: 3    nortriptyline (PAMELOR) 10 MG capsule, Take 1 capsule (10 mg total) by mouth every evening., Disp: 30 capsule, Rfl: 3    polyethylene glycol (GLYCOLAX) 17 gram PwPk, Take 17 g by mouth once daily., Disp: 60 packet, Rfl: 3    rosuvastatin (CRESTOR) 40 MG Tab, Take 1 tablet (40 mg total) by mouth every evening., Disp: 30 tablet, Rfl: 3    semaglutide (OZEMPIC) 0.25 mg or 0.5 mg(2 mg/1.5 mL) pen injector, Inject 0.5 mg into the skin every 7 days., Disp: 1 pen, Rfl: 6    XHANCE 93 mcg/actuation AerB, USE 1 SPRAY IN EACH NOSTRIL TWICE DAILY AS DIRECTED, Disp: 16 mL, Rfl: 3    esomeprazole (NEXIUM) 40 MG capsule, Take 1 capsule (40 mg total) by mouth before breakfast., Disp: 30 capsule, Rfl: 3    glucagon (BAQSIMI) 3 mg/actuation Spry, 3 mg (one actuation) into a single nostril; if no response, may repeat in 15 minutes using a new intranasal device. (Patient not taking: Reported on 12/13/2022), Disp: 2 each, Rfl: 1  ALLERGIES:   Review of patient's allergies indicates:   Allergen Reactions    Hydrocodone Itching    Hydrocodone-acetaminophen      Itchy (skin)^         Physical Exam     Vitals:    01/04/23 1425   BP: 112/76   Pulse: 84     Alert and oriented to person, place and time. No acute distress. Well-groomed, not ill appearing. Pupils round and reactive, normal respiratory effort, no audible wheezing.     OBSERVATION / INSPECTION   Gait:   Nonantalgic   Alignment:  Neutral    Scars:   None   Muscle atrophy: None  Effusion:  None   Warmth:  None   Discoloration:   None     TENDERNESS                 Patella     Negative    Peripatellar medial    Negative   Peripatellar lateral   Negative   Patellar tendon   Negative   Quad tendon     Negative    Prepatellar Bursa   Negative     Tibial tubercle    Negative    Pes anserine/HS   positive      ITB     Negative      LCL     Negative  MFC     Negative  LFC     Negative  MCL      Negative  Medial Joint Line    POS left   Lateral Joint Line   POS left  Quadriceps    Negative  Hamstring     POS left            Range of  Motion:        Left knee:   5-130°     Patellofemoral examination:     Patella position    Centered  Crepitus (PF):    Absent   Lateral tilt:    Normal   J-sign:     None   Patellofemoral grind:   No pain       MENISCAL SIGNS:     Pain on terminal extension:  +  Pain on terminal flexion:  +  Александрs maneuver:  + medial and lateral      LIGAMENT EXAMINATION:  ACL / Lachman:  normal   PCL-Post.  drawer: normal 0 to 2mm  MCL- Valgus:  normal 0 to 2mm  LCL- Varus:    normal 0 to 2mm    Dial Test: difference c/w other side  At 30° flexion: normal (< 5°)   At 90° flexion: normal (< 5°)       STRENGTH: (* = with pain)   Quadricep   5/5  Hamstrin/5    EXTREMITY NEURO-VASCULAR EXAMINATION:   Sensation:  Grossly intact to light touch all dermatomal regions.   Motor Function:  Fully intact motor function at hip, knee, foot and ankle    DTRs;  quadriceps and  achilles 2+.  No clonus and downgoing Babinski.    Vascular status:  DP and PT pulses 2+, brisk capillary refill, symmetric.     Imaging:     left Knee X-rays ordered/reviewed by me showing no evidence of fracture or dislocation. There is no obvious malalignment. No evidence of masses, lesions or foreign bodies. Mild medial femorotibial and lateral patellofemoral joint space narrowing.     Left knee MRI reviewed showing parameniscal cyst no evidence of marcelo tear in the  meniscus.  ACL intact.    Assessment & Plan    Chronic pain of left knee    Baker's cyst of knee, left        1. Imaging results reviewed today and discussed with Tim Soto MD. We reviewed with Javier Enciso today, the pathology and natural history of her diagnosis. We have discussed a variety of treatment options including medications, physical therapy and other alternative treatments. I also explained the indications, risks and benefits of surgery. After discussion, she decided to proceed with surgery. The decision was made to go forward with:  Left knee diagnostic arthroscopy with possible meniscus debridement versus repair  Left knee arthroscopy with possible ganglion cyst decompression  All other indicated procedures     The details of the surgical procedure were explained, including the location of probable incisions and a description of likely hardware and/or grafts to be used. The patient understands the likely convalescence after surgery. Also, we have thoroughly discussed the risks, benefits and alternatives to surgery, including, but not limited to, the risk of infection, joint stiffness, blood clot (including DVT and/or pulmonary embolus), neurologic and vascular injury.  It was explained that, if tissue has been repaired or reconstructed, there is a chance of failure, which may require further management.    All of the patient's questions were answered and the patient will contact us if they have any questions or concerns in the interim.    Post-Op Medications to be prescribed:   Percocet 5/325mg Take 1-2 tablets every 4-6 hours PRN pain #28  Zofran 4mg oral disintegrating tablets every 8 hours PRN nausea/vomiting   Motrin 600 mg TID PRN   ASA 81mg twice daily x 2 weeks     Medical Clearance: no  Hx of DVT,PE, anesthetic complications: no     Additional notes/concerns:  sleep apnea dx 2021     Opioid Disclosure  Today we discussed the reasons why the prescription is necessary as well as: the  risks of addiction and overdose associated with opioid drugs and the dangers of taking opioid drugs with alcohol, benzodiazepines and other central nervous system depressants; alternative treatments that may be available; the risks associated with the use of the drugs being prescribed, specifically that opioids are highly addictive, even when taken as prescribed, that there is a risk of developing a physical or psychological dependence on the controlled dangerous substance, and that the risks of taking more opioids than prescribed or mixing sedatives, benzodiazepines or alcohol with opioids can result in fatal respiratory depression.

## 2023-01-04 NOTE — TELEPHONE ENCOUNTER
Pt want to email me her FMLA because she has to have it turned in by 01/06/2023. I gave her my email address and explain to her that we will try to have it ready for her. Pt v/u

## 2023-01-04 NOTE — H&P
Patient ID: Javier Enciso is a 47 y.o. female    Pre-op Exam of the Left Knee      History of Present Illness:    Javier Enciso presents to clinic for left knee pain Patient denies known YAQUELIN. The pain started 1 year ago and is becoming progressively worse.  Pain is located over (points to) posterior left knee. She reports that the pain is a 7 /10 pulling pain today. The pain is affecting ADLs and limiting desired level of activity. Denies numbness, tingling, radiation and inability to bear weight.  Pain is 8 /10 at its worst. Reports persistent clicking, locking and instability subjectively after a fall 1 year ago.     Trail of voltaren gel and intermittent ibuprofen with no improvement.     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  At our last visit we did a steroid injection of her left knee.  She reports relief for about 3-5 days and then her pain returned.  Still complaining of tightness and pain and buckling sensation in the knee.    ____________________________________________________________________    Interval history 01/04/2023 : Patient returns today for pre-op right knee arthroscopy on 1/9/2023.       Lab Results   Component Value Date    HGBA1C 6.9 (H) 10/05/2022       PAST MEDICAL HISTORY:   Past Medical History:   Diagnosis Date    Allergies     Diabetes mellitus, type 2     High cholesterol     Hypertension     Thyroid disease      PAST SURGICAL HISTORY:   Past Surgical History:   Procedure Laterality Date    BREAST CYST ASPIRATION Left 1996    BREAST CYST ASPIRATION Right 2001    ESOPHAGOGASTRODUODENOSCOPY N/A 10/26/2022    Procedure: EGD (ESOPHAGOGASTRODUODENOSCOPY);  Surgeon: Dale Bray MD;  Location: [facility];  Service: Endoscopy;  Laterality: N/A;    HYSTERECTOMY      L WRIST SURGERY Left      "THYROID SURGERY       FAMILY HISTORY:   Family History   Problem Relation Age of Onset    Diabetes Mother     Diabetes Father     Breast cancer Maternal Aunt      SOCIAL HISTORY:   Social History     Occupational History    Not on file   Tobacco Use    Smoking status: Never    Smokeless tobacco: Never   Substance and Sexual Activity    Alcohol use: Yes     Comment: rare    Drug use: Never    Sexual activity: Yes        MEDICATIONS:   Current Outpatient Medications:     ALPRAZolam (XANAX) 0.25 MG tablet, Take 1 tablet (0.25 mg total) by mouth nightly as needed for Anxiety., Disp: 30 tablet, Rfl: 3    amLODIPine (NORVASC) 10 MG tablet, Take 1 tablet (10 mg total) by mouth once daily., Disp: 30 tablet, Rfl: 3    BD VARUN 2ND GEN PEN NEEDLE 32 gauge x 5/32" Ndle, To use with insulin injections 5 times per day, Disp: 150 each, Rfl: 11    butalbital-acetaminophen-caffeine -40 mg (FIORICET, ESGIC) -40 mg per tablet, Take 1 tablet by mouth every 8 (eight) hours as needed for Headaches (every 8 hours prn)., Disp: 30 tablet, Rfl: 3    cetirizine (ZYRTEC) 10 MG tablet, Take 1 tablet (10 mg total) by mouth nightly., Disp: 30 tablet, Rfl: 3    diclofenac sodium (SOLARAZE) 3 % gel, AAA TID PRN, Disp: 100 g, Rfl: 3    docusate sodium (COLACE) 100 MG capsule, Take 1 capsule (100 mg total) by mouth 2 (two) times daily., Disp: 60 capsule, Rfl: 3    ergocalciferol (ERGOCALCIFEROL) 50,000 unit Cap, Take 1 capsule (50,000 Units total) by mouth every 7 days., Disp: 4 capsule, Rfl: 3    insulin (LANTUS SOLOSTAR U-100 INSULIN) glargine 100 units/mL SubQ pen, Inject 15 Units into the skin every evening. Titrate up to fasting blood sugar less than 130.Max total daily dose of 30 units., Disp: 4.5 mL, Rfl: 3    insulin aspart U-100 (NOVOLOG FLEXPEN U-100 INSULIN) 100 unit/mL (3 mL) InPn pen, Sliding scale TID AC PRN. Max TDD Of 30 units, Disp: 9 mL, Rfl: 6    levothyroxine (SYNTHROID) 150 MCG tablet, Take 1 tablet (150 mcg total) by " mouth every morning., Disp: 30 tablet, Rfl: 3    losartan (COZAAR) 50 MG tablet, Take 1 tablet (50 mg total) by mouth once daily., Disp: 30 tablet, Rfl: 3    metoclopramide HCl (REGLAN) 5 mg/5 mL Soln, Take 5 mLs (5 mg total) by mouth 4 (four) times daily before meals and nightly., Disp: 600 mL, Rfl: 3    metoprolol succinate (TOPROL-XL) 50 MG 24 hr tablet, Take 1 tablet (50 mg total) by mouth once daily., Disp: 30 tablet, Rfl: 3    montelukast (SINGULAIR) 10 mg tablet, Take 1 tablet (10 mg total) by mouth once daily., Disp: 30 tablet, Rfl: 3    nortriptyline (PAMELOR) 10 MG capsule, Take 1 capsule (10 mg total) by mouth every evening., Disp: 30 capsule, Rfl: 3    polyethylene glycol (GLYCOLAX) 17 gram PwPk, Take 17 g by mouth once daily., Disp: 60 packet, Rfl: 3    rosuvastatin (CRESTOR) 40 MG Tab, Take 1 tablet (40 mg total) by mouth every evening., Disp: 30 tablet, Rfl: 3    semaglutide (OZEMPIC) 0.25 mg or 0.5 mg(2 mg/1.5 mL) pen injector, Inject 0.5 mg into the skin every 7 days., Disp: 1 pen, Rfl: 6    XHANCE 93 mcg/actuation AerB, USE 1 SPRAY IN EACH NOSTRIL TWICE DAILY AS DIRECTED, Disp: 16 mL, Rfl: 3    esomeprazole (NEXIUM) 40 MG capsule, Take 1 capsule (40 mg total) by mouth before breakfast., Disp: 30 capsule, Rfl: 3    glucagon (BAQSIMI) 3 mg/actuation Spry, 3 mg (one actuation) into a single nostril; if no response, may repeat in 15 minutes using a new intranasal device. (Patient not taking: Reported on 12/13/2022), Disp: 2 each, Rfl: 1  ALLERGIES:   Review of patient's allergies indicates:   Allergen Reactions    Hydrocodone Itching    Hydrocodone-acetaminophen      Itchy (skin)^         Physical Exam     Vitals:    01/04/23 1425   BP: 112/76   Pulse: 84     Alert and oriented to person, place and time. No acute distress. Well-groomed, not ill appearing. Pupils round and reactive, normal respiratory effort, no audible wheezing.     OBSERVATION / INSPECTION   Gait:   Nonantalgic   Alignment:  Neutral    Scars:   None   Muscle atrophy: None  Effusion:  None   Warmth:  None   Discoloration:   None     TENDERNESS                 Patella     Negative    Peripatellar medial    Negative   Peripatellar lateral   Negative   Patellar tendon   Negative   Quad tendon     Negative    Prepatellar Bursa   Negative     Tibial tubercle    Negative    Pes anserine/HS   positive      ITB     Negative      LCL     Negative  MFC     Negative  LFC     Negative  MCL      Negative  Medial Joint Line    POS left   Lateral Joint Line   POS left  Quadriceps    Negative  Hamstring     POS left            Range of  Motion:        Left knee:   5-130°     Patellofemoral examination:     Patella position    Centered  Crepitus (PF):    Absent   Lateral tilt:    Normal   J-sign:     None   Patellofemoral grind:   No pain       MENISCAL SIGNS:     Pain on terminal extension:  +  Pain on terminal flexion:  +  Александрs maneuver:  + medial and lateral      LIGAMENT EXAMINATION:  ACL / Lachman:  normal   PCL-Post.  drawer: normal 0 to 2mm  MCL- Valgus:  normal 0 to 2mm  LCL- Varus:    normal 0 to 2mm    Dial Test: difference c/w other side  At 30° flexion: normal (< 5°)   At 90° flexion: normal (< 5°)       STRENGTH: (* = with pain)   Quadricep   5/5  Hamstrin/5    EXTREMITY NEURO-VASCULAR EXAMINATION:   Sensation:  Grossly intact to light touch all dermatomal regions.   Motor Function:  Fully intact motor function at hip, knee, foot and ankle    DTRs;  quadriceps and  achilles 2+.  No clonus and downgoing Babinski.    Vascular status:  DP and PT pulses 2+, brisk capillary refill, symmetric.     Imaging:     left Knee X-rays ordered/reviewed by me showing no evidence of fracture or dislocation. There is no obvious malalignment. No evidence of masses, lesions or foreign bodies. Mild medial femorotibial and lateral patellofemoral joint space narrowing.     Left knee MRI reviewed showing parameniscal cyst no evidence of marcelo tear in the  meniscus.  ACL intact.    Assessment & Plan    Chronic pain of left knee    Baker's cyst of knee, left        1. Imaging results reviewed today and discussed with Tim Soto MD. We reviewed with Javier Enciso today, the pathology and natural history of her diagnosis. We have discussed a variety of treatment options including medications, physical therapy and other alternative treatments. I also explained the indications, risks and benefits of surgery. After discussion, she decided to proceed with surgery. The decision was made to go forward with:  Left knee diagnostic arthroscopy with possible meniscus debridement versus repair  Left knee arthroscopy with possible ganglion cyst decompression  All other indicated procedures     The details of the surgical procedure were explained, including the location of probable incisions and a description of likely hardware and/or grafts to be used. The patient understands the likely convalescence after surgery. Also, we have thoroughly discussed the risks, benefits and alternatives to surgery, including, but not limited to, the risk of infection, joint stiffness, blood clot (including DVT and/or pulmonary embolus), neurologic and vascular injury.  It was explained that, if tissue has been repaired or reconstructed, there is a chance of failure, which may require further management.    All of the patient's questions were answered and the patient will contact us if they have any questions or concerns in the interim.    Post-Op Medications to be prescribed:   Percocet 5/325mg Take 1-2 tablets every 4-6 hours PRN pain #28  Zofran 4mg oral disintegrating tablets every 8 hours PRN nausea/vomiting   Motrin 600 mg TID PRN   ASA 81mg twice daily x 2 weeks     Medical Clearance: no  Hx of DVT,PE, anesthetic complications: no     Additional notes/concerns:  sleep apnea dx 2021     Opioid Disclosure  Today we discussed the reasons why the prescription is necessary as well as: the  risks of addiction and overdose associated with opioid drugs and the dangers of taking opioid drugs with alcohol, benzodiazepines and other central nervous system depressants; alternative treatments that may be available; the risks associated with the use of the drugs being prescribed, specifically that opioids are highly addictive, even when taken as prescribed, that there is a risk of developing a physical or psychological dependence on the controlled dangerous substance, and that the risks of taking more opioids than prescribed or mixing sedatives, benzodiazepines or alcohol with opioids can result in fatal respiratory depression.

## 2023-01-04 NOTE — TELEPHONE ENCOUNTER
----- Message from Joseph Green MA sent at 1/4/2023 12:03 PM CST -----  Regarding: [facility] paper work  Contact: [phone]  Patient is calling in regards to [facility] paper work is due on the 6 th

## 2023-01-06 ENCOUNTER — TELEPHONE (OUTPATIENT)
Dept: ORTHOPEDICS | Facility: CLINIC | Age: 48
End: 2023-01-06
Payer: MEDICAID

## 2023-01-06 NOTE — TELEPHONE ENCOUNTER
----- Message from Anya Zhu sent at 1/5/2023 12:21 PM CST -----  Patient is calling to check status of paperwork. She stated that it's due tomorrow.  [phone]

## 2023-01-09 DIAGNOSIS — G89.18 POST-OP PAIN: ICD-10-CM

## 2023-01-09 DIAGNOSIS — G89.29 CHRONIC PAIN OF LEFT KNEE: Primary | ICD-10-CM

## 2023-01-09 DIAGNOSIS — M25.562 CHRONIC PAIN OF LEFT KNEE: Primary | ICD-10-CM

## 2023-01-09 RX ORDER — ONDANSETRON 4 MG/1
4 TABLET, ORALLY DISINTEGRATING ORAL 2 TIMES DAILY
Qty: 20 TABLET | Refills: 0 | Status: SHIPPED | OUTPATIENT
Start: 2023-01-09 | End: 2023-09-11

## 2023-01-09 RX ORDER — ASPIRIN 81 MG/1
81 TABLET ORAL 2 TIMES DAILY
Qty: 28 TABLET | Refills: 0 | Status: SHIPPED | OUTPATIENT
Start: 2023-01-09 | End: 2023-09-11

## 2023-01-09 RX ORDER — OXYCODONE AND ACETAMINOPHEN 7.5; 325 MG/1; MG/1
1 TABLET ORAL EVERY 4 HOURS PRN
Qty: 28 TABLET | Refills: 0 | Status: SHIPPED | OUTPATIENT
Start: 2023-01-09 | End: 2023-09-11

## 2023-01-09 RX ORDER — IBUPROFEN 600 MG/1
600 TABLET ORAL 3 TIMES DAILY
Qty: 60 TABLET | Refills: 1 | Status: SHIPPED | OUTPATIENT
Start: 2023-01-09

## 2023-01-10 ENCOUNTER — TELEPHONE (OUTPATIENT)
Dept: ORTHOPEDICS | Facility: CLINIC | Age: 48
End: 2023-01-10
Payer: MEDICAID

## 2023-01-10 NOTE — TELEPHONE ENCOUNTER
Received message about prior auth for aspirin. Spoke with patient via phone, states she got aspirin at pharmacy yesterday. No other concerns. Instructed patient to f.u with clinic of she needs anything else. Patient voiced understanding.

## 2023-01-12 PROBLEM — M25.662 DECREASED RANGE OF MOTION (ROM) OF LEFT KNEE: Status: ACTIVE | Noted: 2023-01-12

## 2023-01-12 PROBLEM — R29.898 WEAKNESS OF LEFT LOWER EXTREMITY: Status: ACTIVE | Noted: 2023-01-12

## 2023-01-12 PROBLEM — R26.89 ANTALGIC GAIT: Status: ACTIVE | Noted: 2023-01-12

## 2023-01-17 ENCOUNTER — TELEPHONE (OUTPATIENT)
Dept: DIABETES | Facility: CLINIC | Age: 48
End: 2023-01-17
Payer: MEDICAID

## 2023-01-17 ENCOUNTER — PATIENT MESSAGE (OUTPATIENT)
Dept: DIABETES | Facility: CLINIC | Age: 48
End: 2023-01-17
Payer: MEDICAID

## 2023-01-24 ENCOUNTER — OFFICE VISIT (OUTPATIENT)
Dept: ORTHOPEDICS | Facility: CLINIC | Age: 48
End: 2023-01-24
Payer: MEDICAID

## 2023-01-24 VITALS
SYSTOLIC BLOOD PRESSURE: 118 MMHG | HEART RATE: 86 BPM | HEIGHT: 72 IN | WEIGHT: 222.88 LBS | BODY MASS INDEX: 30.19 KG/M2 | DIASTOLIC BLOOD PRESSURE: 82 MMHG

## 2023-01-24 DIAGNOSIS — M25.9 KNEE JOINT DISORDER: Primary | ICD-10-CM

## 2023-01-24 PROCEDURE — 3074F PR MOST RECENT SYSTOLIC BLOOD PRESSURE < 130 MM HG: ICD-10-PCS | Mod: CPTII,,,

## 2023-01-24 PROCEDURE — 99024 PR POST-OP FOLLOW-UP VISIT: ICD-10-PCS | Mod: ,,,

## 2023-01-24 PROCEDURE — 3079F PR MOST RECENT DIASTOLIC BLOOD PRESSURE 80-89 MM HG: ICD-10-PCS | Mod: CPTII,,,

## 2023-01-24 PROCEDURE — 3008F PR BODY MASS INDEX (BMI) DOCUMENTED: ICD-10-PCS | Mod: CPTII,,,

## 2023-01-24 PROCEDURE — 99214 OFFICE O/P EST MOD 30 MIN: CPT | Mod: PBBFAC,PN

## 2023-01-24 PROCEDURE — 1159F MED LIST DOCD IN RCRD: CPT | Mod: CPTII,,,

## 2023-01-24 PROCEDURE — 1159F PR MEDICATION LIST DOCUMENTED IN MEDICAL RECORD: ICD-10-PCS | Mod: CPTII,,,

## 2023-01-24 PROCEDURE — 99024 POSTOP FOLLOW-UP VISIT: CPT | Mod: ,,,

## 2023-01-24 PROCEDURE — 99999 PR PBB SHADOW E&M-EST. PATIENT-LVL IV: CPT | Mod: PBBFAC,,,

## 2023-01-24 PROCEDURE — 3074F SYST BP LT 130 MM HG: CPT | Mod: CPTII,,,

## 2023-01-24 PROCEDURE — 3079F DIAST BP 80-89 MM HG: CPT | Mod: CPTII,,,

## 2023-01-24 PROCEDURE — 3008F BODY MASS INDEX DOCD: CPT | Mod: CPTII,,,

## 2023-01-24 PROCEDURE — 99999 PR PBB SHADOW E&M-EST. PATIENT-LVL IV: ICD-10-PCS | Mod: PBBFAC,,,

## 2023-01-24 RX ORDER — CYCLOBENZAPRINE HCL 5 MG
5 TABLET ORAL 3 TIMES DAILY PRN
Qty: 20 TABLET | Refills: 0 | Status: SHIPPED | OUTPATIENT
Start: 2023-01-24 | End: 2023-02-03

## 2023-01-24 NOTE — PROGRESS NOTES
Post-op Note    HPI    Javier Enciso is here 2 weeks s/p the following procedure:     1/9/2023  Arthroscopic left knee chondroplasty  Arthroscopic Plica excision left knee    Overall doing well. Pain controlled on current regimen. She is currently enrolled in Physical Therapy. Denies any chest pain or shortness of breathe. Denies any drainage from the incision. Denies any fevers, chills or paresthesias. States she may need a new therapy order.     DVT Prophylaxis: Aspirin, states she has a few days left    Physical Exam:     Patient is alert and oriented no acute distress.   Assistive Device: single crutch    Left knee: sutures removed Incision(s) are well healed.  There is no evidence of dehiscence.  There is no induration erythema or signs of infection.  Appropriate soft tissue swelling.  Compartments are soft and compressible.  Warm well-perfused extremity. ROM 15-60    Assessment    Javier Enciso is 2 weeks Post-op     Plan:    Overall doing as expected.  We discussed expectations of surgery and postoperative course.     Continued postoperative pain regimen -- Ibuprofen, Flexeril rx sent for post therapy muscle pain  DVT prophylaxis: cont ASA  Continue physical therapy (weight bearing status: WBAT), work on flexion and extension, new order placed   May shower without covering incisions, do not submerge knee for another 2 weeks    Follow-up: 4 weeks [place]  X-rays next visit: none

## 2023-02-20 ENCOUNTER — OFFICE VISIT (OUTPATIENT)
Dept: ORTHOPEDICS | Facility: CLINIC | Age: 48
End: 2023-02-20
Payer: MEDICAID

## 2023-02-20 VITALS
BODY MASS INDEX: 30.23 KG/M2 | HEIGHT: 72 IN | HEART RATE: 79 BPM | RESPIRATION RATE: 18 BRPM | SYSTOLIC BLOOD PRESSURE: 124 MMHG | WEIGHT: 223.19 LBS | DIASTOLIC BLOOD PRESSURE: 80 MMHG

## 2023-02-20 DIAGNOSIS — G89.29 CHRONIC PAIN OF LEFT KNEE: Primary | ICD-10-CM

## 2023-02-20 DIAGNOSIS — M25.562 CHRONIC PAIN OF LEFT KNEE: Primary | ICD-10-CM

## 2023-02-20 PROCEDURE — 99999 PR PBB SHADOW E&M-EST. PATIENT-LVL IV: ICD-10-PCS | Mod: PBBFAC,,, | Performed by: ORTHOPAEDIC SURGERY

## 2023-02-20 PROCEDURE — 4010F ACE/ARB THERAPY RXD/TAKEN: CPT | Mod: CPTII,,, | Performed by: ORTHOPAEDIC SURGERY

## 2023-02-20 PROCEDURE — 99024 PR POST-OP FOLLOW-UP VISIT: ICD-10-PCS | Mod: ,,, | Performed by: ORTHOPAEDIC SURGERY

## 2023-02-20 PROCEDURE — 3008F BODY MASS INDEX DOCD: CPT | Mod: CPTII,,, | Performed by: ORTHOPAEDIC SURGERY

## 2023-02-20 PROCEDURE — 1159F MED LIST DOCD IN RCRD: CPT | Mod: CPTII,,, | Performed by: ORTHOPAEDIC SURGERY

## 2023-02-20 PROCEDURE — 1159F PR MEDICATION LIST DOCUMENTED IN MEDICAL RECORD: ICD-10-PCS | Mod: CPTII,,, | Performed by: ORTHOPAEDIC SURGERY

## 2023-02-20 PROCEDURE — 99999 PR PBB SHADOW E&M-EST. PATIENT-LVL IV: CPT | Mod: PBBFAC,,, | Performed by: ORTHOPAEDIC SURGERY

## 2023-02-20 PROCEDURE — 4010F PR ACE/ARB THEARPY RXD/TAKEN: ICD-10-PCS | Mod: CPTII,,, | Performed by: ORTHOPAEDIC SURGERY

## 2023-02-20 PROCEDURE — 3074F SYST BP LT 130 MM HG: CPT | Mod: CPTII,,, | Performed by: ORTHOPAEDIC SURGERY

## 2023-02-20 PROCEDURE — 99024 POSTOP FOLLOW-UP VISIT: CPT | Mod: ,,, | Performed by: ORTHOPAEDIC SURGERY

## 2023-02-20 PROCEDURE — 3079F PR MOST RECENT DIASTOLIC BLOOD PRESSURE 80-89 MM HG: ICD-10-PCS | Mod: CPTII,,, | Performed by: ORTHOPAEDIC SURGERY

## 2023-02-20 PROCEDURE — 99214 OFFICE O/P EST MOD 30 MIN: CPT | Mod: PBBFAC,PN | Performed by: ORTHOPAEDIC SURGERY

## 2023-02-20 PROCEDURE — 3008F PR BODY MASS INDEX (BMI) DOCUMENTED: ICD-10-PCS | Mod: CPTII,,, | Performed by: ORTHOPAEDIC SURGERY

## 2023-02-20 PROCEDURE — 3074F PR MOST RECENT SYSTOLIC BLOOD PRESSURE < 130 MM HG: ICD-10-PCS | Mod: CPTII,,, | Performed by: ORTHOPAEDIC SURGERY

## 2023-02-20 PROCEDURE — 3079F DIAST BP 80-89 MM HG: CPT | Mod: CPTII,,, | Performed by: ORTHOPAEDIC SURGERY

## 2023-02-20 NOTE — PROGRESS NOTES
Post-op Note    HPI    Javier Enciso is here 6 weeks s/p the following procedure:     1/9/2023  Arthroscopic left knee chondroplasty  Arthroscopic Plica excision left knee    Overall doing ok.   Still having pain and effusions and swelling of the left knee.  Currently in physical therapy and making some progress but mostly having pain in the thigh region and back of the knee.  Denies any true mechanical symptoms or instability.    Physical Exam:     Patient is alert and oriented no acute distress.   Assistive Device:   None    Left knee:  range of motion 10-90.  There is a moderate to large suprapatellar joint effusion.  There is tenderness along the pes anserine bursa medially.  No true medial joint tenderness.  Stable to varus and valgus stress.  Negative Homans sign.  Stable Lachman's exam.    Assessment    Javier Colmenareshitesh is 6 weeks Post-op     Plan:    Overall doing as expected.  We discussed expectations of surgery and postoperative course.     Continued postoperative pain regimen -- Ibuprofen,   Tylenol   Continue physical therapy to work on range of motion strengthening   Compression sleeve   Left knee aspiration today performed at bedside.  I aspirated 35 cc the blood-tinged synovial fluid.       Follow up 6 weeks

## 2023-02-20 NOTE — PROCEDURES
Large Joint Aspiration/Injection    Date/Time: 2/20/2023 10:15 AM  Performed by: Tim Soto MD  Authorized by: Tim Soto MD     Indications:  Pain and joint swelling  Prep: patient was prepped and draped in usual sterile fashion    Local anesthetic:  Lidocaine spray    Details:  Needle Size:  18 G  Approach:  Lateral  Aspirate amount (mL):  35  Aspirate:  Blood-tinged and serous  Patient tolerance:  Patient tolerated the procedure well with no immediate complications

## 2023-02-22 ENCOUNTER — TELEPHONE (OUTPATIENT)
Dept: ORTHOPEDICS | Facility: CLINIC | Age: 48
End: 2023-02-22
Payer: MEDICAID
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
## 2023-02-24 ENCOUNTER — OFFICE VISIT (OUTPATIENT)
Dept: ORTHOPEDICS | Facility: CLINIC | Age: 48
End: 2023-02-24
Payer: MEDICAID

## 2023-02-24 VITALS
HEIGHT: 72 IN | BODY MASS INDEX: 29.54 KG/M2 | DIASTOLIC BLOOD PRESSURE: 78 MMHG | SYSTOLIC BLOOD PRESSURE: 116 MMHG | WEIGHT: 218.13 LBS | HEART RATE: 93 BPM

## 2023-02-24 DIAGNOSIS — G89.29 CHRONIC PAIN OF LEFT KNEE: Primary | ICD-10-CM

## 2023-02-24 DIAGNOSIS — M25.562 CHRONIC PAIN OF LEFT KNEE: Primary | ICD-10-CM

## 2023-02-24 PROCEDURE — 3074F SYST BP LT 130 MM HG: CPT | Mod: CPTII,,,

## 2023-02-24 PROCEDURE — 4010F ACE/ARB THERAPY RXD/TAKEN: CPT | Mod: CPTII,,,

## 2023-02-24 PROCEDURE — 3074F PR MOST RECENT SYSTOLIC BLOOD PRESSURE < 130 MM HG: ICD-10-PCS | Mod: CPTII,,,

## 2023-02-24 PROCEDURE — 3078F PR MOST RECENT DIASTOLIC BLOOD PRESSURE < 80 MM HG: ICD-10-PCS | Mod: CPTII,,,

## 2023-02-24 PROCEDURE — 1159F PR MEDICATION LIST DOCUMENTED IN MEDICAL RECORD: ICD-10-PCS | Mod: CPTII,,,

## 2023-02-24 PROCEDURE — 99999 PR PBB SHADOW E&M-EST. PATIENT-LVL IV: CPT | Mod: PBBFAC,,,

## 2023-02-24 PROCEDURE — 1159F MED LIST DOCD IN RCRD: CPT | Mod: CPTII,,,

## 2023-02-24 PROCEDURE — 3008F BODY MASS INDEX DOCD: CPT | Mod: CPTII,,,

## 2023-02-24 PROCEDURE — 99024 PR POST-OP FOLLOW-UP VISIT: ICD-10-PCS | Mod: ,,,

## 2023-02-24 PROCEDURE — 4010F PR ACE/ARB THEARPY RXD/TAKEN: ICD-10-PCS | Mod: CPTII,,,

## 2023-02-24 PROCEDURE — 3008F PR BODY MASS INDEX (BMI) DOCUMENTED: ICD-10-PCS | Mod: CPTII,,,

## 2023-02-24 PROCEDURE — 99024 POSTOP FOLLOW-UP VISIT: CPT | Mod: ,,,

## 2023-02-24 PROCEDURE — 99214 OFFICE O/P EST MOD 30 MIN: CPT | Mod: PBBFAC,PN

## 2023-02-24 PROCEDURE — 3078F DIAST BP <80 MM HG: CPT | Mod: CPTII,,,

## 2023-02-24 PROCEDURE — 99999 PR PBB SHADOW E&M-EST. PATIENT-LVL IV: ICD-10-PCS | Mod: PBBFAC,,,
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
## 2023-03-01 ENCOUNTER — PATIENT MESSAGE (OUTPATIENT)
Dept: ORTHOPEDICS | Facility: CLINIC | Age: 48
End: 2023-03-01
Payer: MEDICAID

## 2023-03-01 ENCOUNTER — TELEPHONE (OUTPATIENT)
Dept: ORTHOPEDICS | Facility: CLINIC | Age: 48
End: 2023-03-01
Payer: MEDICAID

## 2023-03-01 DIAGNOSIS — M25.552 PAIN IN LEFT HIP: Primary | ICD-10-CM

## 2023-03-08 ENCOUNTER — OFFICE VISIT (OUTPATIENT)
Dept: ORTHOPEDICS | Facility: CLINIC | Age: 48
End: 2023-03-08
Payer: MEDICAID

## 2023-03-08 VITALS
BODY MASS INDEX: 29.54 KG/M2 | HEART RATE: 103 BPM | DIASTOLIC BLOOD PRESSURE: 81 MMHG | HEIGHT: 72 IN | WEIGHT: 218.06 LBS | SYSTOLIC BLOOD PRESSURE: 130 MMHG

## 2023-03-08 DIAGNOSIS — M25.552 PAIN IN LEFT HIP: Primary | ICD-10-CM

## 2023-03-08 DIAGNOSIS — M71.22 BAKER'S CYST OF KNEE, LEFT: ICD-10-CM

## 2023-03-08 PROCEDURE — 3075F SYST BP GE 130 - 139MM HG: CPT | Mod: CPTII,,,

## 2023-03-08 PROCEDURE — 99214 OFFICE O/P EST MOD 30 MIN: CPT | Mod: PBBFAC,PN

## 2023-03-08 PROCEDURE — 3079F PR MOST RECENT DIASTOLIC BLOOD PRESSURE 80-89 MM HG: ICD-10-PCS | Mod: CPTII,,,

## 2023-03-08 PROCEDURE — 3051F PR MOST RECENT HEMOGLOBIN A1C LEVEL 7.0 - < 8.0%: ICD-10-PCS | Mod: CPTII,,,

## 2023-03-08 PROCEDURE — 3008F PR BODY MASS INDEX (BMI) DOCUMENTED: ICD-10-PCS | Mod: CPTII,,,

## 2023-03-08 PROCEDURE — 3079F DIAST BP 80-89 MM HG: CPT | Mod: CPTII,,,

## 2023-03-08 PROCEDURE — 99999 PR PBB SHADOW E&M-EST. PATIENT-LVL IV: ICD-10-PCS | Mod: PBBFAC,,,

## 2023-03-08 PROCEDURE — 4010F PR ACE/ARB THEARPY RXD/TAKEN: ICD-10-PCS | Mod: CPTII,,,

## 2023-03-08 PROCEDURE — 99024 POSTOP FOLLOW-UP VISIT: CPT | Mod: ,,,

## 2023-03-08 PROCEDURE — 99024 PR POST-OP FOLLOW-UP VISIT: ICD-10-PCS | Mod: ,,,

## 2023-03-08 PROCEDURE — 1159F PR MEDICATION LIST DOCUMENTED IN MEDICAL RECORD: ICD-10-PCS | Mod: CPTII,,,

## 2023-03-08 PROCEDURE — 3008F BODY MASS INDEX DOCD: CPT | Mod: CPTII,,,

## 2023-03-08 PROCEDURE — 3075F PR MOST RECENT SYSTOLIC BLOOD PRESS GE 130-139MM HG: ICD-10-PCS | Mod: CPTII,,,

## 2023-03-08 PROCEDURE — 3051F HG A1C>EQUAL 7.0%<8.0%: CPT | Mod: CPTII,,,

## 2023-03-08 PROCEDURE — 99999 PR PBB SHADOW E&M-EST. PATIENT-LVL IV: CPT | Mod: PBBFAC,,,

## 2023-03-08 PROCEDURE — 4010F ACE/ARB THERAPY RXD/TAKEN: CPT | Mod: CPTII,,,

## 2023-03-08 PROCEDURE — 20610 DRAIN/INJ JOINT/BURSA W/O US: CPT | Mod: PBBFAC,PN

## 2023-03-08 PROCEDURE — 1159F MED LIST DOCD IN RCRD: CPT | Mod: CPTII,,,

## 2023-03-08 NOTE — PROGRESS NOTES
Post-op Note    HPI    Javier Enciso is here 8 weeks s/p the following procedure:     1/9/2023  Arthroscopic left knee chondroplasty  Arthroscopic Plica excision left knee    Overall doing ok. She is here today for aspiration. Reports she is unable to bend her knee due to fluid. She has been wearing a sleeve from PT.     Physical Exam:     Patient is alert and oriented no acute distress.   Assistive Device:   None    Left knee:  There is a moderate to large suprapatellar joint effusion.  There is tenderness along the tibial tubercle.  No true medial joint tenderness.  Stable to varus and valgus stress.  Negative Homans sign.  Stable Lachman's exam. No erythema or ecchymosis noted.     Assessment    Javier Enciso is 6 weeks Post-op     Plan:    Overall doing as expected.  We discussed expectations of surgery and postoperative course.     Aspiration 27 ccs blood tinged fluid   Continued postoperative pain regimen -- Ibuprofen TID, alternate Tylenol if needed. May take Flexeril at night if needed   Cont PT, recommended patient buy OTC copper sleeve for compression  She may return for repeat aspiration in interim      Follow up 4 weeks with otis

## 2023-03-08 NOTE — PROCEDURES
Large Joint Aspiration/Injection: L knee    Date/Time: 3/8/2023 9:00 AM  Performed by: Adrianne Dumont PA-C  Authorized by: Adrianne Dumont PA-C     Consent Done?:  Yes (Verbal)  Indications:  Pain  Site marked: the procedure site was marked    Timeout: prior to procedure the correct patient, procedure, and site was verified    Local anesthetic:  Bupivacaine 0.25% without epinephrine and topical anesthetic  Anesthetic total (ml):  5      Details:  Needle Size:  18 G  Approach: superolateral.  Location:  Knee  Site:  L knee  Aspirate amount (mL):  27  Aspirate:  Blood-tinged  Patient tolerance:  Patient tolerated the procedure well with no immediate complications

## 2023-03-10 ENCOUNTER — TELEPHONE (OUTPATIENT)
Dept: DIABETES | Facility: CLINIC | Age: 48
End: 2023-03-10
Payer: MEDICAID

## 2023-03-10 NOTE — PROGRESS NOTES
CC:   Chief Complaint   Patient presents with    Diabetes Mellitus       HPI: Javier Enciso is a 48 y.o. female presents for a follow up visit today for the management of T2DM.     She was diagnosed with prediabetes in 2018 and was on Metformin, developed T2DM around 2020. She went to the ER in December 2021 and she was started on insulin therapy.     Family hx of diabetes: mother and father   Hospitalized for diabetes: denies   Insulin therapy: December 2021 4/2022- TOM + 0.04 - slightly positive   4/2022- c-peptide level elevated 5.53  10/2022- repeat c-peptide level  elevated 5.72    No personal or FH of thyroid cancer or personal of pancreatic cancer or pancreatitis.     Hx of thyroidectomy due to cysts- January 2012-- not cancerous. LT4 150 mcg daily   Last TSH WNL --she reports that she is taking her levothyroxine      Our last visit was in December of 2022  We discussed we can treat her as a type 2 diabetic  At that visit we continued the Lantus 15 units nightly  We stop the scheduled insulin and was going to continue the NovoLog sliding scale as needed  We were going to start her on Ozempic weekly  Continue the Dexcom personal CGM  Recent A1c of 7.6%  Blood sugar readings are above goal on Dexcom download  See below  She reports that she stopped the Ozempic after a month of use due to nausea -- extreme nausea. Even at a low dose of the ozempic   January she had left knee surgery - -having post op issues-- fluid in the knee -- does not have full ROM -- following with PT   Recently she restarted her NovoLog with meals yet blood sugar readings are still above goal  Reports compliance with her Lantus  She may be interested in insulin pump or the V Go               DIABETES COMPLICATIONS: peripheral neuropathy      Diabetes Management Status    ASA: stopped ASA     Statin: Taking--Crestor 40 mg  ACE/ARB: Taking--losartan 50 mg      The 10-year ASCVD risk score (Kizzy HARRISON, et al., 2019) is: 8%    Values  used to calculate the score:      Age: 48 years      Sex: Female      Is Non- : Yes      Diabetic: Yes      Tobacco smoker: No      Systolic Blood Pressure: 128 mmHg      Is BP treated: Yes      HDL Cholesterol: 38 mg/dL      Total Cholesterol: 139 mg/dL      Screening or Prevention Patient's value Goal Complete/Controlled?   HgA1C Testing and Control   Lab Results   Component Value Date    HGBA1C 7.6 (H) 03/07/2023      Annually/Less than 8% No   Lipid profile : 10/05/2022 Annually Yes   LDL control Lab Results   Component Value Date    LDLCALC 85.8 10/05/2022    Annually/Less than 100 mg/dl  No   Nephropathy screening Lab Results   Component Value Date    LABMICR <5.0 10/05/2022     Lab Results   Component Value Date    PROTEINUA Negative 10/05/2022    Annually No   Blood pressure BP Readings from Last 1 Encounters:   03/13/23 128/80    Less than 140/90 Yes   Dilated retinal exam Most Recent Eye Exam Date: Not Found Annually No   Foot exam   : 02/21/2022 Annually No       CURRENT A1C:    Hemoglobin A1C   Date Value Ref Range Status   03/07/2023 7.6 (H) 4.0 - 5.6 % Final     Comment:     ADA Screening Guidelines:  5.7-6.4%  Consistent with prediabetes  >or=6.5%  Consistent with diabetes    High levels of fetal hemoglobin interfere with the HbA1C  assay. Heterozygous hemoglobin variants (HbS, HgC, etc)do  not significantly interfere with this assay.   However, presence of multiple variants may affect accuracy.     10/05/2022 6.9 (H) 4.0 - 5.6 % Final     Comment:     ADA Screening Guidelines:  5.7-6.4%  Consistent with prediabetes  >or=6.5%  Consistent with diabetes    High levels of fetal hemoglobin interfere with the HbA1C  assay. Heterozygous hemoglobin variants (HbS, HgC, etc)do  not significantly interfere with this assay.   However, presence of multiple variants may affect accuracy.     07/08/2022 6.4 (H) 4.0 - 5.6 % Final     Comment:     ADA Screening Guidelines:  5.7-6.4%  Consistent  with prediabetes  >or=6.5%  Consistent with diabetes    High levels of fetal hemoglobin interfere with the HbA1C  assay. Heterozygous hemoglobin variants (HbS, HgC, etc)do  not significantly interfere with this assay.   However, presence of multiple variants may affect accuracy.         GOAL A1C: 6.5% without hypoglycemia       DM MEDICATIONS USED IN THE PAST: Lantus, Novolog   Metformin -- wasn't working   dexcom   Ozempic -- nausea       CURRENT DIABETES MEDICATIONS: Lantus 15 units nightly (9PM) and  She restarted the  Novolog 5 units TID Ac-- last week   Stopped the the ozempic   Insulin:  pens.    Missed doses: missing doses of novolog   Occ missing the Lantus -- maybe once a month         BLOOD GLUCOSE MONITORING:   Sensor type: Dexcom G6   Average BG readin  Time in range: 46%   Estimated A1c is 8 %   41% high and 13% very high   0% low and 0% very low   Site change:  q10 days    Two weeks prior the average was 185 in target range 51% of the time with an estimated A1c of 7.7%      Supplies from [employer] or diabetes management supply??     Sensor was downloaded in clinic today and reviewed with patient.   Please see attached document for download.       HYPOGLYCEMIA: 0% on dexcom download   <1% low   0% low - 2 weeks prior   Glucagon kit: [employer]    Medic alert bracelet: denies       MEALS: eating 3 meals per day   BF: cup of coffee, oatmeal with waffle or wheat bread with egg- or [employer]   Lunch: job pays for lunch--- fast food- or meat with veggie - or Burger and No fries   Dinner: veggie with meat -- rarely does rice -- spinach, [place] cut green beans or broccoli   Snack: rarely   Drinks: -- previously was drinking cranberry juice -- she was doing sugary beverage until recently   So she stopped the juices   Water makes her own tea with sugar substitute          CURRENT EXERCISE:  PT 2 times per week for her knee       Review of Systems  Review of Systems   Constitutional:  Negative for appetite  change, fatigue and unexpected weight change.   HENT:  Negative for trouble swallowing.    Eyes:  Negative for visual disturbance.   Respiratory:  Negative for shortness of breath.    Cardiovascular:  Negative for chest pain.   Gastrointestinal:  Negative for nausea.   Endocrine: Negative for polydipsia, polyphagia and polyuria.   Genitourinary:         No Nocturia    Musculoskeletal:  Positive for arthralgias (left knee) and back pain.   Skin:  Negative for wound.   Neurological:  Positive for numbness.     Physical Exam   Physical Exam  Vitals and nursing note reviewed.   Constitutional:       Appearance: She is well-developed.      Comments: Overweight female   HENT:      Head: Normocephalic and atraumatic.      Right Ear: External ear normal.      Left Ear: External ear normal.      Nose: Nose normal.   Neck:      Thyroid: No thyromegaly.      Trachea: No tracheal deviation.   Cardiovascular:      Rate and Rhythm: Normal rate and regular rhythm.      Heart sounds: No murmur heard.  Pulmonary:      Effort: Pulmonary effort is normal. No respiratory distress.      Breath sounds: Normal breath sounds.   Abdominal:      Palpations: Abdomen is soft.      Tenderness: There is no abdominal tenderness.      Hernia: No hernia is present.   Musculoskeletal:      Cervical back: Normal range of motion and neck supple.   Skin:     General: Skin is warm and dry.      Capillary Refill: Capillary refill takes less than 2 seconds.      Findings: No rash.      Comments: Injection sites and dexcom sites are normal appearing. No lipo hypertropthy or atrophy     Neurological:      Mental Status: She is alert and oriented to person, place, and time.      Cranial Nerves: No cranial nerve deficit.   Psychiatric:         Behavior: Behavior normal.         Judgment: Judgment normal.       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 This could be euglycemic DKA.  -- please hold medication 3 days prior to surgery or if you are sick and have decreased intake.   -- Please drink lots of water daily while on this medication.   --This medication could also give you a yeast infection- please let us know if this occurs and we can treat it.   -- no keto diet     Adjust the Lantus to 20 units nightly -- same time every night     Adjust your Novolog to 3 units with meals   With using correction scale: before you eat only     150-200: +1 unit  201-250: +2 units  251-300: +3 units  301-350: +4 units  >350: +5 units    Think about VGO!! Or the omnipod   I checked with the Omnipod representative and it should be covered under her insurance at the pharmacy    Continue to use the dexcom        -- Reviewed goals of therapy are to get the best control we can without hypoglycemia.  -- Reviewed patient's current insulin regimen. Clarified proper insulin dose and timing in relation to meals, etc. Insulin injection sites and proper rotation instructed.    -- Advised frequent self blood glucose monitoring.  Patient encouraged to document glucose results and bring them to every clinic visit.  Continue to use the dexcom personal CGM   -- Hypoglycemia precautions discussed. Instructed on precautions before driving.    -- Call for Bg repeatedly < 70 or > 180.   -- Close adherence to lifestyle changes recommended.   -- Periodic follow ups for eye evaluations, foot care and dental care suggested.        Patient has diabetes mellitus and  manages diabetes with intensive insulin regimen and uses prandial and basal insulin daily  Patient requires a therapeutic CGM and is willing to use therapeutic CGM for the necessary frequent adjustments of insulin therapy.  I have completed an in-person visit during the previous 6 months and will continue to have in-person visits every 6 months to assess adherence to their CGM regimen and diabetes treatment plan.  Due to COVID pandemic and need for remote monitoring this tool is medically necessary            Type 2 diabetes mellitus with diabetic polyneuropathy, with long-term current use of insulin  Optimize BG readings.   See above.    she is following with an external podiatrist    Educated patient to check feet daily for any foreign objects and/or wounds. Discussed with patient the importance of wearing appropriate footwear at all times, not to walk barefoot ever, and to check shoes before putting them on feet. Instructed patient to keep feet dry by regularly changing shoes and socks and drying feet after baths and exercises. Also, instructed patient to report any new lesions, discolorations, or swelling to a healthcare professional.        Primary hypertension  BP goal is < 140/90.   Tolerating ARB  Controlled   Blood pressure goals discussed with patient      Pure hypercholesterolemia  On statin per ADA recommendations  LDL goal < 100. LDL at goal. LFTs WNL. Continue statin.         Overweight (BMI 25.0-29.9)  Body mass index is 29.97 kg/m².  Increases insulin resistance.   Discussed DM diet and exercise.       Postoperative hypothyroidism  TSH WNL, on LT4 150 mcg, reinforced take by itself on empty stomach, first thing in the morning and wait at least 30-60 minutes to eat, drink, or take other medications.      Seen by General Endocrine in August of 2022-- Dr. Kumar     Check TSH with RTC     History of thyroidectomy, total  On LT4   Reports cysts that were removed were  Benign  Evaluated by General  Endocrine in August of 2022-- Dr. NAHUN Santos          Spent 25 minutes with patient with > 50% time spent in counseling, as noted above on medication options, insulin, Jardiance      Follow up in about 3 months (around 6/13/2023).  With labs prior       Orders Placed This Encounter   Procedures    Hemoglobin A1C     Standing Status:   Future     Standing Expiration Date:   9/13/2024    TSH     Standing Status:   Future     Standing Expiration Date:   9/13/2024    Basic Metabolic Panel     Standing Status:   Future     Standing Expiration Date:   9/13/2024       Recommendations were discussed with the patient in detail  The patient verbalized understanding and agrees with the plan outlined as above.     This note was partly generated with [employer] voice recognition software. I apologize for any possible typographical errors.

## 2023-03-13 ENCOUNTER — OFFICE VISIT (OUTPATIENT)
Dept: DIABETES | Facility: CLINIC | Age: 48
End: 2023-03-13
Payer: MEDICAID

## 2023-03-13 VITALS
DIASTOLIC BLOOD PRESSURE: 80 MMHG | HEIGHT: 72 IN | SYSTOLIC BLOOD PRESSURE: 128 MMHG | HEART RATE: 78 BPM | OXYGEN SATURATION: 98 % | BODY MASS INDEX: 29.93 KG/M2 | WEIGHT: 221 LBS

## 2023-03-13 DIAGNOSIS — I10 PRIMARY HYPERTENSION: Chronic | ICD-10-CM

## 2023-03-13 DIAGNOSIS — E66.3 OVERWEIGHT (BMI 25.0-29.9): ICD-10-CM

## 2023-03-13 DIAGNOSIS — E13.9 LADA (LATENT AUTOIMMUNE DIABETES MELLITUS IN ADULTS): Primary | ICD-10-CM

## 2023-03-13 DIAGNOSIS — E89.0 HISTORY OF THYROIDECTOMY, TOTAL: ICD-10-CM

## 2023-03-13 DIAGNOSIS — Z71.9 HEALTH EDUCATION/COUNSELING: ICD-10-CM

## 2023-03-13 DIAGNOSIS — B37.31 YEAST VAGINITIS: ICD-10-CM

## 2023-03-13 DIAGNOSIS — E78.00 PURE HYPERCHOLESTEROLEMIA: Chronic | ICD-10-CM

## 2023-03-13 DIAGNOSIS — E89.0 POSTOPERATIVE HYPOTHYROIDISM: ICD-10-CM

## 2023-03-13 DIAGNOSIS — E11.42 TYPE 2 DIABETES MELLITUS WITH DIABETIC POLYNEUROPATHY, WITH LONG-TERM CURRENT USE OF INSULIN: ICD-10-CM

## 2023-03-13 DIAGNOSIS — Z79.4 TYPE 2 DIABETES MELLITUS WITH DIABETIC POLYNEUROPATHY, WITH LONG-TERM CURRENT USE OF INSULIN: ICD-10-CM

## 2023-03-13 PROCEDURE — 1160F RVW MEDS BY RX/DR IN RCRD: CPT | Mod: CPTII,,, | Performed by: NURSE PRACTITIONER

## 2023-03-13 PROCEDURE — 99999 PR PBB SHADOW E&M-EST. PATIENT-LVL V: ICD-10-PCS | Mod: PBBFAC,,, | Performed by: NURSE PRACTITIONER

## 2023-03-13 PROCEDURE — 99214 PR OFFICE/OUTPT VISIT, EST, LEVL IV, 30-39 MIN: ICD-10-PCS | Mod: S$PBB,,, | Performed by: NURSE PRACTITIONER

## 2023-03-13 PROCEDURE — 3051F PR MOST RECENT HEMOGLOBIN A1C LEVEL 7.0 - < 8.0%: ICD-10-PCS | Mod: CPTII,,, | Performed by: NURSE PRACTITIONER

## 2023-03-13 PROCEDURE — 99214 OFFICE O/P EST MOD 30 MIN: CPT | Mod: S$PBB,,, | Performed by: NURSE PRACTITIONER

## 2023-03-13 PROCEDURE — 1159F PR MEDICATION LIST DOCUMENTED IN MEDICAL RECORD: ICD-10-PCS | Mod: CPTII,,, | Performed by: NURSE PRACTITIONER

## 2023-03-13 PROCEDURE — 3051F HG A1C>EQUAL 7.0%<8.0%: CPT | Mod: CPTII,,, | Performed by: NURSE PRACTITIONER

## 2023-03-13 PROCEDURE — 95251 CONT GLUC MNTR ANALYSIS I&R: CPT | Mod: ,,, | Performed by: NURSE PRACTITIONER

## 2023-03-13 PROCEDURE — 99215 OFFICE O/P EST HI 40 MIN: CPT | Mod: PBBFAC,PN | Performed by: NURSE PRACTITIONER

## 2023-03-13 PROCEDURE — 3079F DIAST BP 80-89 MM HG: CPT | Mod: CPTII,,, | Performed by: NURSE PRACTITIONER

## 2023-03-13 PROCEDURE — 3008F BODY MASS INDEX DOCD: CPT | Mod: CPTII,,, | Performed by: NURSE PRACTITIONER

## 2023-03-13 PROCEDURE — 99999 PR PBB SHADOW E&M-EST. PATIENT-LVL V: CPT | Mod: PBBFAC,,, | Performed by: NURSE PRACTITIONER

## 2023-03-13 PROCEDURE — 3079F PR MOST RECENT DIASTOLIC BLOOD PRESSURE 80-89 MM HG: ICD-10-PCS | Mod: CPTII,,, | Performed by: NURSE PRACTITIONER

## 2023-03-13 PROCEDURE — 1159F MED LIST DOCD IN RCRD: CPT | Mod: CPTII,,, | Performed by: NURSE PRACTITIONER

## 2023-03-13 PROCEDURE — 4010F PR ACE/ARB THEARPY RXD/TAKEN: ICD-10-PCS | Mod: CPTII,,, | Performed by: NURSE PRACTITIONER

## 2023-03-13 PROCEDURE — 3074F PR MOST RECENT SYSTOLIC BLOOD PRESSURE < 130 MM HG: ICD-10-PCS | Mod: CPTII,,, | Performed by: NURSE PRACTITIONER

## 2023-03-13 PROCEDURE — 95251 PR GLUCOSE MONITOR, 72 HOUR, PHYS INTERP: ICD-10-PCS | Mod: ,,, | Performed by: NURSE PRACTITIONER

## 2023-03-13 PROCEDURE — 4010F ACE/ARB THERAPY RXD/TAKEN: CPT | Mod: CPTII,,, | Performed by: NURSE PRACTITIONER

## 2023-03-13 PROCEDURE — 1160F PR REVIEW ALL MEDS BY PRESCRIBER/CLIN PHARMACIST DOCUMENTED: ICD-10-PCS | Mod: CPTII,,, | Performed by: NURSE PRACTITIONER

## 2023-03-13 PROCEDURE — 3008F PR BODY MASS INDEX (BMI) DOCUMENTED: ICD-10-PCS | Mod: CPTII,,, | Performed by: NURSE PRACTITIONER

## 2023-03-13 PROCEDURE — 3074F SYST BP LT 130 MM HG: CPT | Mod: CPTII,,, | Performed by: NURSE PRACTITIONER

## 2023-03-13 RX ORDER — INSULIN GLARGINE 100 [IU]/ML
20 INJECTION, SOLUTION SUBCUTANEOUS NIGHTLY
Qty: 4.5 ML | Refills: 3 | Status: SHIPPED | OUTPATIENT
Start: 2023-03-13 | End: 2023-03-13 | Stop reason: SDUPTHER

## 2023-03-13 RX ORDER — INSULIN GLARGINE 100 [IU]/ML
20 INJECTION, SOLUTION SUBCUTANEOUS NIGHTLY
Qty: 9 ML | Refills: 6 | Status: SHIPPED | OUTPATIENT
Start: 2023-03-13 | End: 2023-06-16 | Stop reason: SDUPTHER

## 2023-03-13 RX ORDER — FLUCONAZOLE 150 MG/1
150 TABLET ORAL DAILY
Qty: 2 TABLET | Refills: 1 | Status: SHIPPED | OUTPATIENT
Start: 2023-03-13 | End: 2023-03-14

## 2023-03-13 RX ORDER — INSULIN ASPART 100 [IU]/ML
INJECTION, SOLUTION INTRAVENOUS; SUBCUTANEOUS
Qty: 9 ML | Refills: 6
Start: 2023-03-13 | End: 2023-06-16 | Stop reason: SDUPTHER

## 2023-03-13 NOTE — ASSESSMENT & PLAN NOTE
Body mass index is 29.97 kg/m².  Increases insulin resistance.   Discussed DM diet and exercise.

## 2023-03-13 NOTE — PATIENT INSTRUCTIONS
Start Jardiance 25 mg Daily  -- Please let us know if you have nausea, vomiting, or weakness with a normal BG. This could be euglycemic DKA.  -- please hold medication 3 days prior to surgery or if you are sick and have decreased intake.   -- Please drink lots of water daily while on this medication.   --This medication could also give you a yeast infection- please let us know if this occurs and we can treat it.   -- no keto diet     Adjust the Lantus to 20 units nightly -- same time every night     Adjust your Novolog to 3 units with meals   With using correction scale: before you eat only     150-200: +1 unit  201-250: +2 units  251-300: +3 units  301-350: +4 units  >350: +5 units    Think about VGO!!

## 2023-03-13 NOTE — ASSESSMENT & PLAN NOTE
TSH WNL, on LT4 150 mcg, reinforced take by itself on empty stomach, first thing in the morning and wait at least 30-60 minutes to eat, drink, or take other medications.      Seen by General Endocrine in August of 2022-- Dr. Kumar     Check TSH with RTC

## 2023-03-13 NOTE — ASSESSMENT & PLAN NOTE
Uncontrolled   + TOM   c-peptide level WNL X2 now   Can treat as T2DM   Dexcom is helping with self awareness and self accountability  Unable to tolerate Ozempic due to extreme nausea  Discussed SGLT2 instead   We also discussed V Go and Omnipod--brochures provided to patient        -- Medication Changes:   Start Jardiance 25 mg Daily  -- Please let us know if you have nausea, vomiting, or weakness with a normal BG. This could be euglycemic DKA.  -- please hold medication 3 days prior to surgery or if you are sick and have decreased intake.   -- Please drink lots of water daily while on this medication.   --This medication could also give you a yeast infection- please let us know if this occurs and we can treat it.   -- no keto diet     Adjust the Lantus to 20 units nightly -- same time every night     Adjust your Novolog to 3 units with meals   With using correction scale: before you eat only     150-200: +1 unit  201-250: +2 units  251-300: +3 units  301-350: +4 units  >350: +5 units    Think about VGO!! Or the omnipod   I checked with the Omnip[employer] representative and it should be covered under her insurance at the pharmacy    Continue to use the dexcom        -- Reviewed goals of therapy are to get the best control we can without hypoglycemia.  -- Reviewed patient's current insulin regimen. Clarified proper insulin dose and timing in relation to meals, etc. Insulin injection sites and proper rotation instructed.    -- Advised frequent self blood glucose monitoring.  Patient encouraged to document glucose results and bring them to every clinic visit.  Continue to use the dexcom personal CGM   -- Hypoglycemia precautions discussed. Instructed on precautions before driving.    -- Call for Bg repeatedly < 70 or > 180.   -- Close adherence to lifestyle changes recommended.   -- Periodic follow ups for eye evaluations, foot care and dental care suggested.        Patient has diabetes mellitus and manages diabetes with  intensive insulin regimen and uses prandial and basal insulin daily  Patient requires a therapeutic CGM and is willing to use therapeutic CGM for the necessary frequent adjustments of insulin therapy.  I have completed an in-person visit during the previous 6 months and will continue to have in-person visits every 6 months to assess adherence to their CGM regimen and diabetes treatment plan.  Due to COVID pandemic and need for remote monitoring this tool is medically necessary

## 2023-03-24 ENCOUNTER — TELEPHONE (OUTPATIENT)
Dept: ORTHOPEDICS | Facility: CLINIC | Age: 48
End: 2023-03-24
Payer: MEDICAID

## 2023-03-24 NOTE — PROGRESS NOTES
"Patient ID: Javier Enciso is a 47 y.o. female    Pain of the Left Knee      History of Present Illness:    Javier Enciso presents to clinic for left knee pain Patient denies known YAQUELIN. The pain started 1  year  ago and is becoming progressively worse.  Pain is located over (points to) posterior left knee. She reports that the pain is a 7 /10 pulling pain today. The pain is affecting ADLs and limiting desired level of activity. Denies numbness, tingling, radiation and inability to bear weight.  Pain is 8 /10 at its worst. Reports persistent clicking, locking and instability subjectively after a fall 1 year ago.     Trail of  voltaren gel and intermittent ibuprofen  with no improvement.     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    Ambulating: unassisted  Diabetic: yes     Lab Results   Component Value Date    HGBA1C 6.4 (H) 07/08/2022       PAST MEDICAL HISTORY:   Past Medical History:   Diagnosis Date    Diabetes mellitus, type 2      PAST SURGICAL HISTORY:   Past Surgical History:   Procedure Laterality Date    BREAST CYST ASPIRATION Left 1996    BREAST CYST ASPIRATION Right 2001     FAMILY HISTORY:   Family History   Problem Relation Age of Onset    Diabetes Mother     Diabetes Father     Breast cancer Maternal Aunt      SOCIAL HISTORY:   Social History     Occupational History    Not on file   Tobacco Use    Smoking status: Never    Smokeless tobacco: Never   Substance and Sexual Activity    Alcohol use: Yes     Comment: rare    Drug use: Never    Sexual activity: Yes        MEDICATIONS:   Current Outpatient Medications:     ALPRAZolam (XANAX) 0.25 MG tablet, Take 1 tablet (0.25 mg total) by mouth nightly as needed for Anxiety., Disp: 30 tablet, Rfl: 3    amLODIPine (NORVASC) 10 MG tablet, Take 1 tablet (10 mg total) by mouth once daily., Disp: 30 tablet, Rfl: 3    BD VARUN 2ND GEN PEN NEEDLE 32 gauge x 5/32" Ndle, To use with insulin injections 5 times per day, Disp: 150 each, Rfl: 11    " butalbital-acetaminophen-caffeine -40 mg (FIORICET, ESGIC) -40 mg per tablet, Take 1 tablet by mouth every 8 (eight) hours as needed for Headaches (every 8 hours prn)., Disp: 30 tablet, Rfl: 3    cetirizine (ZYRTEC) 10 MG tablet, Take 1 tablet (10 mg total) by mouth nightly., Disp: 30 tablet, Rfl: 3    diclofenac sodium (SOLARAZE) 3 % gel, AAA TID PRN, Disp: 100 g, Rfl: 3    docusate sodium (COLACE) 100 MG capsule, Take 1 capsule (100 mg total) by mouth 2 (two) times daily., Disp: 60 capsule, Rfl: 3    ergocalciferol (ERGOCALCIFEROL) 50,000 unit Cap, Take 1 capsule (50,000 Units total) by mouth every 7 days., Disp: 4 capsule, Rfl: 3    esomeprazole (NEXIUM) 40 MG capsule, Take 1 capsule (40 mg total) by mouth before breakfast., Disp: 30 capsule, Rfl: 3    glucagon (BAQSIMI) 3 mg/actuation Spry, 3 mg (one actuation) into a single nostril; if no response, may repeat in 15 minutes using a new intranasal device., Disp: 2 each, Rfl: 1    insulin aspart U-100 (NOVOLOG FLEXPEN U-100 INSULIN) 100 unit/mL (3 mL) InPn pen, Inject 5 units with meals plus correction scale.  Max total daily dose of 50 units., Disp: 15 mL, Rfl: 6    levothyroxine (SYNTHROID) 150 MCG tablet, Take 1 tablet (150 mcg total) by mouth every morning., Disp: 30 tablet, Rfl: 3    losartan (COZAAR) 50 MG tablet, Take 1 tablet (50 mg total) by mouth once daily., Disp: 30 tablet, Rfl: 3    montelukast (SINGULAIR) 10 mg tablet, Take 1 tablet (10 mg total) by mouth once daily., Disp: 30 tablet, Rfl: 3    rosuvastatin (CRESTOR) 40 MG Tab, Take 1 tablet (40 mg total) by mouth every evening., Disp: 90 tablet, Rfl: 3    sucralfate (CARAFATE) 1 gram tablet, Take 1 tablet (1 g total) by mouth 3 (three) times daily., Disp: 90 tablet, Rfl: 3    XHANCE 93 mcg/actuation AerB, USE 1 SPRAY IN EACH NOSTRIL TWICE DAILY AS DIRECTED, Disp: 16 mL, Rfl: 3    famotidine (PEPCID) 40 MG tablet, Take 40 mg by mouth once daily., Disp: , Rfl:     insulin (LANTUS SOLOSTAR  U-100 INSULIN) glargine 100 units/mL (3mL) SubQ pen, Inject 15 Units into the skin every evening. Titrate up to fasting blood sugar less than 130.Max total daily dose of 30 units., Disp: 15 mL, Rfl: 6    meloxicam (MOBIC) 15 MG tablet, Take 1 tablet (15 mg total) by mouth once daily. (Patient not taking: No sig reported), Disp: 30 tablet, Rfl: 3    metoprolol succinate (TOPROL-XL) 50 MG 24 hr tablet, Take 1 tablet (50 mg total) by mouth once daily. (Patient not taking: No sig reported), Disp: 30 tablet, Rfl: 3    nortriptyline (PAMELOR) 10 MG capsule, Take 1 capsule (10 mg total) by mouth every evening. (Patient not taking: No sig reported), Disp: 30 capsule, Rfl: 3  ALLERGIES:   Review of patient's allergies indicates:   Allergen Reactions    Hydrocodone Itching    Hydrocodone-acetaminophen      Itchy (skin)^         Physical Exam     Vitals:    10/03/22 1446   BP: 110/70   Pulse: 89     Alert and oriented to person, place and time. No acute distress. Well-groomed, not ill appearing. Pupils round and reactive, normal respiratory effort, no audible wheezing.     OBSERVATION / INSPECTION   Gait:   Nonantalgic   Alignment:  Neutral   Scars:   None   Muscle atrophy: None  Effusion:  None   Warmth:  None   Discoloration:   None     TENDERNESS                 Patella     Negative    Peripatellar medial    Negative   Peripatellar lateral   Negative   Patellar tendon   Negative   Quad tendon     Negative    Prepatellar Bursa   Negative     Tibial tubercle    Negative    Pes anserine/HS   Negative      ITB     Negative      LCL     Negative  MFC     Negative  LFC     Negative  MCL      Negative  Medial Joint Line    POS left   Lateral Joint Line   POS left  Quadriceps    Negative  Hamstring     POS left            Range of  Motion:        Left knee:   5-130°     Patellofemoral examination:     Patella position    Centered  Crepitus (PF):    Absent   Lateral tilt:    Normal   J-sign:     None   Patellofemoral grind:   No  pain       MENISCAL SIGNS:     Pain on terminal extension:  +  Pain on terminal flexion:  +  Александрs maneuver:  + medial and lateral      LIGAMENT EXAMINATION:  ACL / Lachman:  normal   PCL-Post.  drawer: normal 0 to 2mm  MCL- Valgus:  normal 0 to 2mm  LCL- Varus:    normal 0 to 2mm    Dial Test: difference c/w other side  At 30° flexion: normal (< 5°)   At 90° flexion: normal (< 5°)       STRENGTH: (* = with pain)   Quadricep   5/5  Hamstrin/5    EXTREMITY NEURO-VASCULAR EXAMINATION:   Sensation:  Grossly intact to light touch all dermatomal regions.   Motor Function:  Fully intact motor function at hip, knee, foot and ankle    DTRs;  quadriceps and  achilles 2+.  No clonus and downgoing Babinski.    Vascular status:  DP and PT pulses 2+, brisk capillary refill, symmetric.     Imaging:     left Knee X-rays ordered/reviewed by me showing no evidence of fracture or dislocation. There is no obvious malalignment. No evidence of masses, lesions or foreign bodies. Mild medial femorotibial and lateral patellofemoral joint space narrowing.     Assessment & Plan    Knee joint disorder (narrowing), left  -     Ambulatory referral/consult to Orthopedics       I made the decision to obtain old records of the patient including previous notes and imaging. New imaging was ordered today of the extremity or extremities evaluated. I independently reviewed and interpreted the radiographs and/or MRIs/CT scan today as well as prior imaging.    We discussed at length different treatment options including conservative vs surgical management. These include anti-inflammatories, acetaminophen, rest, ice, heat, formal physical therapy including strengthening and stretching exercises, home exercise programs, injections, dry needling, and finally surgical intervention.  Patient reports persistent mechanical symptoms and locking sensation in her knee. In setting of normal radiographs with minimal radiographic OA and persistent  mechanical symptoms after a traumatic fall, MRI is indicated.     Patient would like to proceed with MRI    Left knee MRI without contrast  Continue Voltaren gel as needed  Ice compress to the affected area 2-3x a day for 15-20 minutes as needed for pain management    Follow up: MRI results  X-rays next visit: none    All questions were answered and patient is agreeable to the above plan.                    clear

## 2023-03-28 PROBLEM — R29.898 WEAKNESS OF LEFT LOWER EXTREMITY: Status: RESOLVED | Noted: 2023-01-12 | Resolved: 2023-03-28

## 2023-03-28 PROBLEM — R26.89 ANTALGIC GAIT: Status: RESOLVED | Noted: 2023-01-12 | Resolved: 2023-03-28

## 2023-03-28 PROBLEM — M25.662 DECREASED RANGE OF MOTION (ROM) OF LEFT KNEE: Status: RESOLVED | Noted: 2023-01-12 | Resolved: 2023-03-28

## 2023-04-03 ENCOUNTER — OFFICE VISIT (OUTPATIENT)
Dept: ORTHOPEDICS | Facility: CLINIC | Age: 48
End: 2023-04-03
Payer: MEDICAID

## 2023-04-03 VITALS — RESPIRATION RATE: 18 BRPM | HEIGHT: 72 IN | BODY MASS INDEX: 29.97 KG/M2 | WEIGHT: 221.25 LBS

## 2023-04-03 DIAGNOSIS — G89.29 CHRONIC PAIN OF LEFT KNEE: Primary | ICD-10-CM

## 2023-04-03 DIAGNOSIS — M25.562 CHRONIC PAIN OF LEFT KNEE: Primary | ICD-10-CM

## 2023-04-03 PROCEDURE — 3008F PR BODY MASS INDEX (BMI) DOCUMENTED: ICD-10-PCS | Mod: CPTII,,, | Performed by: ORTHOPAEDIC SURGERY

## 2023-04-03 PROCEDURE — 4010F PR ACE/ARB THEARPY RXD/TAKEN: ICD-10-PCS | Mod: CPTII,,, | Performed by: ORTHOPAEDIC SURGERY

## 2023-04-03 PROCEDURE — 20610 DRAIN/INJ JOINT/BURSA W/O US: CPT | Mod: PBBFAC,LT | Performed by: ORTHOPAEDIC SURGERY

## 2023-04-03 PROCEDURE — 3051F PR MOST RECENT HEMOGLOBIN A1C LEVEL 7.0 - < 8.0%: ICD-10-PCS | Mod: CPTII,,, | Performed by: ORTHOPAEDIC SURGERY

## 2023-04-03 PROCEDURE — 99999 PR PBB SHADOW E&M-EST. PATIENT-LVL IV: ICD-10-PCS | Mod: PBBFAC,,, | Performed by: ORTHOPAEDIC SURGERY

## 2023-04-03 PROCEDURE — 4010F ACE/ARB THERAPY RXD/TAKEN: CPT | Mod: CPTII,,, | Performed by: ORTHOPAEDIC SURGERY

## 2023-04-03 PROCEDURE — 1159F PR MEDICATION LIST DOCUMENTED IN MEDICAL RECORD: ICD-10-PCS | Mod: CPTII,,, | Performed by: ORTHOPAEDIC SURGERY

## 2023-04-03 PROCEDURE — 99024 PR POST-OP FOLLOW-UP VISIT: ICD-10-PCS | Mod: ,,, | Performed by: ORTHOPAEDIC SURGERY

## 2023-04-03 PROCEDURE — 99214 OFFICE O/P EST MOD 30 MIN: CPT | Mod: PBBFAC,PN | Performed by: ORTHOPAEDIC SURGERY

## 2023-04-03 PROCEDURE — 1159F MED LIST DOCD IN RCRD: CPT | Mod: CPTII,,, | Performed by: ORTHOPAEDIC SURGERY

## 2023-04-03 PROCEDURE — 3051F HG A1C>EQUAL 7.0%<8.0%: CPT | Mod: CPTII,,, | Performed by: ORTHOPAEDIC SURGERY

## 2023-04-03 PROCEDURE — 99999 PR PBB SHADOW E&M-EST. PATIENT-LVL IV: CPT | Mod: PBBFAC,,, | Performed by: ORTHOPAEDIC SURGERY

## 2023-04-03 PROCEDURE — 3008F BODY MASS INDEX DOCD: CPT | Mod: CPTII,,, | Performed by: ORTHOPAEDIC SURGERY

## 2023-04-03 PROCEDURE — 99024 POSTOP FOLLOW-UP VISIT: CPT | Mod: ,,, | Performed by: ORTHOPAEDIC SURGERY

## 2023-04-03 RX ORDER — TIZANIDINE 4 MG/1
4 TABLET ORAL NIGHTLY PRN
COMMUNITY
Start: 2023-03-15

## 2023-04-03 RX ORDER — TRAMADOL HYDROCHLORIDE 50 MG/1
50 TABLET ORAL
COMMUNITY
Start: 2023-03-15

## 2023-04-03 RX ORDER — TRIAMCINOLONE ACETONIDE 40 MG/ML
40 INJECTION, SUSPENSION INTRA-ARTICULAR; INTRAMUSCULAR
Status: DISCONTINUED | OUTPATIENT
Start: 2023-04-03 | End: 2023-04-03 | Stop reason: HOSPADM

## 2023-04-03 RX ADMIN — TRIAMCINOLONE ACETONIDE 40 MG: 40 INJECTION, SUSPENSION INTRA-ARTICULAR; INTRAMUSCULAR at 03:04

## 2023-04-03 NOTE — PROGRESS NOTES
Post-op Note    HPI    Javier Enciso is here 3 months s/p the following procedure:     1/9/2023  Arthroscopic left knee chondroplasty  Arthroscopic Plica excision left knee    Overall doing ok.  Swelling is better but still present.  Essentially finished with physical therapy.  Still having pain and stiffness with bending.  Most of her pain is lateral.    Physical Exam:     Patient is alert and oriented no acute distress.   Assistive Device:   None    Left knee:  There is a small to medium-sized left knee suprapatellar joint effusion.  There is tenderness along the tibial tubercle and lateral joint line.  No true medial joint tenderness.  Stable to varus and valgus stress.  Negative Homans sign.  Stable Lachman's exam. No erythema or ecchymosis noted.     Assessment    Javier Enciso is 12 weeks Post-op     Plan:    Treatment options were discussed with her in detail.  She is 3 months status post left knee arthroscopy with chondroplasty.  We went over her arthroscopic pictures again showing patellofemoral disease as well as medial femoral condyle lesion.  She is been struggling mostly with swelling and recurrent effusions.  She is also been struggling with stiffness and pain despite therapy.  I do think a injection at this point would help.  We will do a left knee injection for her today and continue home exercise program as well as compression.  I will see her back in 4 6 weeks or sooner if necessary.            
Clear

## 2023-04-03 NOTE — PROCEDURES
Large Joint Aspiration/Injection: L knee joint    Date/Time: 4/3/2023 3:15 PM  Performed by: Tim Soto MD  Authorized by: Tim Soto MD     Consent Done?:  Yes (Verbal)  Indications:  Pain  Site marked: the procedure site was marked    Timeout: prior to procedure the correct patient, procedure, and site was verified    Local anesthetic:  Lidocaine spray and lidocaine 1% without epinephrine  Anesthetic total (ml):  3      Details:  Needle Size:  22 G  Approach:  Anterolateral  Location:  Knee  Site:  L knee joint  Medications:  40 mg triamcinolone acetonide 40 mg/mL  Patient tolerance:  Patient tolerated the procedure well with no immediate complications

## 2023-06-10 ENCOUNTER — LAB VISIT (OUTPATIENT)
Dept: LAB | Facility: HOSPITAL | Age: 48
End: 2023-06-10
Attending: NURSE PRACTITIONER
Payer: MEDICAID

## 2023-06-10 DIAGNOSIS — E89.0 HISTORY OF THYROIDECTOMY, TOTAL: ICD-10-CM

## 2023-06-10 DIAGNOSIS — E89.0 POSTOPERATIVE HYPOTHYROIDISM: ICD-10-CM

## 2023-06-10 DIAGNOSIS — E13.9 LADA (LATENT AUTOIMMUNE DIABETES MELLITUS IN ADULTS): ICD-10-CM

## 2023-06-10 LAB
ANION GAP SERPL CALC-SCNC: 12 MMOL/L (ref 8–16)
BUN SERPL-MCNC: 14 MG/DL (ref 6–20)
CALCIUM SERPL-MCNC: 9.5 MG/DL (ref 8.7–10.5)
CHLORIDE SERPL-SCNC: 105 MMOL/L (ref 95–110)
CO2 SERPL-SCNC: 23 MMOL/L (ref 23–29)
CREAT SERPL-MCNC: 1 MG/DL (ref 0.5–1.4)
EST. GFR  (NO RACE VARIABLE): >60 ML/MIN/1.73 M^2
ESTIMATED AVG GLUCOSE: 163 MG/DL (ref 68–131)
GLUCOSE SERPL-MCNC: 197 MG/DL (ref 70–110)
HBA1C MFR BLD: 7.3 % (ref 4–5.6)
POTASSIUM SERPL-SCNC: 3.6 MMOL/L (ref 3.5–5.1)
SODIUM SERPL-SCNC: 140 MMOL/L (ref 136–145)
TSH SERPL DL<=0.005 MIU/L-ACNC: 3.65 UIU/ML (ref 0.4–4)

## 2023-06-10 PROCEDURE — 36415 COLL VENOUS BLD VENIPUNCTURE: CPT | Mod: PO | Performed by: NURSE PRACTITIONER

## 2023-06-10 PROCEDURE — 84443 ASSAY THYROID STIM HORMONE: CPT | Performed by: NURSE PRACTITIONER

## 2023-06-10 PROCEDURE — 80048 BASIC METABOLIC PNL TOTAL CA: CPT | Performed by: NURSE PRACTITIONER

## 2023-06-10 PROCEDURE — 83036 HEMOGLOBIN GLYCOSYLATED A1C: CPT | Performed by: NURSE PRACTITIONER

## 2023-06-15 ENCOUNTER — TELEPHONE (OUTPATIENT)
Dept: DIABETES | Facility: CLINIC | Age: 48
End: 2023-06-15
Payer: MEDICAID

## 2023-06-15 NOTE — PROGRESS NOTES
CC:   Chief Complaint   Patient presents with    Diabetes Mellitus       HPI: Javier Enciso is a 48 y.o. female presents for a follow up visit today for the management of T2DM.     She was diagnosed with prediabetes in 2018 and was on Metformin, developed T2DM around 2020. She went to the ER in December 2021 and she was started on insulin therapy.     Family hx of diabetes: mother and father   Hospitalized for diabetes: denies   Insulin therapy: December 2021 4/2022- TOM + 0.04 - slightly positive   4/2022- c-peptide level elevated 5.53  10/2022- repeat c-peptide level  elevated 5.72    No personal or FH of thyroid cancer or personal of pancreatic cancer or pancreatitis.     Hx of thyroidectomy due to cysts- January 2012-- not cancerous. LT4 150 mcg daily   Last TSH WNL --she reports that she is taking her levothyroxine      Our last visit was in March 2023  At that visit we started Jardiance   She reports that she stopped the Jardiance 3 weeks ago due to vaginal irritation.   We had discussed the Omnipod and VGo at the last visit   A1c from 7.6% to 7.3%   She reports that she is interested in the Omnipod and would like to move forward   Reports compliance with insulin                DIABETES COMPLICATIONS: peripheral neuropathy      Diabetes Management Status    ASA: stopped ASA     Statin: Taking--Crestor 40 mg  ACE/ARB: Taking--losartan 50 mg      The 10-year ASCVD risk score (Kizzy HARRISON, et al., 2019) is: 8.6%    Values used to calculate the score:      Age: 48 years      Sex: Female      Is Non- : Yes      Diabetic: Yes      Tobacco smoker: No      Systolic Blood Pressure: 130 mmHg      Is BP treated: Yes      HDL Cholesterol: 38 mg/dL      Total Cholesterol: 139 mg/dL      Screening or Prevention Patient's value Goal Complete/Controlled?   HgA1C Testing and Control   Lab Results   Component Value Date    HGBA1C 7.3 (H) 06/10/2023      Annually/Less than 8% No   Lipid profile :  10/05/2022 Annually Yes   LDL control Lab Results   Component Value Date    LDLCALC 85.8 10/05/2022    Annually/Less than 100 mg/dl  No   Nephropathy screening Lab Results   Component Value Date    LABMICR <5.0 10/05/2022     Lab Results   Component Value Date    PROTEINUA Negative 10/05/2022    Annually No   Blood pressure BP Readings from Last 1 Encounters:   06/16/23 130/80    Less than 140/90 Yes   Dilated retinal exam Most Recent Eye Exam Date: Not Found Annually No   Foot exam   : 02/21/2022 Annually No       CURRENT A1C:    Hemoglobin A1C   Date Value Ref Range Status   06/10/2023 7.3 (H) 4.0 - 5.6 % Final     Comment:     ADA Screening Guidelines:  5.7-6.4%  Consistent with prediabetes  >or=6.5%  Consistent with diabetes    High levels of fetal hemoglobin interfere with the HbA1C  assay. Heterozygous hemoglobin variants (HbS, HgC, etc)do  not significantly interfere with this assay.   However, presence of multiple variants may affect accuracy.     03/07/2023 7.6 (H) 4.0 - 5.6 % Final     Comment:     ADA Screening Guidelines:  5.7-6.4%  Consistent with prediabetes  >or=6.5%  Consistent with diabetes    High levels of fetal hemoglobin interfere with the HbA1C  assay. Heterozygous hemoglobin variants (HbS, HgC, etc)do  not significantly interfere with this assay.   However, presence of multiple variants may affect accuracy.     10/05/2022 6.9 (H) 4.0 - 5.6 % Final     Comment:     ADA Screening Guidelines:  5.7-6.4%  Consistent with prediabetes  >or=6.5%  Consistent with diabetes    High levels of fetal hemoglobin interfere with the HbA1C  assay. Heterozygous hemoglobin variants (HbS, HgC, etc)do  not significantly interfere with this assay.   However, presence of multiple variants may affect accuracy.         GOAL A1C: 6.5% without hypoglycemia       DM MEDICATIONS USED IN THE PAST: Lantus, Novolog   Metformin -- wasn't working   dexcom   Ozempic -- nausea       CURRENT DIABETES MEDICATIONS: Lantus 20 units  nightly (9PM) and  Novolog 5 units  TID + correction scale, 150 +1   She stopped the Jardiance   Insulin:  pens.    Missed doses: off jardiance   Rarely missing the insulin         BLOOD GLUCOSE MONITORING:   Sensor type: Dexcom G6   Average BG readin  Time in range: 71%    Estimated A1c is 7.2 %   27% high and 1% very high   <1% low and 0% very low   Site change:  q10 days    Two weeks prior the average was 163  in target range 71% of the time with an estimated A1c of 7.2%      Supplies from [facility] or diabetes management supply??     Sensor was downloaded in clinic today and reviewed with patient.   Please see attached document for download.       HYPOGLYCEMIA: rarely   <1% low 0% very low   0% low - 2 weeks prior   Glucagon kit: Baqsimi    Medic alert bracelet: denies       MEALS: eating 3 meals per day   BF: cup of coffee, oatmeal with waffle or wheat bread with egg- or [employer]   Lunch: job pays for lunch--- fast food- or meat with veggie - or Burger and No fries   Dinner: veggie with meat -- rarely does rice -- spinach, Kinyarwanda cut green beans or broccoli   Snack: rarely   Drinks: -- previously was drinking cranberry juice -- she was doing sugary beverage until recently   So she stopped the juices   Water makes her own tea with sugar substitute          CURRENT EXERCISE:  PT 2 times per week for her knee       Review of Systems  Review of Systems   Constitutional:  Negative for appetite change, fatigue and unexpected weight change.   HENT:  Negative for trouble swallowing.    Eyes:  Negative for visual disturbance.   Respiratory:  Negative for shortness of breath.    Cardiovascular:  Negative for chest pain.   Gastrointestinal:  Negative for nausea.   Endocrine: Negative for polydipsia, polyphagia and polyuria.   Genitourinary:         No Nocturia    Musculoskeletal:  Positive for arthralgias (left knee). Negative for back pain.   Skin:  Negative for wound.   Neurological:  Positive for numbness.      Physical Exam   Physical Exam  Vitals and nursing note reviewed.   Constitutional:       Appearance: She is well-developed.      Comments: Overweight female   HENT:      Head: Normocephalic and atraumatic.      Right Ear: External ear normal.      Left Ear: External ear normal.      Nose: Nose normal.   Neck:      Thyroid: No thyromegaly.      Trachea: No tracheal deviation.   Cardiovascular:      Rate and Rhythm: Normal rate and regular rhythm.      Heart sounds: No murmur heard.  Pulmonary:      Effort: Pulmonary effort is normal. No respiratory distress.      Breath sounds: Normal breath sounds.   Abdominal:      Palpations: Abdomen is soft.      Tenderness: There is no abdominal tenderness.      Hernia: No hernia is present.   Musculoskeletal:      Cervical back: Normal range of motion and neck supple.   Skin:     General: Skin is warm and dry.      Capillary Refill: Capillary refill takes less than 2 seconds.      Findings: No rash.      Comments: Injection sites and dexcom sites are normal appearing. No lipo hypertropthy or atrophy     Neurological:      Mental Status: She is alert and oriented to person, place, and time.      Cranial Nerves: No cranial nerve deficit.   Psychiatric:         Behavior: Behavior normal.         Judgment: Judgment normal.       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   She would like to just work with insulin for now       -- Medication Changes:     Adjust the Lantus to 22 units nightly -- same time every night     Adjust your Novolog to 6 units with meals   With using correction scale: before you eat only      150-200: +1 unit  201-250: +2 units  251-300: +3 units  301-350: +4 units  >350: +5 units      Continue to use the dexcom       Insulin pump settings:  Basal: 0.8 units/hr   ICR: 1:10-- set doses -- 5-6 units with meals   ISF: 1:45  Target: 120  IOB: 3.5 hours     -- Reviewed goals of therapy are to get the best control we can without hypoglycemia.  -- Reviewed patient's current insulin regimen. Clarified proper insulin dose and timing in relation to meals, etc. Insulin injection sites and proper rotation instructed.    -- Advised frequent self blood glucose monitoring.  Patient encouraged to document glucose results and bring them to every clinic visit.  Continue to use the dexcom personal CGM-- dexcom G6    -- Hypoglycemia precautions discussed. Instructed on precautions before driving.    -- Call for Bg repeatedly < 70 or > 180.   -- Close adherence to lifestyle changes recommended.   -- Periodic follow ups for eye evaluations, foot care and dental care suggested.  -- diabetes education-- omnipod 5 start + dexcom   See rylan in 7-10 days for omnipod download and review          Patient has diabetes mellitus and manages diabetes with intensive insulin regimen and uses prandial and basal insulin daily  Patient requires a therapeutic CGM and is willing to use therapeutic CGM for the necessary frequent adjustments of insulin therapy.  I have completed an in-person visit during the previous 6 months and will continue to have in-person visits every 6 months to assess adherence to their CGM regimen and diabetes treatment plan.  Due to COVID pandemic and need for remote monitoring this tool is medically necessary            Type 2 diabetes mellitus with diabetic polyneuropathy, with long-term current use of insulin  Optimize BG readings.   See above.    she is following with an external podiatrist    Educated patient to check feet daily for any foreign objects and/or wounds. Discussed with patient the importance of  wearing appropriate footwear at all times, not to walk barefoot ever, and to check shoes before putting them on feet. Instructed patient to keep feet dry by regularly changing shoes and socks and drying feet after baths and exercises. Also, instructed patient to report any new lesions, discolorations, or swelling to a healthcare professional.        Primary hypertension  BP goal is < 140/90.   Tolerating ARB  Controlled   Blood pressure goals discussed with patient      Pure hypercholesterolemia  On statin per ADA recommendations  LDL goal < 100. LDL at goal. LFTs WNL. Continue statin.         Overweight (BMI 25.0-29.9)  Body mass index is 29.1 kg/m².  Increases insulin resistance.   Discussed DM diet and exercise.       Postoperative hypothyroidism  TSH WNL, on LT4 150 mcg, reinforced take by itself on empty stomach, first thing in the morning and wait at least 30-60 minutes to eat, drink, or take other medications.      Seen by General Endocrine in August of 2022-- Dr. Kumar       History of thyroidectomy, total  On LT4   Reports cysts that were removed were  Benign  Evaluated by General Endocrine in August of 2022-- Dr. NAHUN Santos         I spent a total of 30 minutes on the day of the visit.This includes face to face time and non-face to face time preparing to see the patient (eg, review of tests), obtaining and/or reviewing separately obtained history, documenting clinical information in the electronic or other health record, independently interpreting results and communicating results to the patient/family/caregiver, or care coordinator.        Follow up in about 3 months (around 9/16/2023).  Schedule with with rylan for Omnipod 5 start + dexcom   See rylan 7-10 day after pump start for follow up with rylan   Follow up with me in 3 months   Labs prior to follow up -- labs in [place]   Omnipod PA please         Orders Placed This Encounter   Procedures    Hemoglobin A1C     Standing Status:   Future     Standing  Expiration Date:   12/16/2024    Comprehensive Metabolic Panel     Standing Status:   Future     Standing Expiration Date:   12/16/2024    Lipid Panel     Standing Status:   Future     Standing Expiration Date:   12/16/2024    Microalbumin/Creatinine Ratio, Urine     Standing Status:   Future     Standing Expiration Date:   12/16/2024     Order Specific Question:   Specimen Source     Answer:   Urine    TSH     Standing Status:   Future     Standing Expiration Date:   12/16/2024    CBC Auto Differential     Standing Status:   Future     Standing Expiration Date:   12/16/2024    Ambulatory referral/consult to Diabetes Education     Standing Status:   Future     Standing Expiration Date:   12/16/2024     Referral Priority:   Routine     Referral Type:   Consultation     Referral Reason:   Specialty Services Required     Requested Specialty:   Diabetes     Number of Visits Requested:   1     Expiration Date:   6/16/2024       Recommendations were discussed with the patient in detail  The patient verbalized understanding and agrees with the plan outlined as above.     This note was partly generated with [employer] voice recognition software. I apologize for any possible typographical errors.

## 2023-06-16 ENCOUNTER — OFFICE VISIT (OUTPATIENT)
Dept: DIABETES | Facility: CLINIC | Age: 48
End: 2023-06-16
Payer: MEDICAID

## 2023-06-16 VITALS
SYSTOLIC BLOOD PRESSURE: 130 MMHG | WEIGHT: 214.63 LBS | DIASTOLIC BLOOD PRESSURE: 80 MMHG | HEIGHT: 72 IN | OXYGEN SATURATION: 98 % | BODY MASS INDEX: 29.07 KG/M2 | HEART RATE: 83 BPM

## 2023-06-16 DIAGNOSIS — Z79.4 TYPE 2 DIABETES MELLITUS WITH DIABETIC POLYNEUROPATHY, WITH LONG-TERM CURRENT USE OF INSULIN: ICD-10-CM

## 2023-06-16 DIAGNOSIS — E11.42 TYPE 2 DIABETES MELLITUS WITH DIABETIC POLYNEUROPATHY, WITH LONG-TERM CURRENT USE OF INSULIN: ICD-10-CM

## 2023-06-16 DIAGNOSIS — E89.0 HISTORY OF THYROIDECTOMY, TOTAL: ICD-10-CM

## 2023-06-16 DIAGNOSIS — E78.00 PURE HYPERCHOLESTEROLEMIA: Chronic | ICD-10-CM

## 2023-06-16 DIAGNOSIS — Z71.9 HEALTH EDUCATION/COUNSELING: ICD-10-CM

## 2023-06-16 DIAGNOSIS — I10 PRIMARY HYPERTENSION: Chronic | ICD-10-CM

## 2023-06-16 DIAGNOSIS — E13.9 LADA (LATENT AUTOIMMUNE DIABETES MELLITUS IN ADULTS): Primary | ICD-10-CM

## 2023-06-16 DIAGNOSIS — E66.3 OVERWEIGHT (BMI 25.0-29.9): ICD-10-CM

## 2023-06-16 DIAGNOSIS — E89.0 POSTOPERATIVE HYPOTHYROIDISM: ICD-10-CM

## 2023-06-16 PROCEDURE — 3079F DIAST BP 80-89 MM HG: CPT | Mod: CPTII,,, | Performed by: NURSE PRACTITIONER

## 2023-06-16 PROCEDURE — 95251 CONT GLUC MNTR ANALYSIS I&R: CPT | Mod: ,,, | Performed by: NURSE PRACTITIONER

## 2023-06-16 PROCEDURE — 3079F PR MOST RECENT DIASTOLIC BLOOD PRESSURE 80-89 MM HG: ICD-10-PCS | Mod: CPTII,,, | Performed by: NURSE PRACTITIONER

## 2023-06-16 PROCEDURE — 1160F RVW MEDS BY RX/DR IN RCRD: CPT | Mod: CPTII,,, | Performed by: NURSE PRACTITIONER

## 2023-06-16 PROCEDURE — 3051F HG A1C>EQUAL 7.0%<8.0%: CPT | Mod: CPTII,,, | Performed by: NURSE PRACTITIONER

## 2023-06-16 PROCEDURE — 99214 PR OFFICE/OUTPT VISIT, EST, LEVL IV, 30-39 MIN: ICD-10-PCS | Mod: S$PBB,,, | Performed by: NURSE PRACTITIONER

## 2023-06-16 PROCEDURE — 99214 OFFICE O/P EST MOD 30 MIN: CPT | Mod: S$PBB,,, | Performed by: NURSE PRACTITIONER

## 2023-06-16 PROCEDURE — 3075F SYST BP GE 130 - 139MM HG: CPT | Mod: CPTII,,, | Performed by: NURSE PRACTITIONER

## 2023-06-16 PROCEDURE — 4010F ACE/ARB THERAPY RXD/TAKEN: CPT | Mod: CPTII,,, | Performed by: NURSE PRACTITIONER

## 2023-06-16 PROCEDURE — 1159F MED LIST DOCD IN RCRD: CPT | Mod: CPTII,,, | Performed by: NURSE PRACTITIONER

## 2023-06-16 PROCEDURE — 3008F BODY MASS INDEX DOCD: CPT | Mod: CPTII,,, | Performed by: NURSE PRACTITIONER

## 2023-06-16 PROCEDURE — 3051F PR MOST RECENT HEMOGLOBIN A1C LEVEL 7.0 - < 8.0%: ICD-10-PCS | Mod: CPTII,,, | Performed by: NURSE PRACTITIONER

## 2023-06-16 PROCEDURE — 99999 PR PBB SHADOW E&M-EST. PATIENT-LVL V: ICD-10-PCS | Mod: PBBFAC,,, | Performed by: NURSE PRACTITIONER

## 2023-06-16 PROCEDURE — 1160F PR REVIEW ALL MEDS BY PRESCRIBER/CLIN PHARMACIST DOCUMENTED: ICD-10-PCS | Mod: CPTII,,, | Performed by: NURSE PRACTITIONER

## 2023-06-16 PROCEDURE — 3008F PR BODY MASS INDEX (BMI) DOCUMENTED: ICD-10-PCS | Mod: CPTII,,, | Performed by: NURSE PRACTITIONER

## 2023-06-16 PROCEDURE — 95251 PR GLUCOSE MONITOR, 72 HOUR, PHYS INTERP: ICD-10-PCS | Mod: ,,, | Performed by: NURSE PRACTITIONER

## 2023-06-16 PROCEDURE — 3075F PR MOST RECENT SYSTOLIC BLOOD PRESS GE 130-139MM HG: ICD-10-PCS | Mod: CPTII,,, | Performed by: NURSE PRACTITIONER

## 2023-06-16 PROCEDURE — 1159F PR MEDICATION LIST DOCUMENTED IN MEDICAL RECORD: ICD-10-PCS | Mod: CPTII,,, | Performed by: NURSE PRACTITIONER

## 2023-06-16 PROCEDURE — 99999 PR PBB SHADOW E&M-EST. PATIENT-LVL V: CPT | Mod: PBBFAC,,, | Performed by: NURSE PRACTITIONER

## 2023-06-16 PROCEDURE — 99215 OFFICE O/P EST HI 40 MIN: CPT | Mod: PBBFAC,PN | Performed by: NURSE PRACTITIONER

## 2023-06-16 PROCEDURE — 4010F PR ACE/ARB THEARPY RXD/TAKEN: ICD-10-PCS | Mod: CPTII,,, | Performed by: NURSE PRACTITIONER

## 2023-06-16 RX ORDER — INSULIN ASPART 100 [IU]/ML
INJECTION, SOLUTION INTRAVENOUS; SUBCUTANEOUS
Qty: 30 ML | Refills: 6 | Status: SHIPPED | OUTPATIENT
Start: 2023-06-16 | End: 2024-02-05

## 2023-06-16 RX ORDER — INSULIN PMP CART,AUT,G6/7,CNTR
1 EACH SUBCUTANEOUS
Qty: 1 EACH | Refills: 0 | Status: SHIPPED | OUTPATIENT
Start: 2023-06-16 | End: 2024-03-08

## 2023-06-16 RX ORDER — INSULIN GLARGINE 100 [IU]/ML
22 INJECTION, SOLUTION SUBCUTANEOUS NIGHTLY
Qty: 9 ML | Refills: 6
Start: 2023-06-16 | End: 2023-09-11

## 2023-06-16 RX ORDER — INSULIN PMP CART,AUT,G6/7,CNTR
1 EACH SUBCUTANEOUS
Qty: 2 EACH | Refills: 11 | Status: SHIPPED | OUTPATIENT
Start: 2023-06-16 | End: 2024-03-08 | Stop reason: SDUPTHER

## 2023-06-16 RX ORDER — INSULIN ASPART 100 [IU]/ML
INJECTION, SOLUTION INTRAVENOUS; SUBCUTANEOUS
Qty: 9 ML | Refills: 6
Start: 2023-06-16 | End: 2023-09-11

## 2023-06-16 NOTE — ASSESSMENT & PLAN NOTE
Body mass index is 29.1 kg/m².  Increases insulin resistance.   Discussed DM diet and exercise.

## 2023-06-16 NOTE — ASSESSMENT & PLAN NOTE
Uncontrolled   + TOM   c-peptide level WNL X2 now   Can treat as T2DM   Dexcom is helping with self awareness and self accountability  Unable to tolerate Ozempic due to extreme nausea  Unable to tolerate Jardiance due to yeast infections.   She would like to just work with insulin for now       -- Medication Changes:     Adjust the Lantus to 22 units nightly -- same time every night     Adjust your Novolog to 6 units with meals   With using correction scale: before you eat only     150-200: +1 unit  201-250: +2 units  251-300: +3 units  301-350: +4 units  >350: +5 units      Continue to use the dexcom       Insulin pump settings:  Basal: 0.8 units/hr   ICR: 1:10-- set doses -- 5-6 units with meals   ISF: 1:45  Target: 120  IOB: 3.5 hours     -- Reviewed goals of therapy are to get the best control we can without hypoglycemia.  -- Reviewed patient's current insulin regimen. Clarified proper insulin dose and timing in relation to meals, etc. Insulin injection sites and proper rotation instructed.    -- Advised frequent self blood glucose monitoring.  Patient encouraged to document glucose results and bring them to every clinic visit.  Continue to use the dexcom personal CGM-- dexcom G6    -- Hypoglycemia precautions discussed. Instructed on precautions before driving.    -- Call for Bg repeatedly < 70 or > 180.   -- Close adherence to lifestyle changes recommended.   -- Periodic follow ups for eye evaluations, foot care and dental care suggested.  -- diabetes education-- omnipod 5 start + dexcom   See rylan in 7-10 days for omnipod download and review          Patient has diabetes mellitus and manages diabetes with intensive insulin regimen and uses prandial and basal insulin daily  Patient requires a therapeutic CGM and is willing to use therapeutic CGM for the necessary frequent adjustments of insulin therapy.  I have completed an in-person visit during the previous 6 months and will continue to have in-person  visits every 6 months to assess adherence to their CGM regimen and diabetes treatment plan.  Due to COVID pandemic and need for remote monitoring this tool is medically necessary

## 2023-06-16 NOTE — PATIENT INSTRUCTIONS
Lantus 22 units     Novolog 6 units with meals + correction     With using correction scale:    150-200: +1 unit  201-250: +2 units  251-300: +3 units  301-350: +4 units  >350: +5 units

## 2023-07-20 ENCOUNTER — CLINICAL SUPPORT (OUTPATIENT)
Dept: DIABETES | Facility: CLINIC | Age: 48
End: 2023-07-20
Payer: MEDICAID

## 2023-07-20 DIAGNOSIS — E13.9 LADA (LATENT AUTOIMMUNE DIABETES MELLITUS IN ADULTS): ICD-10-CM

## 2023-07-20 PROCEDURE — 98960 PR SELF-MGMT EDUC & TRAIN, 1 PT, EA 30 MIN: ICD-10-PCS | Mod: 95,,, | Performed by: DIETITIAN, REGISTERED

## 2023-07-20 PROCEDURE — 98960 EDU&TRN PT SELF-MGMT NQHP 1: CPT | Mod: 95,,, | Performed by: DIETITIAN, REGISTERED

## 2023-07-28 ENCOUNTER — CLINICAL SUPPORT (OUTPATIENT)
Dept: DIABETES | Facility: CLINIC | Age: 48
End: 2023-07-28
Payer: MEDICAID

## 2023-07-28 DIAGNOSIS — E13.9 LADA (LATENT AUTOIMMUNE DIABETES MELLITUS IN ADULTS): Primary | ICD-10-CM

## 2023-07-28 PROCEDURE — 95251 CONT GLUC MNTR ANALYSIS I&R: CPT | Mod: NDTC,,, | Performed by: NURSE PRACTITIONER

## 2023-07-28 PROCEDURE — 95251 PR GLUCOSE MONITOR, 72 HOUR, PHYS INTERP: ICD-10-PCS | Mod: NDTC,,, | Performed by: NURSE PRACTITIONER

## 2023-07-28 NOTE — PROGRESS NOTES
Diabetes Care Specialist Progress Note  Author: Kati Espinosa RD, CDE  Date: 7/28/2023    Program Intake  Reason for Diabetes Program Visit:: Intervention  Type of Intervention:: Individual  Individual: Device Training  Device Training: Insulin Pump Start  Current diabetes risk level:: low (HgbA1c 7.3)  In the last 12 months, have you:: none  Permission to speak with others about care:: no    Lab Results   Component Value Date    HGBA1C 7.3 (H) 06/10/2023     Diabetes Care Specialist Virtual Visit Note       The patient location is: home in [place], [place]  The chief complaint leading to consultation is: Diabetes  Visit type: audiovisual  Total time spent with patient: 65 min   Each patient to whom he or she provides medical services by telemedicine is:  (1) informed of the relationship between the physician and patient and the respective role of any other health care provider with respect to management of the patient; and (2) notified that he or she may decline to receive medical services by telemedicine and may withdraw from such care at any time.      Clinical    Patient Health Rating  Compared to other people your age, how would you rate your health?: Good    Problem Review  Reviewed Problem List with Patient: yes  Active comorbidities affecting diabetes self-care.: yes  Comorbidities: Neuropathy  Reviewed health maintenance: yes    Clinical Assessment  Current Diabetes Treatment: Oral Medication, Insulin (jardiance, lantus, novolog, baqsimi)  Have you ever experienced hypoglycemia (low blood sugar)?: yes  In the last month, how often have you experienced low blood sugar?: other (see comments)  Are you able to tell when your blood sugar is low?: Yes  What symptoms do you experience?: Shaky  Have you ever been hospitalized because your blood sugar was too low?: no  How do you treat hypoglycemia (low blood sugar)?: 1/2 can soda/fruit juice, 4 glucose tablets, 5-6 pieces of hard candy  Have you ever experienced  hyperglycemia (high blood sugar)?: yes  In the last month, how often have you experienced high blood sugar?: more than once a week  Are you able to tell when your blood sugar is high?: No (comment)  Have you ever been hospitalized because your blood sugar was high?: no    Medication Information  How do you obtain your medications?: Pay someone to , Patient drives  How many days a week do you miss your medications?: Never  Do you use a pill box or medication chart to help you manage your medications?: No  Do you sometimes have difficulty refilling your medications?: No  Medication adherence impacting ability to self-manage diabetes?: No    Labs  Do you have regular lab work to monitor your medications?: Yes  Type of Regular Lab Work: A1c, Cholesterol, Microalbumin, CBC, BMP  Where do you get your labs drawn?: [facility]  Lab Compliance Barriers: No    Nutritional Status  Diet: Regular  Meal Plan 24 Hour Recall: Breakfast, Lunch, Dinner, Snack  Meal Plan 24 Hour Recall - Breakfast: cup of coffee, oatmeal with waffle or wheat bread with egg, or Yann [employer] breakfast  Meal Plan 24 Hour Recall - Lunch: fast food usually or meat with veggie or a burger without fries  Meal Plan 24 Hour Recall - Dinner: usually a veggie with meat, rarely will eat rice, eats mostly spinach, [place]og[place]ese cut green beans or broccoli  Meal Plan 24 Hour Recall - Snack: rarely, Drinks: quit drinking cranberry juice and sugary beverages, now drinks water makes and tea with sugar substitute  Change in appetite?: No  Dentation:: Intact  Recent Changes in Weight: No Recent Weight Change  Current nutritional status an area of need that is impacting patient's ability to self-manage diabetes?: No    Additional Social History    Support  Does anyone support you with your diabetes care?: yes  Who supports you?: self, family member  Who takes you to your medical appointments?: self  Does the current support meet the patient's needs?: Yes  Is Support an area  impacting ability to self-manage diabetes?: No    Access to Mass Media & Technology  Does the patient have access to any of the following devices or technologies?: Smart phone  Media or technology needs impacting ability to self-manage diabetes?: No    Cognitive/Behavioral Health  Alert and Oriented: Yes  Difficulty Thinking: No  Requires Prompting: No  Requires assistance for routine expression?: No  Cognitive or behavioral barriers impacting ability to self-manage diabetes?: No    Culture/Church  Culture or Church beliefs that may impact ability to access healthcare: No    Communication  Language preference: English  Hearing Problems: No  Vision Problems: No  Communication needs impacting ability to self-manage diabetes?: No    Health Literacy  Preferred Learning Method: Face to Face  How often do you need to have someone help you read instructions, pamphlets, or written material from your doctor or pharmacy?: Never  Health literacy needs impacting ability to self-manage diabetes?: No      Diabetes Self-Management Skills Assessment    Diabetes Disease Process/Treatment Options  Patient/caregiver able to state what happens when someone has diabetes.: yes  Patient/caregiver knows what type of diabetes they have.: yes  Diabetes Type : Type II  Patient/caregiver able to identify at least three signs and symptoms of diabetes.: yes  Identified signs and symptoms:: fatigue, increased thirst, blurred vision  Patient able to identify at least three risk factors for diabetes.: yes  Identified risk factors:: reduced activity, being overweight, family history  Diabetes Disease Process/Treatment Options: Skills Assessment Completed: Yes  Assessment indicates:: Adequate understanding  Area of need?: No    Nutrition/Healthy Eating  Challenges to healthy eating:: portion control, eating out, going to parties  Method of carbohydrate measurement:: carb counting/reading labels  Patient can identify foods that impact blood  sugar.: yes  Patient-identified foods:: starches (bread, pasta, rice, cereal), soda, sweets, starchy vegetables (corn, peas, beans), fruit/fruit juice, milk, yogurt  Nutrition/Healthy Eating Skills Assessment Completed:: Yes  Assessment indicates:: Adequate understanding  Area of need?: No    Physical Activity/Exercise  Patient's daily activity level:: lightly active  Patient formally exercises outside of work.: no  Reasons for not exercising:: time constraints  Patient can identify forms of physical activity.: yes  Stated forms of physical activity:: moving to burn calories  Patient can identify reasons why exercise/physical activity is important in diabetes management.: yes  Identified reasons:: tones muscles, other (see comments)  Physical Activity/Exercise Skills Assessment Completed: : Yes  Assessment indicates:: Adequate understanding  Area of need?: No    Medications  Patient is able to describe current diabetes management routine.: yes  Diabetes management routine:: insulin, diet  Patient is able to identify current diabetes medications, dosages, and appropriate timing of medications.: yes  Patient understands the purpose of the medications taken for diabetes.: yes  Patient reports problems or concerns with current medication regimen.: no  Medication Skills Assessment Completed:: Yes  Assessment indicates:: Adequate understanding  Area of need?: No    Home Blood Glucose Monitoring  Patient states that blood sugar is checked at home daily.: yes  Monitoring Method:: personal continuous glucose monitor  Personal CGM type:: Dexcom  Patient is able to use personal CGM appropriately.: yes  CGM Report reviewed?: yes  Home Blood Glucose Monitoring Skills Assessment Completed: : Yes  Assessment indicates:: Adequate understanding  Area of need?: No    Acute Complications  Patient is able to identify types of acute complications: Yes  Patient Identified:: Hypoglycemia, Hyperglycemia  Patient is able to state the basic  meaning of hypoglycemia?: Yes  Able to state the blood sugar range for hypoglycemia?: yes  Patient stated range:: <70  Patient can identify general symptoms of hypoglycemia: yes  Patient identified:: shakiness  Able to state proper treatment of hypoglycemia?: yes  Patient identified:: 1/2 can soda/fruit juice, other (see comments)  Patient is able to state the basic meaning of hyperglycemia?: Yes  Able to state the blood sugar range for hyperglycemia?: yes  Patient stated range:: >200  Patient able to state proper treatment of hyperglycemia?: yes  Patient identified:: take medication as recommended  Patient able to verbalize sick day plan?: no  Acute Complications Skills Assessment Completed: : Yes  Assessment indicates:: Instruction Needed  Area of need?: Yes    Chronic Complications  Patient can identify major chronic complications of diabetes.: yes  Stated chronic complications:: kidney disease, neuropathy/nerve damage, retinopathy, stroke, heart disease/heart attack  Patient can identify ways to prevent or delay diabetes complications.: yes  Stated ways to prevent complications:: healthy eating and regular activity, maintaining optimal blood glucose control, controlling blood sugar  Patient is aware that having diabetes increases risk of heart disease?: Yes  Patient is aware that heart disease is the leading cause of death and disability in people with diabetes?: Yes  Patient able to state risk factors for heart disease?: Yes  Patient stated risk factors for heart disease:: High blood pressure, High cholesterol, Having diabetes, Diet  Patient is taking statin?: Yes  Chronic Complications Skills Assessment Completed: : Yes  Assessment indicates:: Adequate understanding  Area of need?: No    Psychosocial/Coping  Patient can identify ways of coping with chronic disease.: yes  Patient-stated ways of coping with chronic disease:: counseling/actively seeing behavioral professional  Psychosocial/Coping Skills  Assessment Completed: : Yes  Assessment indicates:: Adequate understanding  Area of need?: No      Diabetes Self Support Plan    Diabetes Self-Management Support Plan  Stress management:: family, friends  Review status:: Patient has selected and agrees to support plan., Patient was provided Diabetes Self-Management Support Plan document that includes support options.    Assessment Summary and Plan    Based on today's diabetes care assessment, the following areas of need were identified:      Social 7/20/2023   Support No   Access to Mass Media/Tech No   Cognitive/Behavioral Health No   Culture/Advent No   Communication No   Health Literacy No        Clinical 7/20/2023   Medication Adherence No   Lab Compliance No   Nutritional Status No        Diabetes Self-Management Skills 7/20/2023   Diabetes Disease Process/Treatment Options No   Nutrition/Healthy Eating No   Physical Activity/Exercise No   Medication No, starting the omnipod 5  Insulin Pump Education  Discussed OmniPod 5.   Explained SmartAdjust Technology - Every 5 minutes, the system automatically increases, decreases, or pauses insulin delivery based on your customized target glucose--helping to protect against highs and lows, day and night.  Automated Mode vs Manual Mode vs Limited Automated Mode  Downloading the Kavon and ProConnect ([facility])  Pod and Dexcom need to be in line of site of each other  Inputting Dexcom Transmitter Code into Kavon or Handheld Device  Dexcom communicates with the pod directly and the Dexcom G6 kavon  Activity Feature  Changing the target and the length of time insulin is on board will adjust automated mode  Changing ICR/Basal Rates/ISF will only change setting in manual mode  SmartBolus Calculator - incorporates CGM data and trend data  Setup podder central account and linked [employer] account  Patient educated on insulin pump therapy, what a basal rate and bolus dose are, when to change pod, demonstrated how to prepare the  syringe and pod for use with the pump, discussed ease of usage, basal and bolus, carbohydrate to insulin ratio, correction factors, approved areas to insert set, carbohydrate counting, error messages, explained the basal rates - patient will receive a little bit of insulin throughout 24 hours, bolus dose are delivered delivered by the inputting grams of carbohydrates, explained that patient will be counting carbohydrates and checking blood glucose before each meal, discussed when to change the pod, demonstrated how to fill the pod with insulin, how to apply pod and insert the needle, explained and demonstrated how to safely retract the needle and remove the pod, discussed ease of usage, carbohydrate counting, demonstrated applying device, inserting needle, administering bolus insulin, retracting needle, and removing/disposing of pod. Patient states understanding and performed return demonstration. Patient started first pod in office. Patient provided with written literature and CDE contact information       Insulin pump settings:  Basal: 0.8 units/hr   ICR: 1:10-- set doses -- 5-6 units with meals   ISF: 1:45  Target: 120  IOB: 3.5 hours    Home Blood Glucose Monitoring No   Acute Complications Yes, will address at follow-up visit, starting the omnipod 5 at present   Chronic Complications No   Psychosocial/Coping No          Today's interventions were provided through individual discussion, instruction, and written materials were provided.      Patient verbalized understanding of instruction and written materials.  Pt was able to return back demonstration of instructions today. Patient understood key points, needs reinforcement and further instruction.     Diabetes Self-Management Care Plan:    Today's Diabetes Self-Management Care Plan was developed with Javier's input. Javier has agreed to work toward the following goal(s) to improve his/her overall diabetes control.      Care Plan: Diabetes Management   Updates  made since 6/28/2023 12:00 AM        Problem: Acute Complications         Goal: Patient agrees to identify and manage signs and symptoms of high/low blood sugar (hyper/hypoglycemia) by keeping a log of events and using proper treatment. Completed 7/20/2023   Start Date: 4/14/2022   Expected End Date: 5/14/2022   This Visit's Progress: Met   Recent Progress: Deferred   Priority: High   Barriers: No Barriers Identified   Note:    Hyperglycemia  ABC's of Diabetes   Discussed importance of A1c less than 6.0 to reduce risk of micro and macro complications, controlled Blood Pressure 130/80 and Cholesterol Lab Values--Total Cholesterol <200mg, LDL < 100, HDL >45 men and >50 women, Triglycerides <150mg for prevention heart disease, heart attack, stroke.  how to use a glucometer  reviewed understanding diabetes distress  reviewed current level and goal level for HgbA1c, blood glucose, microalbumin, and lipids  reviewed signs/symptoms of hyperglycemia  reviewed what level blood sugar is considered high   reviewed at what level to contact the doctor and/or go to the emergency room.   Reviewed what patient can do to decrease blood sugar (ie drink more water, exercise, take medication as prescribed, monitor blood glucose)     Hypoglycemia  Reviewed blood glucose goals, prevention, detection, and treatment of hypoglycemia, and when to contact the clinic.  reviewed signs/symptoms of hypoglycemia  reviewed what level blood sugar is considered low   reviewed at what level to contact the doctor and/or go to the emergency room.   Reviewed what patient can do to increase blood sugar (ie drink juice or ½ can soda, eat 5-6 pieces of candy, 4 glucose tablets, 1 tbsp sugar or honey, glucagon)  Reviewed when glucagon should be used  Reviewed how to use glucagon device (injections and inhaled)         Problem: Chronic Complications         Goal: Patient agrees to have the following diabetes ruben maintenance screenings/appointments eye exam  completed in the next 6 months.    Start Date: 7/20/2023   Expected End Date: 1/28/2024   This Visit's Progress: On track   Recent Progress: Deferred   Priority: Medium   Barriers: No Barriers Identified   Note:    Diabetes Care Schedule   A1c every 3 to 6 months  Lipid Panel every year  Microalbumin (urine test) once per year  Comprehensive Foot Exam once per year  Dilated Eye Exam at least once per year  Dental exam every 6 months  Flu Vaccination yearly   Shingles and Pneumonia Vaccination as recommended by physician      Reviewed diabetes care schedule, foot care guidelines, diabetes and retinopathy screening, s/s hypo and hyperglycemia, long/short term complication of uncontrolled DM, importance of compliance with treatment plan    Reviewed risk of heart disease  High blood pressure  High cholesterol  Diet  Limited activity  Medication non-adherence  Having diabetes    Reviewed that heart disease is the leading cause of death in people with uncontrolled diabetes  Reviewed ways to prevent complications:  Avoid smoking and other types of tobacco  Checking feet daily and having routine comprehensive foot exams  Controlling blood sugar  Controlling cholesterol and triglycerides  Having regular diabetic eye exams  Healthy eating and regular activity  Maintaining optimal blood glucose control         Task: Discussed current A1c, Blood Pressure, and Cholesterol results (ABCs of Diabetes) and reviewed targets of each. Completed 7/28/2023        Task: Discussed importance of annual microalbumin urine test to monitor kidney function. Completed 7/28/2023        Task: Discussed importance of annual dilated eye exam for prevention of retinopathy. Completed 7/28/2023          Follow Up Plan     Follow up in about 1 week (around 7/27/2023) for Insulin Pump Upload, Personal CGM Upload.    Today's care plan and follow up schedule was discussed with patient.  Javier verbalized understanding of the care plan, goals, and agrees  to follow up plan.        The patient was encouraged to communicate with his/her health care provider/physician and care team regarding his/her condition(s) and treatment.  I provided the patient with my contact information today and encouraged to contact me via phone or [facility] Patient Portal as needed.     Length of Visit   Total Time: 65 Minutes

## 2023-07-28 NOTE — PROGRESS NOTES
Continuous Glucose Sensor Report of Personal Device    Javier Enciso is a 48 y.o.female with JIMENA     Interpretation of data from Dexcom G6-- July 15- 2023- July 28 2023      Reason for review: Currently on anti-hyperglycemic therapy and failing to achieve glycemic goals.    Lab Results   Component Value Date    HGBA1C 7.3 (H) 06/10/2023         Current diabetes medications and doses: recently started Omnipod 5 on 7/20/2023  Insulin pump settings:  Basal: 0.8 units/hr   ICR: 1:10-- set doses -- 5-6 units with meals   ISF: 1:45  Target: 120  IOB: 3.5 hours      ________________________________________________________________    SUMMARY of KEY FINDINGS:      Average glucose: 160  Above 180 mg/dL: 30%   (Above 250 mg/dL: 1% )  Within  mg/dL: 68%  Below 70 mg/dL: <1%  (Below 54 mg/dL: 0% )      CGM data reviewed and notable for the following trends:  Since starting the pump it does look like her blood sugars are improving but she is having some prandial excursions      Will make the following changes based on review of data:  Consider giving 6 units with meals to help with prandial excursions  Can cut back on correction factor to 1:50 -- will discuss with education     Kiara Sánchez NP

## 2023-08-09 ENCOUNTER — CLINICAL SUPPORT (OUTPATIENT)
Dept: DIABETES | Facility: CLINIC | Age: 48
End: 2023-08-09
Payer: MEDICAID

## 2023-08-09 DIAGNOSIS — E13.9 LADA (LATENT AUTOIMMUNE DIABETES MELLITUS IN ADULTS): Primary | ICD-10-CM

## 2023-08-09 PROCEDURE — 95251 PR GLUCOSE MONITOR, 72 HOUR, PHYS INTERP: ICD-10-PCS | Mod: S$PBB,NDTC,, | Performed by: NURSE PRACTITIONER

## 2023-08-09 PROCEDURE — 95251 CONT GLUC MNTR ANALYSIS I&R: CPT | Mod: S$PBB,NDTC,, | Performed by: NURSE PRACTITIONER

## 2023-08-09 PROCEDURE — 98960 EDU&TRN PT SELF-MGMT NQHP 1: CPT | Mod: 95,,, | Performed by: DIETITIAN, REGISTERED

## 2023-08-09 PROCEDURE — 98960 PR SELF-MGMT EDUC & TRAIN, 1 PT, EA 30 MIN: ICD-10-PCS | Mod: 95,,, | Performed by: DIETITIAN, REGISTERED

## 2023-08-10 NOTE — PROGRESS NOTES
Diabetes Care Specialist Progress Note  Author: Kati Espinosa RD, CDE  Date: 8/15/2023    Program Intake  Reason for Diabetes Program Visit:: Intervention  Type of Intervention:: Individual  Individual: Education  Education: Pattern Management, Nutrition and Meal Planning, Advanced Pump  Current diabetes risk level:: low (HgbA1c 7.3)  In the last 12 months, have you:: none  Permission to speak with others about care:: no    Lab Results   Component Value Date    HGBA1C 7.3 (H) 06/10/2023     Diabetes Care Specialist Virtual Visit Note       The patient location is: at home in [place], [place]  The chief complaint leading to consultation is: Diabetes  Visit type: audiovisual  Total time spent with patient: 40 min   Each patient to whom he or she provides medical services by telemedicine is:  (1) informed of the relationship between the physician and patient and the respective role of any other health care provider with respect to management of the patient; and (2) notified that he or she may decline to receive medical services by telemedicine and may withdraw from such care at any time.    Clinical  Reviewed CGM and Pump download    Additional Social History  Struggling with carbohydrate counting and being sure accurately counting carbs    Diabetes Self-Management Skills Assessment    Diabetes Disease Process/Treatment Options  Diabetes Disease Process/Treatment Options: Skills Assessment Completed: No  Assessment indicates:: Adequate understanding  Deferred due to:: Other (comment) (previously completed, there has been no change and patient has adequate understanding)  Area of need?: No    Nutrition/Healthy Eating  Method of carbohydrate measurement:: carb counting/reading labels  Patient can identify foods that impact blood sugar.: yes  Patient-identified foods:: starches (bread, pasta, rice, cereal), soda, sweets, starchy vegetables (corn, peas, beans), fruit/fruit juice, milk, yogurt  Nutrition/Healthy Eating Skills  Assessment Completed:: Yes  Assessment indicates:: Instruction Needed, Adequate understanding (needs some extra help with carb counting)  Area of need?: Yes    Physical Activity/Exercise  Physical Activity/Exercise Skills Assessment Completed: : No  Assessment indicates:: Adequate understanding  Deffered due to:: Other (comment) (previously completed, there has been no change and patient has adequate understanding)  Area of need?: No    Medications  Medication Skills Assessment Completed:: No  Assessment indicates:: Adequate understanding  Deffered due to:: Other (comment) (previously completed, there has been no change and patient has adequate understanding)  Area of need?: No    Home Blood Glucose Monitoring  Home Blood Glucose Monitoring Skills Assessment Completed: : No  Assessment indicates:: Adequate understanding  Deferred due to:: Other (comment) (previously completed, there has been no change and patient has adequate understanding)  Area of need?: No    Acute Complications  Patient is able to identify types of acute complications: Yes  Patient Identified:: Hypoglycemia, Hyperglycemia  Patient is able to state the basic meaning of hypoglycemia?: Yes  Able to state the blood sugar range for hypoglycemia?: yes  Patient stated range:: <70  Patient can identify general symptoms of hypoglycemia: yes  Patient identified:: shakiness  Able to state proper treatment of hypoglycemia?: yes  Patient identified:: 1/2 can soda/fruit juice, other (see comments)  Patient is able to state the basic meaning of hyperglycemia?: Yes  Able to state the blood sugar range for hyperglycemia?: yes  Patient stated range:: >200  Patient able to state proper treatment of hyperglycemia?: yes  Patient identified:: take medication as recommended, monitor blood sugar  Patient able to verbalize sick day plan?: yes  Patient-stated sick day plan:: drink more fluids, check blood sugar every 2-3 hours, seek medical attention for nausea,  diarrhea, vomiting, fever with high blood sugar  Acute Complications Skills Assessment Completed: : Yes  Assessment indicates:: Adequate understanding  Area of need?: No    Chronic Complications  Chronic Complications Skills Assessment Completed: : No  Assessment indicates:: Adequate understanding  Deferred due to:: Other (comment) (previously completed, there has been no change and patient has adequate understanding)  Area of need?: No    Psychosocial/Coping  Psychosocial/Coping Skills Assessment Completed: : No  Assessment indicates:: Adequate understanding  Deffered due to:: Other (comment) (previously completed, there has been no change and patient has adequate understanding)  Area of need?: No      Diabetes Self Support Plan    Diabetes Self-Management Support Plan  Diabetes learning:: DM apps  Exercise/nutrition:: apps  Review status:: Patient has selected and agrees to support plan., Patient was provided Diabetes Self-Management Support Plan document that includes support options.    Assessment Summary and Plan    Based on today's diabetes care assessment, the following areas of need were identified:      Social 7/20/2023   Support No   Access to Mass Media/Tech No   Cognitive/Behavioral Health No   Culture/Yazdanism No   Communication No   Health Literacy No        Clinical 7/20/2023   Medication Adherence No   Lab Compliance No   Nutritional Status No        Diabetes Self-Management Skills 8/9/2023   Diabetes Disease Process/Treatment Options No   Nutrition/Healthy Eating Yes, Reviewed carb counting, portion control, importance of spacing meals throughout the day to prevent post prandial elevations.  Recommended low saturated fat, low sodium diet to aid in control of hypertension and cholesterol. Reviewed plate method and portion control, dining out tips, meal planning, reading a food label, healthy snack options, benefits of physical activity  Reviewed carbohydrate counting and apps available to help with  carb tracking and counting   Physical Activity/Exercise No   Medication No   Home Blood Glucose Monitoring No   Acute Complications No   Chronic Complications No   Psychosocial/Coping No          Today's interventions were provided through individual discussion, instruction, and written materials were provided.      Patient verbalized understanding of instruction and written materials.  Pt was able to return back demonstration of instructions today. Patient understood key points, needs reinforcement and further instruction.     Diabetes Self-Management Care Plan:    Today's Diabetes Self-Management Care Plan was developed with Javier's input. Javier has agreed to work toward the following goal(s) to improve his/her overall diabetes control.      Care Plan: Diabetes Management   Updates made since 7/16/2023 12:00 AM        Problem: Acute Complications         Goal: Patient agrees to identify and manage signs and symptoms of high/low blood sugar (hyper/hypoglycemia) by keeping a log of events and using proper treatment. Completed 7/20/2023   Start Date: 4/14/2022   Expected End Date: 5/14/2022   This Visit's Progress: Met   Recent Progress: Deferred   Priority: High   Barriers: No Barriers Identified   Note:    Hyperglycemia  ABC's of Diabetes   Discussed importance of A1c less than 6.0 to reduce risk of micro and macro complications, controlled Blood Pressure 130/80 and Cholesterol Lab Values--Total Cholesterol <200mg, LDL < 100, HDL >45 men and >50 women, Triglycerides <150mg for prevention heart disease, heart attack, stroke.  how to use a glucometer  reviewed understanding diabetes distress  reviewed current level and goal level for HgbA1c, blood glucose, microalbumin, and lipids  reviewed signs/symptoms of hyperglycemia  reviewed what level blood sugar is considered high   reviewed at what level to contact the doctor and/or go to the emergency room.   Reviewed what patient can do to decrease blood sugar (ie drink  more water, exercise, take medication as prescribed, monitor blood glucose)     Hypoglycemia  Reviewed blood glucose goals, prevention, detection, and treatment of hypoglycemia, and when to contact the clinic.  reviewed signs/symptoms of hypoglycemia  reviewed what level blood sugar is considered low   reviewed at what level to contact the doctor and/or go to the emergency room.   Reviewed what patient can do to increase blood sugar (ie drink juice or ½ can soda, eat 5-6 pieces of candy, 4 glucose tablets, 1 tbsp sugar or honey, glucagon)  Reviewed when glucagon should be used  Reviewed how to use glucagon device (injections and inhaled)         Problem: Chronic Complications         Goal: Patient agrees to have the following diabetes ruben maintenance screenings/appointments eye exam completed in the next 6 months.    Start Date: 7/20/2023   Expected End Date: 1/28/2024   This Visit's Progress: On track   Recent Progress: On track   Priority: Medium   Barriers: No Barriers Identified   Note:    Diabetes Care Schedule   A1c every 3 to 6 months  Lipid Panel every year  Microalbumin (urine test) once per year  Comprehensive Foot Exam once per year  Dilated Eye Exam at least once per year  Dental exam every 6 months  Flu Vaccination yearly   Shingles and Pneumonia Vaccination as recommended by physician      Reviewed diabetes care schedule, foot care guidelines, diabetes and retinopathy screening, s/s hypo and hyperglycemia, long/short term complication of uncontrolled DM, importance of compliance with treatment plan    Reviewed risk of heart disease  High blood pressure  High cholesterol  Diet  Limited activity  Medication non-adherence  Having diabetes    Reviewed that heart disease is the leading cause of death in people with uncontrolled diabetes  Reviewed ways to prevent complications:  Avoid smoking and other types of tobacco  Checking feet daily and having routine comprehensive foot exams  Controlling blood  sugar  Controlling cholesterol and triglycerides  Having regular diabetic eye exams  Healthy eating and regular activity  Maintaining optimal blood glucose control         Task: Discussed current A1c, Blood Pressure, and Cholesterol results (ABCs of Diabetes) and reviewed targets of each. Completed 7/28/2023        Task: Discussed importance of annual microalbumin urine test to monitor kidney function. Completed 7/28/2023        Task: Discussed importance of annual dilated eye exam for prevention of retinopathy. Completed 7/28/2023        Task: Discussed the importance of routine dental exams for the prevention of gum disease and gingivitis and encouraged dental exam every 6 months. Completed 8/15/2023        Task: Instructed on basic daily foot care and encouraged inspecting feet daily. Completed 8/15/2023        Task: Discussed importance of the Flu and Pneumonia Vaccination. Completed 8/15/2023          Follow Up Plan     Follow up in about 3 months (around 11/9/2023) for Insulin Pump Upload, Personal CGM Upload.    Today's care plan and follow up schedule was discussed with patient.  Claritzatara verbalized understanding of the care plan, goals, and agrees to follow up plan.        The patient was encouraged to communicate with his/her health care provider/physician and care team regarding his/her condition(s) and treatment.  I provided the patient with my contact information today and encouraged to contact me via phone or [facility] Patient Portal as needed.     Length of Visit   Total Time: 40 Minutes Diabetes Care Specialist Progress Note  Author: aKti Espinosa RD, CDE  Date: 8/10/2023         Lab Results   Component Value Date    HGBA1C 7.3 (H) 06/10/2023       Clinical                                                                                    Additional Social History                                    Diabetes Self-Management Skills Assessment                                              Diabetes Self  Support Plan         Assessment Summary and Plan    Based on today's diabetes care assessment, the following areas of need were identified:      Social 7/20/2023   Support No   Access to Mass Media/Tech No   Cognitive/Behavioral Health No   Culture/Faith No   Communication No   Health Literacy No        Clinical 7/20/2023   Medication Adherence No   Lab Compliance No   Nutritional Status No        Diabetes Self-Management Skills 7/20/2023   Diabetes Disease Process/Treatment Options No   Nutrition/Healthy Eating No   Physical Activity/Exercise No   Medication No   Home Blood Glucose Monitoring No   Acute Complications Yes   Chronic Complications No   Psychosocial/Coping No          Today's interventions were provided through individual discussion, instruction, and written materials were provided.      Patient verbalized understanding of instruction and written materials.  Pt was able to return back demonstration of instructions today. Patient understood key points, needs reinforcement and further instruction.     Diabetes Self-Management Care Plan:    Today's Diabetes Self-Management Care Plan was developed with Javier's input. Javier has agreed to work toward the following goal(s) to improve his/her overall diabetes control.      Care Plan: Diabetes Management   Updates made since 7/11/2023 12:00 AM        Problem: Acute Complications         Goal: Patient agrees to identify and manage signs and symptoms of high/low blood sugar (hyper/hypoglycemia) by keeping a log of events and using proper treatment. Completed 7/20/2023   Start Date: 4/14/2022   Expected End Date: 5/14/2022   This Visit's Progress: Met   Recent Progress: Deferred   Priority: High   Barriers: No Barriers Identified   Note:    Hyperglycemia  ABC's of Diabetes   Discussed importance of A1c less than 6.0 to reduce risk of micro and macro complications, controlled Blood Pressure 130/80 and Cholesterol Lab Values--Total Cholesterol <200mg, LDL <  100, HDL >45 men and >50 women, Triglycerides <150mg for prevention heart disease, heart attack, stroke.  how to use a glucometer  reviewed understanding diabetes distress  reviewed current level and goal level for HgbA1c, blood glucose, microalbumin, and lipids  reviewed signs/symptoms of hyperglycemia  reviewed what level blood sugar is considered high   reviewed at what level to contact the doctor and/or go to the emergency room.   Reviewed what patient can do to decrease blood sugar (ie drink more water, exercise, take medication as prescribed, monitor blood glucose)     Hypoglycemia  Reviewed blood glucose goals, prevention, detection, and treatment of hypoglycemia, and when to contact the clinic.  reviewed signs/symptoms of hypoglycemia  reviewed what level blood sugar is considered low   reviewed at what level to contact the doctor and/or go to the emergency room.   Reviewed what patient can do to increase blood sugar (ie drink juice or ½ can soda, eat 5-6 pieces of candy, 4 glucose tablets, 1 tbsp sugar or honey, glucagon)  Reviewed when glucagon should be used  Reviewed how to use glucagon device (injections and inhaled)         Problem: Chronic Complications         Goal: Patient agrees to have the following diabetes ruben maintenance screenings/appointments eye exam completed in the next 6 months.    Start Date: 7/20/2023   Expected End Date: 1/28/2024   This Visit's Progress: On track   Recent Progress: Deferred   Priority: Medium   Barriers: No Barriers Identified   Note:    Diabetes Care Schedule   A1c every 3 to 6 months  Lipid Panel every year  Microalbumin (urine test) once per year  Comprehensive Foot Exam once per year  Dilated Eye Exam at least once per year  Dental exam every 6 months  Flu Vaccination yearly   Shingles and Pneumonia Vaccination as recommended by physician      Reviewed diabetes care schedule, foot care guidelines, diabetes and retinopathy screening, s/s hypo and hyperglycemia,  long/short term complication of uncontrolled DM, importance of compliance with treatment plan    Reviewed risk of heart disease  High blood pressure  High cholesterol  Diet  Limited activity  Medication non-adherence  Having diabetes    Reviewed that heart disease is the leading cause of death in people with uncontrolled diabetes  Reviewed ways to prevent complications:  Avoid smoking and other types of tobacco  Checking feet daily and having routine comprehensive foot exams  Controlling blood sugar  Controlling cholesterol and triglycerides  Having regular diabetic eye exams  Healthy eating and regular activity  Maintaining optimal blood glucose control         Task: Discussed current A1c, Blood Pressure, and Cholesterol results (ABCs of Diabetes) and reviewed targets of each. Completed 7/28/2023        Task: Discussed importance of annual microalbumin urine test to monitor kidney function. Completed 7/28/2023        Task: Discussed importance of annual dilated eye exam for prevention of retinopathy. Completed 7/28/2023          Follow Up Plan     No follow-ups on file.    Today's care plan and follow up schedule was discussed with patient.  Javier verbalized understanding of the care plan, goals, and agrees to follow up plan.        The patient was encouraged to communicate with his/her health care provider/physician and care team regarding his/her condition(s) and treatment.  I provided the patient with my contact information today and encouraged to contact me via phone or [facility] Patient Portal as needed.     Length of Visit   Total Time: 65 Minutes

## 2023-08-18 ENCOUNTER — PATIENT MESSAGE (OUTPATIENT)
Dept: DIABETES | Facility: CLINIC | Age: 48
End: 2023-08-18
Payer: MEDICAID

## 2023-08-18 ENCOUNTER — TELEPHONE (OUTPATIENT)
Dept: DIABETES | Facility: CLINIC | Age: 48
End: 2023-08-18
Payer: MEDICAID

## 2023-08-18 NOTE — PROGRESS NOTES
Continuous Glucose Sensor Report of Personal Device    Javier Enciso is a 48 y.o.female with type 1 diabetes     Interpretation of data from Dexcom G6- reviewed from July 27- August 9, 2023     Reason for review: Currently on anti-hyperglycemic therapy and failing to achieve glycemic goals.    Lab Results   Component Value Date    HGBA1C 7.3 (H) 06/10/2023         Current diabetes medications and doses: Omnipod 5   Insulin pump settings:  Basal: 0.8 units/hr   ICR: 1:10-- set doses -- 5-6 units with meals   ISF: 1:45  Target: 120  IOB: 3.5 hours        ________________________________________________________________    SUMMARY of KEY FINDINGS:      Average glucose: 164  Above 180 mg/dL: 31%  (Above 250 mg/dL: 1%)  Within  mg/dL: 68%  Below 70 mg/dL: 0%  (Below 54 mg/dL: 0%)      CGM data reviewed and notable for the following trends: improving   Prandial excursions       Will make the following changes based on review of data:increase to 6 units with meals     Kiara Sánchez NP

## 2023-08-18 NOTE — TELEPHONE ENCOUNTER
Please let patient know that I looked at her dexocm download and her Omnipod 5 download and her readings are improving.   Some elevations after meals- but improving   She could try giving 6 units with meals instead of 5 units   Follow up as scheduled with me in September   Keep up the good work

## 2023-09-11 PROBLEM — R23.2 VASOMOTOR FLUSHING: Status: ACTIVE | Noted: 2023-09-11

## 2023-09-11 PROBLEM — Z12.11 ENCOUNTER FOR SCREENING COLONOSCOPY: Status: ACTIVE | Noted: 2023-09-11

## 2023-09-18 ENCOUNTER — PATIENT MESSAGE (OUTPATIENT)
Dept: PEDIATRICS | Facility: CLINIC | Age: 48
End: 2023-09-18
Payer: MEDICAID

## 2023-09-19 ENCOUNTER — OFFICE VISIT (OUTPATIENT)
Dept: DIABETES | Facility: CLINIC | Age: 48
End: 2023-09-19
Payer: MEDICAID

## 2023-09-19 VITALS
HEART RATE: 82 BPM | OXYGEN SATURATION: 98 % | SYSTOLIC BLOOD PRESSURE: 125 MMHG | DIASTOLIC BLOOD PRESSURE: 84 MMHG | BODY MASS INDEX: 30.2 KG/M2 | HEIGHT: 72 IN | WEIGHT: 223 LBS

## 2023-09-19 DIAGNOSIS — E89.0 POSTOPERATIVE HYPOTHYROIDISM: ICD-10-CM

## 2023-09-19 DIAGNOSIS — E78.00 PURE HYPERCHOLESTEROLEMIA: Chronic | ICD-10-CM

## 2023-09-19 DIAGNOSIS — E11.42 TYPE 2 DIABETES MELLITUS WITH DIABETIC POLYNEUROPATHY, WITH LONG-TERM CURRENT USE OF INSULIN: ICD-10-CM

## 2023-09-19 DIAGNOSIS — E13.9 LADA (LATENT AUTOIMMUNE DIABETES MELLITUS IN ADULTS): Primary | ICD-10-CM

## 2023-09-19 DIAGNOSIS — E89.0 HISTORY OF THYROIDECTOMY, TOTAL: ICD-10-CM

## 2023-09-19 DIAGNOSIS — Z79.4 TYPE 2 DIABETES MELLITUS WITH HYPERGLYCEMIA, WITH LONG-TERM CURRENT USE OF INSULIN: ICD-10-CM

## 2023-09-19 DIAGNOSIS — I10 PRIMARY HYPERTENSION: Chronic | ICD-10-CM

## 2023-09-19 DIAGNOSIS — Z79.4 TYPE 2 DIABETES MELLITUS WITH DIABETIC POLYNEUROPATHY, WITH LONG-TERM CURRENT USE OF INSULIN: ICD-10-CM

## 2023-09-19 DIAGNOSIS — Z96.41 INSULIN PUMP IN PLACE: ICD-10-CM

## 2023-09-19 DIAGNOSIS — E11.65 TYPE 2 DIABETES MELLITUS WITH HYPERGLYCEMIA, WITH LONG-TERM CURRENT USE OF INSULIN: ICD-10-CM

## 2023-09-19 DIAGNOSIS — E66.09 CLASS 1 OBESITY DUE TO EXCESS CALORIES WITH SERIOUS COMORBIDITY AND BODY MASS INDEX (BMI) OF 30.0 TO 30.9 IN ADULT: ICD-10-CM

## 2023-09-19 DIAGNOSIS — Z71.9 HEALTH EDUCATION/COUNSELING: ICD-10-CM

## 2023-09-19 PROCEDURE — 99214 OFFICE O/P EST MOD 30 MIN: CPT | Mod: S$PBB,,, | Performed by: NURSE PRACTITIONER

## 2023-09-19 PROCEDURE — 3008F BODY MASS INDEX DOCD: CPT | Mod: CPTII,,, | Performed by: NURSE PRACTITIONER

## 2023-09-19 PROCEDURE — 95251 PR GLUCOSE MONITOR, 72 HOUR, PHYS INTERP: ICD-10-PCS | Mod: ,,, | Performed by: NURSE PRACTITIONER

## 2023-09-19 PROCEDURE — 3008F PR BODY MASS INDEX (BMI) DOCUMENTED: ICD-10-PCS | Mod: CPTII,,, | Performed by: NURSE PRACTITIONER

## 2023-09-19 PROCEDURE — 3051F PR MOST RECENT HEMOGLOBIN A1C LEVEL 7.0 - < 8.0%: ICD-10-PCS | Mod: CPTII,,, | Performed by: NURSE PRACTITIONER

## 2023-09-19 PROCEDURE — 99999 PR PBB SHADOW E&M-EST. PATIENT-LVL V: ICD-10-PCS | Mod: PBBFAC,,, | Performed by: NURSE PRACTITIONER

## 2023-09-19 PROCEDURE — 1160F PR REVIEW ALL MEDS BY PRESCRIBER/CLIN PHARMACIST DOCUMENTED: ICD-10-PCS | Mod: CPTII,,, | Performed by: NURSE PRACTITIONER

## 2023-09-19 PROCEDURE — 3074F PR MOST RECENT SYSTOLIC BLOOD PRESSURE < 130 MM HG: ICD-10-PCS | Mod: CPTII,,, | Performed by: NURSE PRACTITIONER

## 2023-09-19 PROCEDURE — 3079F DIAST BP 80-89 MM HG: CPT | Mod: CPTII,,, | Performed by: NURSE PRACTITIONER

## 2023-09-19 PROCEDURE — 99215 OFFICE O/P EST HI 40 MIN: CPT | Mod: PBBFAC,PN | Performed by: NURSE PRACTITIONER

## 2023-09-19 PROCEDURE — 3079F PR MOST RECENT DIASTOLIC BLOOD PRESSURE 80-89 MM HG: ICD-10-PCS | Mod: CPTII,,, | Performed by: NURSE PRACTITIONER

## 2023-09-19 PROCEDURE — 3051F HG A1C>EQUAL 7.0%<8.0%: CPT | Mod: CPTII,,, | Performed by: NURSE PRACTITIONER

## 2023-09-19 PROCEDURE — 99214 PR OFFICE/OUTPT VISIT, EST, LEVL IV, 30-39 MIN: ICD-10-PCS | Mod: S$PBB,,, | Performed by: NURSE PRACTITIONER

## 2023-09-19 PROCEDURE — 99999 PR PBB SHADOW E&M-EST. PATIENT-LVL V: CPT | Mod: PBBFAC,,, | Performed by: NURSE PRACTITIONER

## 2023-09-19 PROCEDURE — 1159F PR MEDICATION LIST DOCUMENTED IN MEDICAL RECORD: ICD-10-PCS | Mod: CPTII,,, | Performed by: NURSE PRACTITIONER

## 2023-09-19 PROCEDURE — 1160F RVW MEDS BY RX/DR IN RCRD: CPT | Mod: CPTII,,, | Performed by: NURSE PRACTITIONER

## 2023-09-19 PROCEDURE — 3074F SYST BP LT 130 MM HG: CPT | Mod: CPTII,,, | Performed by: NURSE PRACTITIONER

## 2023-09-19 PROCEDURE — 1159F MED LIST DOCD IN RCRD: CPT | Mod: CPTII,,, | Performed by: NURSE PRACTITIONER

## 2023-09-19 PROCEDURE — 95251 CONT GLUC MNTR ANALYSIS I&R: CPT | Mod: ,,, | Performed by: NURSE PRACTITIONER

## 2023-09-19 PROCEDURE — 4010F ACE/ARB THERAPY RXD/TAKEN: CPT | Mod: CPTII,,, | Performed by: NURSE PRACTITIONER

## 2023-09-19 PROCEDURE — 4010F PR ACE/ARB THEARPY RXD/TAKEN: ICD-10-PCS | Mod: CPTII,,, | Performed by: NURSE PRACTITIONER

## 2023-09-19 RX ORDER — TIRZEPATIDE 2.5 MG/.5ML
2.5 INJECTION, SOLUTION SUBCUTANEOUS
Qty: 4 PEN | Refills: 0 | Status: SHIPPED | OUTPATIENT
Start: 2023-09-19 | End: 2024-03-08

## 2023-09-19 NOTE — PATIENT INSTRUCTIONS
Start Mounjaro weekly   We will start with 2.5 mg weekly for 4 weeks and then consider increasing to 5 mg weekly If needed and tolerated     Continue Omnipod 5 with dexcom   Next  visit we can consider changing the Novolog to Fiasp       Pump backup plan    If the insulin pump is non functional and discontinued for anticipated more than 20 hours, please give daily injections of:   Long acting insulin Lantus  20 units daily   Short acting insulin Novolog 6-8 units  for meals according to carb ratios and sensitivity factor in the pump.     When the insulin pump is restarted, do not restart basal rates until at least 22 hours after the last long acting insulin injection. You can set a 0% temporary basal setting that will last until this time and use your pump to bolus for meals and correction.     For any technical insulin pump issues, please contact the insulin pump company; the toll free number is printed on the label on the back of the insulin pump.       If your sugar is running high for a few hours and does not respond to two correction doses from the insulin pump, it may mean that you have a bad pump site and the site should be changed

## 2023-09-19 NOTE — ASSESSMENT & PLAN NOTE
Uncontrolled   + TOM   c-peptide level WNL X2 now   Can treat as T2DM   Dexcom is helping with self awareness and self accountability  Unable to tolerate Ozempic due to extreme nausea  Unable to tolerate Jardiance due to yeast infections.   Will try Mounjaro-- she wants to work on appetite, food, and weight loss       -- Medication Changes:   Start Mounjaro weekly   We will start with 2.5 mg weekly for 4 weeks and then consider increasing to 5 mg weekly If needed and tolerated     Continue Omnipod 5 with dexcom   Next  visit we can consider changing the Novolog to Fiasp     Insulin pump settings: continue current doses   Basal: 0.8 units/hr   ICR: 1:10-- set doses -- 6 units with meals   ISF: 1:45  Target: 120  IOB: 3.5 hours     -- Reviewed goals of therapy are to get the best control we can without hypoglycemia.  -- Reviewed patient's current insulin regimen. Clarified proper insulin dose and timing in relation to meals, etc. Insulin injection sites and proper rotation instructed.    -- Advised frequent self blood glucose monitoring.  Patient encouraged to document glucose results and bring them to every clinic visit.  Continue to use the dexcom personal CGM-- dexcom G6    -- Hypoglycemia precautions discussed. Instructed on precautions before driving.    -- Call for Bg repeatedly < 70 or > 180.   -- Close adherence to lifestyle changes recommended.   -- Periodic follow ups for eye evaluations, foot care and dental care suggested.         Patient has diabetes mellitus and manages diabetes with intensive insulin regimen and uses prandial and basal insulin daily-- omnipod 5   Patient requires a therapeutic CGM and is willing to use therapeutic CGM for the necessary frequent adjustments of insulin therapy.  I have completed an in-person visit during the previous 6 months and will continue to have in-person visits every 6 months to assess adherence to their CGM regimen and diabetes treatment plan.  Due to COVID  pandemic and need for remote monitoring this tool is medically necessary

## 2023-09-19 NOTE — PROGRESS NOTES
CC:   Chief Complaint   Patient presents with    Diabetes Mellitus       HPI: Javier Enciso is a 48 y.o. female presents for a follow up visit today for the management of T2DM.     She was diagnosed with prediabetes in 2018 and was on Metformin, developed T2DM around 2020. She went to the ER in December 2021 and she was started on insulin therapy.     Family hx of diabetes: mother and father   Hospitalized for diabetes: denies   Insulin therapy: December 2021 4/2022- TOM + 0.04 - slightly positive   4/2022- c-peptide level elevated 5.53  10/2022- repeat c-peptide level  elevated 5.72    No personal or FH of thyroid cancer or personal of pancreatic cancer or pancreatitis.     Hx of thyroidectomy due to cysts- January 2012-- not cancerous. LT4 150 mcg daily   Last TSH WNL --she reports that she is taking her levothyroxine      Our last visit was in June 2023  At that visit we adjusted the Lantus and Novolog  and continued dexcom   She met with education and decided to move forward with the omnipod 5   She started the omnipod 5 insulin pump with education on 7/20/2023  See attached downloads   Recent A1c has stayed at 7.3%                 DIABETES COMPLICATIONS: peripheral neuropathy      Diabetes Management Status    ASA: stopped ASA     Statin: Taking--Crestor 40 mg  ACE/ARB: Taking--losartan 50 mg      The 10-year ASCVD risk score (Kizzy HARRISON, et al., 2019) is: 6.8%    Values used to calculate the score:      Age: 48 years      Sex: Female      Is Non- : Yes      Diabetic: Yes      Tobacco smoker: No      Systolic Blood Pressure: 125 mmHg      Is BP treated: Yes      HDL Cholesterol: 40 mg/dL      Total Cholesterol: 142 mg/dL      Screening or Prevention Patient's value Goal Complete/Controlled?   HgA1C Testing and Control   Lab Results   Component Value Date    HGBA1C 7.3 (H) 09/11/2023      Annually/Less than 8% No   Lipid profile : 09/11/2023 Annually Yes   LDL control Lab Results    Component Value Date    LDLCALC 85.8 09/11/2023    Annually/Less than 100 mg/dl  No   Nephropathy screening Lab Results   Component Value Date    LABMICR <5.0 10/05/2022     Lab Results   Component Value Date    PROTEINUA Negative 10/05/2022    Annually No   Blood pressure BP Readings from Last 1 Encounters:   09/19/23 125/84    Less than 140/90 Yes   Dilated retinal exam Most Recent Eye Exam Date: Not Found Annually No   Foot exam   : 02/21/2022 Annually No       CURRENT A1C:    Hemoglobin A1C   Date Value Ref Range Status   09/11/2023 7.3 (H) 4.0 - 5.6 % Final     Comment:     ADA Screening Guidelines:  5.7-6.4%  Consistent with prediabetes  >or=6.5%  Consistent with diabetes    High levels of fetal hemoglobin interfere with the HbA1C  assay. Heterozygous hemoglobin variants (HbS, HgC, etc)do  not significantly interfere with this assay.   However, presence of multiple variants may affect accuracy.     06/10/2023 7.3 (H) 4.0 - 5.6 % Final     Comment:     ADA Screening Guidelines:  5.7-6.4%  Consistent with prediabetes  >or=6.5%  Consistent with diabetes    High levels of fetal hemoglobin interfere with the HbA1C  assay. Heterozygous hemoglobin variants (HbS, HgC, etc)do  not significantly interfere with this assay.   However, presence of multiple variants may affect accuracy.     03/07/2023 7.6 (H) 4.0 - 5.6 % Final     Comment:     ADA Screening Guidelines:  5.7-6.4%  Consistent with prediabetes  >or=6.5%  Consistent with diabetes    High levels of fetal hemoglobin interfere with the HbA1C  assay. Heterozygous hemoglobin variants (HbS, HgC, etc)do  not significantly interfere with this assay.   However, presence of multiple variants may affect accuracy.         GOAL A1C: 6.5% without hypoglycemia       DM MEDICATIONS USED IN THE PAST: Lantus, Novolog   Metformin -- wasn't working   dexcom   Ozempic -- nausea   Jardiance yeast infection   Omnipod 5       CURRENT DIABETES MEDICATIONS  Insulin Pump: Omnipod 5   With Novolog   Pump settings:  Basal:0.8 units/hr   ICR:1:10  ISF: 1:45  Target: 120  IOB: 3.5 hours     Pump site change: q 3 days   Cartridge change: q 3 days  Insulin TDD: 19.9 units    Basal 59%   Bolus 41 %   Using the bolus wizard: 1.4 bolues per day   Overridin%    100% in auto mode     Back up Lantus/Levemir: back up Lantus     Patient's pump was downloaded in clinic today and reviewed with patient.   Please see attached documents for pump download.         BLOOD GLUCOSE MONITORING:   Sensor type: Dexcom G6   Average BG readin  Time in range: 78%    Estimated A1c is 7 %   18% high and 3% very high   <1% low and 0% very low   Site change:  q10 days    Two weeks prior the average was 162  in target range 74% of the time with an estimated A1c of 7.2%      Supplies from [employer] or diabetes management supply??     Sensor was downloaded in clinic today and reviewed with patient.   Please see attached document for download.       HYPOGLYCEMIA:   <1% low 0% very low   0% low - 2 weeks prior   Glucagon kit: Baqsimi    Medic alert bracelet: denies       MEALS: eating 3 meals per day   BF: cup of coffee, oatmeal with waffle or wheat bread with egg- or [employer]   Lunch: job pays for lunch--- fast food- or meat with veggie - or Burger and No fries   Dinner: veggie with meat -- rarely does rice -- spinach, [place] cut green beans or broccoli   Snack: rarely   Drinks: -- previously was drinking cranberry juice -- she was doing sugary beverage until recently   So she stopped the juices   Water makes her own tea with sugar substitute          CURRENT EXERCISE:  PT 2 times per week for her knee       Review of Systems  Review of Systems   Constitutional:  Negative for appetite change, fatigue and unexpected weight change.   HENT:  Negative for trouble swallowing.    Eyes:  Negative for visual disturbance.   Respiratory:  Negative for shortness of breath.    Cardiovascular:  Negative for chest pain.    Gastrointestinal:  Negative for nausea.   Endocrine: Negative for polydipsia, polyphagia and polyuria.   Genitourinary:         No Nocturia    Musculoskeletal:  Positive for arthralgias (left knee). Negative for back pain.   Skin:  Negative for wound.   Neurological:  Positive for numbness.       Physical Exam   Physical Exam  Vitals and nursing note reviewed.   Constitutional:       Appearance: She is well-developed. She is obese.   HENT:      Head: Normocephalic and atraumatic.      Right Ear: External ear normal.      Left Ear: External ear normal.      Nose: Nose normal.   Neck:      Thyroid: No thyromegaly.      Trachea: No tracheal deviation.   Cardiovascular:      Rate and Rhythm: Normal rate and regular rhythm.      Heart sounds: No murmur heard.  Pulmonary:      Effort: Pulmonary effort is normal. No respiratory distress.      Breath sounds: Normal breath sounds.   Abdominal:      Palpations: Abdomen is soft.      Tenderness: There is no abdominal tenderness.      Hernia: No hernia is present.   Musculoskeletal:      Cervical back: Normal range of motion and neck supple.   Skin:     General: Skin is warm and dry.      Capillary Refill: Capillary refill takes less than 2 seconds.      Findings: No rash.      Comments: POD sites and dexcom sites are normal appearing. No lipo hypertropthy or atrophy     Neurological:      Mental Status: She is alert and oriented to person, place, and time.      Cranial Nerves: No cranial nerve deficit.   Psychiatric:         Behavior: Behavior normal.         Judgment: Judgment normal.         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   Will try Mounjaro-- she wants to work on appetite, food, and weight loss       -- Medication Changes:   Start Mounjaro weekly   We will start with 2.5 mg weekly for 4 weeks and then consider increasing to 5 mg weekly If needed and tolerated     Continue Omnipod 5 with dexcom   Next  visit we can consider changing the Novolog to Fiasp     Insulin pump settings: continue current doses   Basal: 0.8 units/hr   ICR: 1:10-- set doses -- 6 units with meals   ISF: 1:45  Target: 120  IOB: 3.5 hours     -- Reviewed goals of therapy are to get the best control we can without hypoglycemia.  -- Reviewed patient's current insulin regimen. Clarified proper insulin dose and timing in relation to meals, etc. Insulin injection sites and proper rotation instructed.    -- Advised frequent self blood glucose monitoring.  Patient encouraged to document glucose results and bring them to every clinic visit.  Continue to use the dexcom personal CGM-- dexcom G6    -- Hypoglycemia precautions discussed. Instructed on precautions before driving.    -- Call for Bg repeatedly < 70 or > 180.   -- Close adherence to lifestyle changes recommended.   -- Periodic follow ups for eye evaluations, foot care and dental care suggested.         Patient has diabetes mellitus and manages diabetes with intensive insulin regimen and uses prandial and basal insulin daily-- omnipod 5   Patient requires a therapeutic CGM and is willing to use therapeutic CGM for the necessary frequent adjustments of insulin therapy.  I have completed an in-person visit during the previous 6 months and will continue to have in-person visits every 6 months to assess adherence to their CGM regimen and diabetes treatment plan.  Due to COVID pandemic and need for remote monitoring this tool is medically necessary            Type 2 diabetes mellitus with diabetic polyneuropathy, with long-term current use of insulin  Optimize BG readings.   See above.   Referral to  podiatry     Educated patient to check feet daily for any foreign objects and/or wounds. Discussed with patient the importance of wearing appropriate footwear at all times, not to walk barefoot ever, and to check shoes before putting them on feet. Instructed patient to keep feet dry by regularly changing shoes and socks and drying feet after baths and exercises. Also, instructed patient to report any new lesions, discolorations, or swelling to a healthcare professional.        Primary hypertension  BP goal is < 140/90.   Tolerating ARB  Controlled   Blood pressure goals discussed with patient      Pure hypercholesterolemia  On statin per ADA recommendations  LDL goal < 100. LDL at goal. LFTs WNL. Continue statin.         Class 1 obesity due to excess calories with serious comorbidity and body mass index (BMI) of 30.0 to 30.9 in adult  Body mass index is 30.24 kg/m².  Increases insulin resistance.   Discussed DM diet and exercise.       Postoperative hypothyroidism  TSH WNL, on LT4 150 mcg, reinforced take by itself on empty stomach, first thing in the morning and wait at least 30-60 minutes to eat, drink, or take other medications.      Seen by General Endocrine in August of 2022-- Dr. Kumar       History of thyroidectomy, total  On LT4   Reports cysts that were removed were  Benign  Evaluated by General Endocrine in August of 2022-- Dr. NAHUN Santos     Insulin pump in place  See above           I spent a total of 30 minutes on the day of the visit.This includes face to face time and non-face to face time preparing to see the patient (eg, review of tests), obtaining and/or reviewing separately obtained history, documenting clinical information in the electronic or other health record, independently interpreting results and communicating results to the patient/family/caregiver, or care coordinator.        Follow up in about 4 months (around 1/19/2024).  Schedule with podiatry please   Follow up with me in 4 months with  labs prior       Orders Placed This Encounter   Procedures    Hemoglobin A1C     Standing Status:   Future     Standing Expiration Date:   3/19/2025    Basic Metabolic Panel     Standing Status:   Future     Standing Expiration Date:   3/19/2025    Microalbumin/Creatinine Ratio, Urine     Standing Status:   Future     Standing Expiration Date:   3/19/2025     Order Specific Question:   Specimen Source     Answer:   Urine    TSH     Standing Status:   Future     Standing Expiration Date:   3/19/2025    Ambulatory referral/consult to Podiatry     Standing Status:   Future     Standing Expiration Date:   3/19/2025     Referral Priority:   Routine     Referral Type:   Consultation     Referral Reason:   Specialty Services Required     Referred to Provider:   Shell Edwards DPM     Requested Specialty:   Podiatry     Number of Visits Requested:   1       Recommendations were discussed with the patient in detail  The patient verbalized understanding and agrees with the plan outlined as above.     This note was partly generated with [employer] voice recognition software. I apologize for any possible typographical errors.

## 2023-10-02 ENCOUNTER — OFFICE VISIT (OUTPATIENT)
Dept: ORTHOPEDICS | Facility: CLINIC | Age: 48
End: 2023-10-02
Payer: MEDICAID

## 2023-10-02 VITALS
HEART RATE: 93 BPM | SYSTOLIC BLOOD PRESSURE: 110 MMHG | BODY MASS INDEX: 30.62 KG/M2 | DIASTOLIC BLOOD PRESSURE: 72 MMHG | HEIGHT: 72 IN | WEIGHT: 226.06 LBS

## 2023-10-02 DIAGNOSIS — M95.8 OSTEOCHONDRAL DEFECT OF CONDYLE OF FEMUR: ICD-10-CM

## 2023-10-02 DIAGNOSIS — M25.562 CHRONIC PAIN OF LEFT KNEE: Primary | ICD-10-CM

## 2023-10-02 DIAGNOSIS — G89.29 CHRONIC PAIN OF LEFT KNEE: Primary | ICD-10-CM

## 2023-10-02 PROCEDURE — 3074F PR MOST RECENT SYSTOLIC BLOOD PRESSURE < 130 MM HG: ICD-10-PCS | Mod: CPTII,,, | Performed by: ORTHOPAEDIC SURGERY

## 2023-10-02 PROCEDURE — 4010F ACE/ARB THERAPY RXD/TAKEN: CPT | Mod: CPTII,,, | Performed by: ORTHOPAEDIC SURGERY

## 2023-10-02 PROCEDURE — 99999 PR PBB SHADOW E&M-EST. PATIENT-LVL IV: ICD-10-PCS | Mod: PBBFAC,,, | Performed by: ORTHOPAEDIC SURGERY

## 2023-10-02 PROCEDURE — 3051F HG A1C>EQUAL 7.0%<8.0%: CPT | Mod: CPTII,,, | Performed by: ORTHOPAEDIC SURGERY

## 2023-10-02 PROCEDURE — 4010F PR ACE/ARB THEARPY RXD/TAKEN: ICD-10-PCS | Mod: CPTII,,, | Performed by: ORTHOPAEDIC SURGERY

## 2023-10-02 PROCEDURE — 3008F BODY MASS INDEX DOCD: CPT | Mod: CPTII,,, | Performed by: ORTHOPAEDIC SURGERY

## 2023-10-02 PROCEDURE — 99214 OFFICE O/P EST MOD 30 MIN: CPT | Mod: S$PBB,,, | Performed by: ORTHOPAEDIC SURGERY

## 2023-10-02 PROCEDURE — 99214 PR OFFICE/OUTPT VISIT, EST, LEVL IV, 30-39 MIN: ICD-10-PCS | Mod: S$PBB,,, | Performed by: ORTHOPAEDIC SURGERY

## 2023-10-02 PROCEDURE — 1159F PR MEDICATION LIST DOCUMENTED IN MEDICAL RECORD: ICD-10-PCS | Mod: CPTII,,, | Performed by: ORTHOPAEDIC SURGERY

## 2023-10-02 PROCEDURE — 3074F SYST BP LT 130 MM HG: CPT | Mod: CPTII,,, | Performed by: ORTHOPAEDIC SURGERY

## 2023-10-02 PROCEDURE — 1159F MED LIST DOCD IN RCRD: CPT | Mod: CPTII,,, | Performed by: ORTHOPAEDIC SURGERY

## 2023-10-02 PROCEDURE — 3078F PR MOST RECENT DIASTOLIC BLOOD PRESSURE < 80 MM HG: ICD-10-PCS | Mod: CPTII,,, | Performed by: ORTHOPAEDIC SURGERY

## 2023-10-02 PROCEDURE — 99214 OFFICE O/P EST MOD 30 MIN: CPT | Mod: PBBFAC,PN | Performed by: ORTHOPAEDIC SURGERY

## 2023-10-02 PROCEDURE — 99999 PR PBB SHADOW E&M-EST. PATIENT-LVL IV: CPT | Mod: PBBFAC,,, | Performed by: ORTHOPAEDIC SURGERY

## 2023-10-02 PROCEDURE — 3008F PR BODY MASS INDEX (BMI) DOCUMENTED: ICD-10-PCS | Mod: CPTII,,, | Performed by: ORTHOPAEDIC SURGERY

## 2023-10-02 PROCEDURE — 3078F DIAST BP <80 MM HG: CPT | Mod: CPTII,,, | Performed by: ORTHOPAEDIC SURGERY

## 2023-10-02 PROCEDURE — 3051F PR MOST RECENT HEMOGLOBIN A1C LEVEL 7.0 - < 8.0%: ICD-10-PCS | Mod: CPTII,,, | Performed by: ORTHOPAEDIC SURGERY

## 2023-10-02 NOTE — PROGRESS NOTES
HPI    Javier Enciso is here 9 months s/p the following procedure:     1/9/2023  Arthroscopic left knee chondroplasty  Arthroscopic Plica excision left knee    Overall doing ok.  Has had issues with swelling and effusions recurrently since her surgery.  Still having difficulty with pain mostly in the medial aspect of her knee that has not resolved with therapy and injection.    Past Medical History:   Diagnosis Date    Allergies     Diabetes mellitus, type 2     High cholesterol     Hypertension     Thyroid disease      Past Surgical History:   Procedure Laterality Date    BREAST CYST ASPIRATION Left 1996    BREAST CYST ASPIRATION Right 2001    ESOPHAGOGASTRODUODENOSCOPY N/A 10/26/2022    Procedure: EGD (ESOPHAGOGASTRODUODENOSCOPY);  Surgeon: Dale Bray MD;  Location: [facility] ENDO;  Service: Endoscopy;  Laterality: N/A;    HYSTERECTOMY      KNEE ARTHROSCOPY W/ MENISCECTOMY Left 1/9/2023    Procedure: ARTHROSCOPY, KNEE, WITH MENISCECTOMY;  Surgeon: Tim Soto MD;  Location: [facility] OR;  Service: Orthopedics;  Laterality: Left;  Linvatec video   fast fix    L WRIST SURGERY Left     THYROID SURGERY         Current Outpatient Medications:     ALPRAZolam (XANAX) 0.25 MG tablet, Take 1 tablet (0.25 mg total) by mouth nightly as needed for Anxiety., Disp: 30 tablet, Rfl: 3    amLODIPine (NORVASC) 10 MG tablet, Take 1 tablet (10 mg total) by mouth once daily., Disp: 30 tablet, Rfl: 3    cetirizine (ZYRTEC) 10 MG tablet, Take 1 tablet (10 mg total) by mouth nightly., Disp: 30 tablet, Rfl: 3    diclofenac sodium (SOLARAZE) 3 % gel, AAA TID PRN, Disp: 100 g, Rfl: 3    docusate sodium (COLACE) 100 MG capsule, Take 1 capsule (100 mg total) by mouth 2 (two) times daily., Disp: 60 capsule, Rfl: 3    ergocalciferol (ERGOCALCIFEROL) 50,000 unit Cap, Take 1 capsule (50,000 Units total) by mouth every 7 days., Disp: 4 capsule, Rfl: 3    esomeprazole (NEXIUM) 40 MG capsule, Take 1 capsule (40 mg total) by mouth before  "breakfast., Disp: 30 capsule, Rfl: 3    ibuprofen (ADVIL,MOTRIN) 600 MG tablet, Take 1 tablet (600 mg total) by mouth 3 (three) times daily., Disp: 60 tablet, Rfl: 1    insulin aspart U-100 (NOVOLOG U-100 INSULIN ASPART) 100 unit/mL injection, To use continuously with insulin pump. Max TDD of 100 units, Disp: 30 mL, Rfl: 6    insulin pump cart,auto,BT-cntr (OMNIPOD 5 G6 INTRO KIT, GEN 5,) Crtg, Inject 1 Device into the skin Every 3 (three) days., Disp: 1 each, Rfl: 0    insulin pump cart,automated,BT (OMNIPOD 5 G6 PODS, GEN 5,) Crtg, Inject 1 Device into the skin Every 3 (three) days., Disp: 2 each, Rfl: 11    levothyroxine (SYNTHROID) 150 MCG tablet, Take 1 tablet (150 mcg total) by mouth every morning., Disp: 30 tablet, Rfl: 3    losartan (COZAAR) 50 MG tablet, Take 1 tablet (50 mg total) by mouth once daily., Disp: 30 tablet, Rfl: 3    montelukast (SINGULAIR) 10 mg tablet, Take 1 tablet (10 mg total) by mouth once daily., Disp: 30 tablet, Rfl: 3    rosuvastatin (CRESTOR) 40 MG Tab, Take 1 tablet (40 mg total) by mouth every evening., Disp: 30 tablet, Rfl: 3    XHANCE 93 mcg/actuation AerB, USE 1 SPRAY IN EACH NOSTRIL TWICE DAILY AS DIRECTED, Disp: 16 mL, Rfl: 3    BD VARUN 2ND GEN PEN NEEDLE 32 gauge x 5/32" Ndle, To use with insulin injections 5 times per day (Patient not taking: Reported on 10/2/2023), Disp: 150 each, Rfl: 11    estradioL (ESTRACE) 1 MG tablet, Take 1 tablet (1 mg total) by mouth once daily. (Patient not taking: Reported on 10/2/2023), Disp: 30 tablet, Rfl: 11    tirzepatide (MOUNJARO) 2.5 mg/0.5 mL PnIj, Inject 2.5 mg into the skin every 7 days. (Patient not taking: Reported on 10/2/2023), Disp: 4 pen , Rfl: 0    tiZANidine (ZANAFLEX) 4 MG tablet, Take 4 mg by mouth nightly as needed., Disp: , Rfl:     traMADoL (ULTRAM) 50 mg tablet, Take 50 mg by mouth every 4 to 6 hours as needed., Disp: , Rfl:   Review of patient's allergies indicates:   Allergen Reactions    Hydrocodone Itching    " Hydrocodone-acetaminophen      Itchy (skin)^     Social History     Social History Narrative    Not on file     Family History   Problem Relation Age of Onset    Diabetes Mother     Diabetes Father     Breast cancer Maternal Aunt          Physical Exam:     Patient is alert and oriented no acute distress.   Assistive Device:   None    Left knee:  There is a small to medium-sized left knee suprapatellar joint effusion.  There is tenderness along the tibial tubercle and lateral joint line, medial joint line.  Tenderness over the medial femoral condyle.  Stable to varus and valgus stress.  Negative Homans sign.  Stable Lachman's exam. No erythema or ecchymosis noted.     Arthroscopic pictures reviewed showing full-thickness 1 x 1 cm cartilage loss over the weight-bearing surface of the medial femoral condyle    Assessment    Javier Enciso is 9 months Post-op     Plan:    Treatment options were discussed with her in detail.  She is 9 months status post left knee arthroscopy with chondroplasty.  We went over her arthroscopic pictures again showing patellofemoral disease as well as medial femoral condyle lesion.  She is been struggling mostly with swelling and recurrent effusions.  She is also been struggling with stiffness and pain despite therapy.  Failed conservative treatment with injections and continued formal PT.  At this point would recommend repeat MRI of the knee to evaluate the cartilage.  Discussed that she may be a candidate for osteochondral allograft if her symptoms continue to worsen.

## 2023-10-17 ENCOUNTER — PATIENT MESSAGE (OUTPATIENT)
Dept: PODIATRY | Facility: CLINIC | Age: 48
End: 2023-10-17
Payer: MEDICAID

## 2023-10-22 ENCOUNTER — PATIENT MESSAGE (OUTPATIENT)
Dept: DIABETES | Facility: CLINIC | Age: 48
End: 2023-10-22
Payer: MEDICAID

## 2023-10-24 ENCOUNTER — HOSPITAL ENCOUNTER (OUTPATIENT)
Dept: RADIOLOGY | Facility: HOSPITAL | Age: 48
Discharge: HOME | End: 2023-10-24
Attending: ORTHOPAEDIC SURGERY
Payer: MEDICAID

## 2023-10-24 DIAGNOSIS — M95.8 OSTEOCHONDRAL DEFECT OF CONDYLE OF FEMUR: ICD-10-CM

## 2023-10-24 PROCEDURE — 73721 MRI JNT OF LWR EXTRE W/O DYE: CPT | Mod: 26,LT,GC, | Performed by: RADIOLOGY

## 2023-10-24 PROCEDURE — 73721 MRI JNT OF LWR EXTRE W/O DYE: CPT | Mod: TC,LT

## 2023-10-24 PROCEDURE — 73721 MRI KNEE WITHOUT CONTRAST LEFT: ICD-10-PCS | Mod: 26,LT,GC, | Performed by: RADIOLOGY

## 2023-11-01 ENCOUNTER — TELEPHONE (OUTPATIENT)
Dept: DIABETES | Facility: CLINIC | Age: 48
End: 2023-11-01
Payer: MEDICAID

## 2023-11-01 RX ORDER — BLOOD-GLUCOSE SENSOR
1 EACH MISCELLANEOUS
Qty: 3 EACH | Refills: 11 | Status: SHIPPED | OUTPATIENT
Start: 2023-11-01 | End: 2024-03-08 | Stop reason: SDUPTHER

## 2023-11-01 RX ORDER — BLOOD-GLUCOSE TRANSMITTER
1 EACH MISCELLANEOUS
Qty: 1 EACH | Refills: 4 | Status: SHIPPED | OUTPATIENT
Start: 2023-11-01 | End: 2024-03-08 | Stop reason: SDUPTHER

## 2023-11-03 ENCOUNTER — TELEPHONE (OUTPATIENT)
Dept: DIABETES | Facility: CLINIC | Age: 48
End: 2023-11-03
Payer: MEDICAID

## 2023-11-06 ENCOUNTER — OFFICE VISIT (OUTPATIENT)
Dept: ORTHOPEDICS | Facility: CLINIC | Age: 48
End: 2023-11-06
Payer: MEDICAID

## 2023-11-06 VITALS
WEIGHT: 227.94 LBS | BODY MASS INDEX: 30.87 KG/M2 | DIASTOLIC BLOOD PRESSURE: 85 MMHG | HEART RATE: 77 BPM | SYSTOLIC BLOOD PRESSURE: 132 MMHG | HEIGHT: 72 IN

## 2023-11-06 DIAGNOSIS — S83.522A TEAR OF PCL (POSTERIOR CRUCIATE LIGAMENT) OF KNEE, LEFT, INITIAL ENCOUNTER: Primary | ICD-10-CM

## 2023-11-06 PROCEDURE — 3008F PR BODY MASS INDEX (BMI) DOCUMENTED: ICD-10-PCS | Mod: CPTII,,, | Performed by: ORTHOPAEDIC SURGERY

## 2023-11-06 PROCEDURE — 3075F SYST BP GE 130 - 139MM HG: CPT | Mod: CPTII,,, | Performed by: ORTHOPAEDIC SURGERY

## 2023-11-06 PROCEDURE — 3051F PR MOST RECENT HEMOGLOBIN A1C LEVEL 7.0 - < 8.0%: ICD-10-PCS | Mod: CPTII,,, | Performed by: ORTHOPAEDIC SURGERY

## 2023-11-06 PROCEDURE — 99213 PR OFFICE/OUTPT VISIT, EST, LEVL III, 20-29 MIN: ICD-10-PCS | Mod: S$PBB,,, | Performed by: ORTHOPAEDIC SURGERY

## 2023-11-06 PROCEDURE — 3075F PR MOST RECENT SYSTOLIC BLOOD PRESS GE 130-139MM HG: ICD-10-PCS | Mod: CPTII,,, | Performed by: ORTHOPAEDIC SURGERY

## 2023-11-06 PROCEDURE — 4010F PR ACE/ARB THEARPY RXD/TAKEN: ICD-10-PCS | Mod: CPTII,,, | Performed by: ORTHOPAEDIC SURGERY

## 2023-11-06 PROCEDURE — 99999 PR PBB SHADOW E&M-EST. PATIENT-LVL IV: CPT | Mod: PBBFAC,,, | Performed by: ORTHOPAEDIC SURGERY

## 2023-11-06 PROCEDURE — 3051F HG A1C>EQUAL 7.0%<8.0%: CPT | Mod: CPTII,,, | Performed by: ORTHOPAEDIC SURGERY

## 2023-11-06 PROCEDURE — 3008F BODY MASS INDEX DOCD: CPT | Mod: CPTII,,, | Performed by: ORTHOPAEDIC SURGERY

## 2023-11-06 PROCEDURE — 3079F PR MOST RECENT DIASTOLIC BLOOD PRESSURE 80-89 MM HG: ICD-10-PCS | Mod: CPTII,,, | Performed by: ORTHOPAEDIC SURGERY

## 2023-11-06 PROCEDURE — 4010F ACE/ARB THERAPY RXD/TAKEN: CPT | Mod: CPTII,,, | Performed by: ORTHOPAEDIC SURGERY

## 2023-11-06 PROCEDURE — 99213 OFFICE O/P EST LOW 20 MIN: CPT | Mod: S$PBB,,, | Performed by: ORTHOPAEDIC SURGERY

## 2023-11-06 PROCEDURE — 99214 OFFICE O/P EST MOD 30 MIN: CPT | Mod: PBBFAC,PN | Performed by: ORTHOPAEDIC SURGERY

## 2023-11-06 PROCEDURE — 99999 PR PBB SHADOW E&M-EST. PATIENT-LVL IV: ICD-10-PCS | Mod: PBBFAC,,, | Performed by: ORTHOPAEDIC SURGERY

## 2023-11-06 PROCEDURE — 3079F DIAST BP 80-89 MM HG: CPT | Mod: CPTII,,, | Performed by: ORTHOPAEDIC SURGERY

## 2023-11-07 ENCOUNTER — CLINICAL SUPPORT (OUTPATIENT)
Dept: DIABETES | Facility: CLINIC | Age: 48
End: 2023-11-07
Payer: MEDICAID

## 2023-11-07 VITALS — HEIGHT: 72 IN | BODY MASS INDEX: 30.88 KG/M2 | WEIGHT: 228 LBS

## 2023-11-07 DIAGNOSIS — E13.9 LADA (LATENT AUTOIMMUNE DIABETES MELLITUS IN ADULTS): Primary | ICD-10-CM

## 2023-11-07 PROCEDURE — 98960 PR SELF-MGMT EDUC & TRAIN, 1 PT, EA 30 MIN: ICD-10-PCS | Mod: 95,,, | Performed by: DIETITIAN, REGISTERED

## 2023-11-07 PROCEDURE — 98960 EDU&TRN PT SELF-MGMT NQHP 1: CPT | Mod: 95,,, | Performed by: DIETITIAN, REGISTERED

## 2023-11-08 ENCOUNTER — TELEPHONE (OUTPATIENT)
Dept: DIABETES | Facility: CLINIC | Age: 48
End: 2023-11-08
Payer: MEDICAID

## 2023-11-08 ENCOUNTER — HOSPITAL ENCOUNTER (OUTPATIENT)
Dept: RADIOLOGY | Facility: HOSPITAL | Age: 48
Discharge: HOME | End: 2023-11-08
Attending: PODIATRIST
Payer: MEDICAID

## 2023-11-08 DIAGNOSIS — E11.9 TYPE 2 DIABETES MELLITUS WITHOUT COMPLICATIONS: ICD-10-CM

## 2023-11-08 PROCEDURE — 73630 X-RAY EXAM OF FOOT: CPT | Mod: TC,50

## 2023-11-08 PROCEDURE — 73630 XR FOOT COMPLETE 3 VIEW BILATERAL: ICD-10-PCS | Mod: 26,50,, | Performed by: RADIOLOGY

## 2023-11-08 PROCEDURE — 73630 X-RAY EXAM OF FOOT: CPT | Mod: 26,50,, | Performed by: RADIOLOGY

## 2023-11-10 NOTE — PROGRESS NOTES
HPI    Javier Enciso is here 9 months s/p the following procedure:     1/9/2023  Arthroscopic left knee chondroplasty  Arthroscopic Plica excision left knee    Overall doing ok.  Has had issues with swelling and effusions recurrently since her surgery.  Still having difficulty with pain mostly in the medial aspect of her knee that has not resolved with therapy and injection.    ____________________________________________________________________    Interval history 11/10/2023 : Patient returns today for MRI follow-up of their knee.  They report no changes since I saw them last.     Past Medical History:   Diagnosis Date    Allergies     Diabetes mellitus, type 2     High cholesterol     Hypertension     Thyroid disease      Past Surgical History:   Procedure Laterality Date    BREAST CYST ASPIRATION Left 1996    BREAST CYST ASPIRATION Right 2001    ESOPHAGOGASTRODUODENOSCOPY N/A 10/26/2022    Procedure: EGD (ESOPHAGOGASTRODUODENOSCOPY);  Surgeon: Dale Bray MD;  Location: [facility];  Service: Endoscopy;  Laterality: N/A;    HYSTERECTOMY      KNEE ARTHROSCOPY W/ MENISCECTOMY Left 1/9/2023    Procedure: ARTHROSCOPY, KNEE, WITH MENISCECTOMY;  Surgeon: Tim Soto MD;  Location: [facility] OR;  Service: Orthopedics;  Laterality: Left;  Linvatec video   fast fix    L WRIST SURGERY Left     THYROID SURGERY         Current Outpatient Medications:     amLODIPine (NORVASC) 10 MG tablet, Take 1 tablet (10 mg total) by mouth once daily., Disp: 30 tablet, Rfl: 3    blood-glucose sensor (DEXCOM G6 SENSOR) Priscila, 1 Device by Misc.(Non-Drug; Combo Route) route every 10 days., Disp: 3 each, Rfl: 11    blood-glucose transmitter (DEXCOM G6 TRANSMITTER) Priscila, 1 Device by Misc.(Non-Drug; Combo Route) route every 3 (three) months., Disp: 1 each, Rfl: 4    cetirizine (ZYRTEC) 10 MG tablet, Take 1 tablet (10 mg total) by mouth nightly., Disp: 30 tablet, Rfl: 3    diclofenac sodium (SOLARAZE) 3 % gel, AAA TID PRN, Disp: 100 g,  "Rfl: 3    docusate sodium (COLACE) 100 MG capsule, Take 1 capsule (100 mg total) by mouth 2 (two) times daily., Disp: 60 capsule, Rfl: 3    ergocalciferol (ERGOCALCIFEROL) 50,000 unit Cap, Take 1 capsule (50,000 Units total) by mouth every 7 days., Disp: 4 capsule, Rfl: 3    esomeprazole (NEXIUM) 40 MG capsule, Take 1 capsule (40 mg total) by mouth before breakfast., Disp: 30 capsule, Rfl: 3    ibuprofen (ADVIL,MOTRIN) 600 MG tablet, Take 1 tablet (600 mg total) by mouth 3 (three) times daily., Disp: 60 tablet, Rfl: 1    insulin aspart U-100 (NOVOLOG U-100 INSULIN ASPART) 100 unit/mL injection, To use continuously with insulin pump. Max TDD of 100 units, Disp: 30 mL, Rfl: 6    insulin pump cart,auto,BT-cntr (OMNIPOD 5 G6 INTRO KIT, GEN 5,) Crtg, Inject 1 Device into the skin Every 3 (three) days., Disp: 1 each, Rfl: 0    insulin pump cart,automated,BT (OMNIPOD 5 G6 PODS, GEN 5,) Crtg, Inject 1 Device into the skin Every 3 (three) days., Disp: 2 each, Rfl: 11    levothyroxine (SYNTHROID) 150 MCG tablet, Take 1 tablet (150 mcg total) by mouth every morning., Disp: 30 tablet, Rfl: 3    losartan (COZAAR) 50 MG tablet, Take 1 tablet (50 mg total) by mouth once daily., Disp: 30 tablet, Rfl: 3    montelukast (SINGULAIR) 10 mg tablet, Take 1 tablet (10 mg total) by mouth once daily., Disp: 30 tablet, Rfl: 3    rosuvastatin (CRESTOR) 40 MG Tab, Take 1 tablet (40 mg total) by mouth every evening., Disp: 30 tablet, Rfl: 3    tiZANidine (ZANAFLEX) 4 MG tablet, Take 4 mg by mouth nightly as needed., Disp: , Rfl:     traMADoL (ULTRAM) 50 mg tablet, Take 50 mg by mouth every 4 to 6 hours as needed., Disp: , Rfl:     XHANCE 93 mcg/actuation AerB, USE 1 SPRAY IN EACH NOSTRIL TWICE DAILY AS DIRECTED, Disp: 16 mL, Rfl: 3    ALPRAZolam (XANAX) 0.25 MG tablet, Take 1 tablet (0.25 mg total) by mouth nightly as needed for Anxiety., Disp: 30 tablet, Rfl: 3    BD VARUN 2ND GEN PEN NEEDLE 32 gauge x 5/32" Ndle, To use with insulin injections " 5 times per day (Patient not taking: Reported on 10/2/2023), Disp: 150 each, Rfl: 11    estradioL (ESTRACE) 1 MG tablet, Take 1 tablet (1 mg total) by mouth once daily. (Patient not taking: Reported on 10/2/2023), Disp: 30 tablet, Rfl: 11    tirzepatide (MOUNJARO) 2.5 mg/0.5 mL PnIj, Inject 2.5 mg into the skin every 7 days. (Patient not taking: Reported on 10/2/2023), Disp: 4 pen , Rfl: 0  Review of patient's allergies indicates:   Allergen Reactions    Hydrocodone Itching    Hydrocodone-acetaminophen      Itchy (skin)^     Social History     Social History Narrative    Not on file     Family History   Problem Relation Age of Onset    Diabetes Mother     Diabetes Father     Breast cancer Maternal Aunt          Physical Exam:     Patient is alert and oriented no acute distress.   Assistive Device:   None    Left knee:  There is a small to medium-sized left knee suprapatellar joint effusion.  There is tenderness along the tibial tubercle and lateral joint line, medial joint line.  Tenderness over the medial femoral condyle.  Stable to varus and valgus stress.  Negative Homans sign.  Stable Lachman's exam. No erythema or ecchymosis noted.     Arthroscopic pictures reviewed showing full-thickness 1 x 1 cm cartilage loss over the weight-bearing surface of the medial femoral condyle    MRI of the left knee shows evidence of previous chondroplasty of the patellofemoral joint.  No significant chondral defect noted on MRI read of the medial and lateral femoral condyles.  There is interval change of the PCL with possible high-grade partial tearing.  This is new from previous MRI.    Assessment    Claritzaclayjethro TAL Colmenareshitesh is 9 months Post-op     Plan:    Treatment options were discussed with her in detail.  She is 9 months status post left knee arthroscopy with chondroplasty.  We went over her arthroscopic pictures again showing patellofemoral disease as well as medial femoral condyle lesion.  She is been struggling mostly with  swelling and recurrent effusions.  She is also been struggling with stiffness and pain despite therapy.  Repeat MRI was overall unremarkable with the exception of PCL injury which is possibly acute on chronic.  Upon questioning she does state she felt a pop in her knee 2 or 3 months ago and did have some tightness in the back of the knee.  At this point we will get a 2nd opinion to see if this is clinically relevant.

## 2023-11-12 ENCOUNTER — PATIENT MESSAGE (OUTPATIENT)
Dept: DIABETES | Facility: CLINIC | Age: 48
End: 2023-11-12
Payer: MEDICAID

## 2023-11-13 ENCOUNTER — PATIENT OUTREACH (OUTPATIENT)
Dept: DIABETES | Facility: CLINIC | Age: 48
End: 2023-11-13
Payer: MEDICAID

## 2023-11-13 DIAGNOSIS — E13.9 LADA (LATENT AUTOIMMUNE DIABETES MELLITUS IN ADULTS): Primary | ICD-10-CM

## 2023-11-13 PROCEDURE — 95251 PR GLUCOSE MONITOR, 72 HOUR, PHYS INTERP: ICD-10-PCS | Mod: ,,, | Performed by: NURSE PRACTITIONER

## 2023-11-13 PROCEDURE — 95251 CONT GLUC MNTR ANALYSIS I&R: CPT | Mod: ,,, | Performed by: NURSE PRACTITIONER

## 2023-11-13 NOTE — PROGRESS NOTES
Diabetes Care Specialist Follow-up Note  Author: Kati Espinosa RD, CDE  Date: 11/13/2023    Program Intake  Reason for Diabetes Program Visit:: Intervention  Type of Intervention:: Individual  Individual: Education  Education: Advanced Pump, Pattern Management  Device Training: Other (putting settings into replacement device)  Current diabetes risk level:: low (HgbA1c 7.3)  In the last 12 months, have you:: none  Permission to speak with others about care:: no    Lab Results   Component Value Date    HGBA1C 7.3 (H) 09/11/2023     A1c Pre Diabetes Care Specialist Intervention:  7.3%    Clinical    Weight: 103.4 kg (228 lb)   Height: 6' (182.9 cm)   Body mass index is 30.92 kg/m².                        Medication Information  Medication adherence impacting ability to self-manage diabetes?: No    Labs  Lab Compliance Barriers: No    Nutritional Status  Diet: Diabetic diet  Meal Plan 24 Hour Recall: Breakfast, Lunch, Dinner, Snack  Meal Plan 24 Hour Recall - Breakfast: cup of coffee, oatmeal with waffle or wheat bread with egg, or Yann Levi's breakfast  Meal Plan 24 Hour Recall - Lunch: fast food usually or meat with veggie or a burger without fries  Meal Plan 24 Hour Recall - Dinner: usually a veggie with meat, rarely will eat rice, eats mostly spinach, Russian cut green beans or broccoli  Meal Plan 24 Hour Recall - Snack: rarely, Drinks: quit drinking cranberry juice and sugary beverages, now drinks water makes and tea with sugar substitute  Change in appetite?: No  Current nutritional status an area of need that is impacting patient's ability to self-manage diabetes?: No    Physical activity/Exercise:   Time constraints    SMBG: using the dexcom G6 with the omnipod 5  Report downloaded and reviewed with patient and provider    Additional Social History    Support  Is Support an area impacting ability to self-manage diabetes?: No    Access to Mass Media & Technology  Media or technology needs impacting ability to  self-manage diabetes?: No    Cognitive/Behavioral Health  Cognitive or behavioral barriers impacting ability to self-manage diabetes?: No    Culture/Gnosticism  Culture or Muslim beliefs that may impact ability to access healthcare: No    Communication  Communication needs impacting ability to self-manage diabetes?: No    Health Literacy  Health literacy needs impacting ability to self-manage diabetes?: No      Diabetes Self-Management Skills Assessment     Diabetes Disease Process/Treatment Options  Diabetes Disease Process/Treatment Options: Skills Assessment Completed: No  Assessment indicates:: Adequate understanding  Deferred due to:: Other (comment) (previously completed, there has been no change and patient has adequate understanding)  Area of need?: No    Nutrition/Healthy Eating  Challenges to healthy eating:: portion control, eating out, going to parties  Method of carbohydrate measurement:: carb counting/reading labels  Patient can identify foods that impact blood sugar.: yes  Patient-identified foods:: starches (bread, pasta, rice, cereal), soda, sweets, starchy vegetables (corn, peas, beans), fruit/fruit juice, milk, yogurt  Nutrition/Healthy Eating Skills Assessment Completed:: Yes  Assessment indicates:: Adequate understanding (needs some extra help with carb counting)  Area of need?: No    Physical Activity/Exercise  Physical Activity/Exercise Skills Assessment Completed: : No  Assessment indicates:: Adequate understanding  Deffered due to:: Other (comment) (previously completed, there has been no change and patient has adequate understanding)  Area of need?: No    Medications  Medication Skills Assessment Completed:: No  Assessment indicates:: Adequate understanding  Deffered due to:: Other (comment) (previously completed, there has been no change and patient has adequate understanding)  Area of need?: No    Home Blood Glucose Monitoring  Home Blood Glucose Monitoring Skills Assessment Completed: :  No  Assessment indicates:: Adequate understanding  Deferred due to:: Other (comment)  Area of need?: No    Acute Complications  Acute Complications Skills Assessment Completed: : No  Assessment indicates:: Adequate understanding  Deffered due to:: Other (comment) (previously completed, there has been no change and patient has adequate understanding)  Area of need?: No    Chronic Complications  Chronic Complications Skills Assessment Completed: : No  Assessment indicates:: Adequate understanding  Deferred due to:: Other (comment)  Area of need?: No    Psychosocial/Coping  Psychosocial/Coping Skills Assessment Completed: : No  Assessment indicates:: Adequate understanding  Deffered due to:: Other (comment)  Area of need?: No      During today's follow-up visit,  the following areas required further assessment and content was provided/reviewed.    Based on today's diabetes care assessment, the following areas of need were identified:          11/7/2023    12:02 AM   Social   Support No   Access to Mass Media/Tech No   Cognitive/Behavioral Health No   Culture/Orthodox No   Communication No   Health Literacy No            11/7/2023    12:02 AM   Clinical   Medication Adherence No   Lab Compliance No   Nutritional Status No            11/7/2023    12:02 AM   Diabetes Self-Management Skills   Diabetes Disease Process/Treatment Options No   Nutrition/Healthy Eating No   Physical Activity/Exercise No   Medication No, setting up new reader - successfully completed    Home Blood Glucose Monitoring No   Acute Complications No   Chronic Complications No   Psychosocial/Coping No        Today's interventions were provided through individual discussion, instruction, and written materials were provided.    Patient verbalized understanding of instruction and written materials.  Pt was able to return back demonstration of instructions today. Patient understood key points, needs reinforcement and further instruction.     Diabetes  Self-Management Care Plan Review and Evaluation of Progress:    During today's follow-up Abimael Diabetes Self-Management Care Plan progress was reviewed and progress was evaluated including his/her input. Javier has agreed to continue his/her journey to improve/maintain overall diabetes control by continuing to set health goals. See care plan progress below.      Care Plan: Diabetes Management   Updates made since 10/14/2023 12:00 AM        Problem: Acute Complications Resolved 11/7/2023        Problem: Chronic Complications         Goal: Patient agrees to have the following diabetes ruben maintenance screenings/appointments eye exam completed in the next 6 months.    Start Date: 7/20/2023   Expected End Date: 1/28/2024   This Visit's Progress: On track   Recent Progress: On track   Priority: Medium   Barriers: No Barriers Identified   Note:    Diabetes Care Schedule   A1c every 3 to 6 months  Lipid Panel every year  Microalbumin (urine test) once per year  Comprehensive Foot Exam once per year  Dilated Eye Exam at least once per year  Dental exam every 6 months  Flu Vaccination yearly   Shingles and Pneumonia Vaccination as recommended by physician      Reviewed diabetes care schedule, foot care guidelines, diabetes and retinopathy screening, s/s hypo and hyperglycemia, long/short term complication of uncontrolled DM, importance of compliance with treatment plan    Reviewed risk of heart disease  High blood pressure  High cholesterol  Diet  Limited activity  Medication non-adherence  Having diabetes    Reviewed that heart disease is the leading cause of death in people with uncontrolled diabetes  Reviewed ways to prevent complications:  Avoid smoking and other types of tobacco  Checking feet daily and having routine comprehensive foot exams  Controlling blood sugar  Controlling cholesterol and triglycerides  Having regular diabetic eye exams  Healthy eating and regular activity  Maintaining optimal blood glucose  control           Follow Up Plan     Follow up in about 3 months (around 2/7/2024) for Insulin Pump Upload, Personal CGM Upload.    Today's care plan and follow up schedule was discussed with patient.  Javier verbalized understanding of the care plan, goals, and agrees to follow up plan.        The patient was encouraged to communicate with his/her health care provider/physician and care team regarding his/her condition(s) and treatment.  I provided the patient with my contact information today and encouraged to contact me via phone or [facility]'s Patient Portal as needed.     Length of Visit   Total Time: 35 Minutes

## 2023-11-14 NOTE — PROGRESS NOTES
Please call patient let her know that reviewed her Dexcom download and her Omnipod download from [employer].  Overall readings look pretty good.  Please remind her that she is a type 1 diabetic so any time she eats any carbohydrate she does need to take both doses of insulin.  She is running the highest in the evening.  I am assuming this is because she might be afraid to go to bed with a normal blood sugar reading.  She was a trusted the pump will turn off of it send him that her blood sugars are dropping.  If she is having a larger meal with dinner I would increase to 7 units with that meal so we would be 70 g of carbs.  If she is snacking after dinner on something she needs to give herself 2 units which would be 20 g of carbs  We will call her readings are not terrible but we do want to get her average down into the 130s 140s since she is young  Please let me know if she is any questions or concerns                  Continuous Glucose Sensor Report of Personal Device    Javier Enciso is a 48 y.o.female with type 1 diabetes     Interpretation of data from Dexcom G6-- 10/24/2023--11/6/2023     Reason for review: Currently on anti-hyperglycemic therapy and failing to achieve glycemic goals.    Lab Results   Component Value Date    HGBA1C 7.3 (H) 09/11/2023         Current diabetes medications and doses: Omnipod 5 AID with dexcom G6     ________________________________________________________________    SUMMARY of KEY FINDINGS:      Average glucose: 157  Above 180 mg/dL: 20%  (Above 250 mg/dL: 1%)  Within  mg/dL: 78%  Below 70 mg/dL: <1%  (Below 54 mg/dL: 0%)      CGM data reviewed and notable for the following trends:  Slightly above goal with prandial excursions  Missing bolus      Will make the following changes based on review of data:  Doses  Account for all carbohydrate intake  Increased to 7 units with larger meals  2 units for snacks  Continue Omnipod 5 and Dexcom G6    Kiara Sánchez NP

## 2023-11-30 ENCOUNTER — LAB VISIT (OUTPATIENT)
Dept: LAB | Facility: HOSPITAL | Age: 48
End: 2023-11-30
Payer: MEDICAID

## 2023-11-30 DIAGNOSIS — Z00.00 WELL ADULT EXAM: ICD-10-CM

## 2023-11-30 DIAGNOSIS — M62.82 NON-TRAUMATIC RHABDOMYOLYSIS: ICD-10-CM

## 2023-11-30 LAB
25(OH)D3+25(OH)D2 SERPL-MCNC: 45 NG/ML (ref 30–96)
ALBUMIN SERPL BCP-MCNC: 3.8 G/DL (ref 3.5–5.2)
ALP SERPL-CCNC: 76 U/L (ref 55–135)
ALT SERPL W/O P-5'-P-CCNC: 16 U/L (ref 10–44)
ANION GAP SERPL CALC-SCNC: 11 MMOL/L (ref 8–16)
AST SERPL-CCNC: 15 U/L (ref 10–40)
BASOPHILS # BLD AUTO: 0.02 K/UL (ref 0–0.2)
BASOPHILS NFR BLD: 0.5 % (ref 0–1.9)
BILIRUB SERPL-MCNC: 0.6 MG/DL (ref 0.1–1)
BUN SERPL-MCNC: 13 MG/DL (ref 6–20)
CALCIUM SERPL-MCNC: 9.4 MG/DL (ref 8.7–10.5)
CHLORIDE SERPL-SCNC: 105 MMOL/L (ref 95–110)
CK SERPL-CCNC: 112 U/L (ref 20–180)
CO2 SERPL-SCNC: 21 MMOL/L (ref 23–29)
CREAT SERPL-MCNC: 0.8 MG/DL (ref 0.5–1.4)
CRP SERPL-MCNC: 3.4 MG/L (ref 0–8.2)
DIFFERENTIAL METHOD: ABNORMAL
EOSINOPHIL # BLD AUTO: 0.1 K/UL (ref 0–0.5)
EOSINOPHIL NFR BLD: 1.9 % (ref 0–8)
ERYTHROCYTE [DISTWIDTH] IN BLOOD BY AUTOMATED COUNT: 13.2 % (ref 11.5–14.5)
ERYTHROCYTE [SEDIMENTATION RATE] IN BLOOD BY PHOTOMETRIC METHOD: 18 MM/HR (ref 0–36)
EST. GFR  (NO RACE VARIABLE): >60 ML/MIN/1.73 M^2
GLUCOSE SERPL-MCNC: 103 MG/DL (ref 70–110)
HAV IGM SERPL QL IA: NORMAL
HBV CORE IGM SERPL QL IA: NORMAL
HBV SURFACE AG SERPL QL IA: NORMAL
HCT VFR BLD AUTO: 41.1 % (ref 37–48.5)
HCV AB SERPL QL IA: NORMAL
HGB BLD-MCNC: 13 G/DL (ref 12–16)
IMM GRANULOCYTES # BLD AUTO: 0.01 K/UL (ref 0–0.04)
IMM GRANULOCYTES NFR BLD AUTO: 0.2 % (ref 0–0.5)
LYMPHOCYTES # BLD AUTO: 1.6 K/UL (ref 1–4.8)
LYMPHOCYTES NFR BLD: 38.8 % (ref 18–48)
MCH RBC QN AUTO: 26.7 PG (ref 27–31)
MCHC RBC AUTO-ENTMCNC: 31.6 G/DL (ref 32–36)
MCV RBC AUTO: 84 FL (ref 82–98)
MONOCYTES # BLD AUTO: 0.4 K/UL (ref 0.3–1)
MONOCYTES NFR BLD: 8.7 % (ref 4–15)
NEUTROPHILS # BLD AUTO: 2.1 K/UL (ref 1.8–7.7)
NEUTROPHILS NFR BLD: 49.9 % (ref 38–73)
NRBC BLD-RTO: 0 /100 WBC
PLATELET # BLD AUTO: 279 K/UL (ref 150–450)
PMV BLD AUTO: 11.3 FL (ref 9.2–12.9)
POTASSIUM SERPL-SCNC: 3.8 MMOL/L (ref 3.5–5.1)
PROT SERPL-MCNC: 7.3 G/DL (ref 6–8.4)
RBC # BLD AUTO: 4.87 M/UL (ref 4–5.4)
RHEUMATOID FACT SERPL-ACNC: <13 IU/ML (ref 0–15)
SODIUM SERPL-SCNC: 137 MMOL/L (ref 136–145)
T4 SERPL-MCNC: 10.4 UG/DL (ref 4.5–11.5)
URATE SERPL-MCNC: 4.8 MG/DL (ref 2.4–5.7)
VIT B12 SERPL-MCNC: 416 PG/ML (ref 210–950)
WBC # BLD AUTO: 4.15 K/UL (ref 3.9–12.7)

## 2023-11-30 PROCEDURE — 86140 C-REACTIVE PROTEIN: CPT | Performed by: NURSE PRACTITIONER

## 2023-11-30 PROCEDURE — 85652 RBC SED RATE AUTOMATED: CPT | Performed by: NURSE PRACTITIONER

## 2023-11-30 PROCEDURE — 82550 ASSAY OF CK (CPK): CPT | Performed by: NURSE PRACTITIONER

## 2023-11-30 PROCEDURE — 84436 ASSAY OF TOTAL THYROXINE: CPT | Performed by: NURSE PRACTITIONER

## 2023-11-30 PROCEDURE — 82607 VITAMIN B-12: CPT | Performed by: NURSE PRACTITIONER

## 2023-11-30 PROCEDURE — 86038 ANTINUCLEAR ANTIBODIES: CPT | Performed by: NURSE PRACTITIONER

## 2023-11-30 PROCEDURE — 86431 RHEUMATOID FACTOR QUANT: CPT | Performed by: NURSE PRACTITIONER

## 2023-11-30 PROCEDURE — 82306 VITAMIN D 25 HYDROXY: CPT | Performed by: NURSE PRACTITIONER

## 2023-11-30 PROCEDURE — 36415 COLL VENOUS BLD VENIPUNCTURE: CPT | Performed by: NURSE PRACTITIONER

## 2023-11-30 PROCEDURE — 86592 SYPHILIS TEST NON-TREP QUAL: CPT | Performed by: NURSE PRACTITIONER

## 2023-11-30 PROCEDURE — 85025 COMPLETE CBC W/AUTO DIFF WBC: CPT | Performed by: NURSE PRACTITIONER

## 2023-11-30 PROCEDURE — 84550 ASSAY OF BLOOD/URIC ACID: CPT | Performed by: NURSE PRACTITIONER

## 2023-11-30 PROCEDURE — 80074 ACUTE HEPATITIS PANEL: CPT | Performed by: NURSE PRACTITIONER

## 2023-11-30 PROCEDURE — 80053 COMPREHEN METABOLIC PANEL: CPT | Performed by: NURSE PRACTITIONER

## 2023-12-01 LAB — RPR SER QL: NORMAL

## 2023-12-04 LAB — ANA SER QL IF: NORMAL

## 2023-12-06 ENCOUNTER — HOSPITAL ENCOUNTER (OUTPATIENT)
Dept: RADIOLOGY | Facility: HOSPITAL | Age: 48
Discharge: HOME | End: 2023-12-06
Attending: NURSE PRACTITIONER
Payer: MEDICAID

## 2023-12-06 DIAGNOSIS — Z12.31 SCREENING MAMMOGRAM FOR BREAST CANCER: ICD-10-CM

## 2023-12-13 ENCOUNTER — HOSPITAL ENCOUNTER (OUTPATIENT)
Dept: RADIOLOGY | Facility: HOSPITAL | Age: 48
Discharge: HOME | End: 2023-12-13
Attending: NURSE PRACTITIONER
Payer: MEDICAID

## 2023-12-13 PROCEDURE — 77067 SCR MAMMO BI INCL CAD: CPT | Mod: 26,,, | Performed by: RADIOLOGY

## 2023-12-13 PROCEDURE — 77067 MAMMO DIGITAL SCREENING BILAT WITH TOMO: ICD-10-PCS | Mod: 26,,, | Performed by: RADIOLOGY

## 2023-12-13 PROCEDURE — 77063 BREAST TOMOSYNTHESIS BI: CPT | Mod: 26,,, | Performed by: RADIOLOGY

## 2023-12-13 PROCEDURE — 77067 SCR MAMMO BI INCL CAD: CPT | Mod: TC

## 2023-12-13 PROCEDURE — 77063 MAMMO DIGITAL SCREENING BILAT WITH TOMO: ICD-10-PCS | Mod: 26,,, | Performed by: RADIOLOGY

## 2024-02-02 DIAGNOSIS — E13.9 LADA (LATENT AUTOIMMUNE DIABETES MELLITUS IN ADULTS): ICD-10-CM

## 2024-02-05 RX ORDER — INSULIN ASPART 100 [IU]/ML
INJECTION, SOLUTION INTRAVENOUS; SUBCUTANEOUS
Qty: 30 ML | Refills: 6 | Status: SHIPPED | OUTPATIENT
Start: 2024-02-05 | End: 2024-03-08

## 2024-02-23 ENCOUNTER — PATIENT MESSAGE (OUTPATIENT)
Dept: DIABETES | Facility: CLINIC | Age: 49
End: 2024-02-23
Payer: MEDICAID

## 2024-03-01 ENCOUNTER — LAB VISIT (OUTPATIENT)
Dept: LAB | Facility: HOSPITAL | Age: 49
End: 2024-03-01
Attending: NURSE PRACTITIONER
Payer: MEDICAID

## 2024-03-01 DIAGNOSIS — E13.9 LADA (LATENT AUTOIMMUNE DIABETES MELLITUS IN ADULTS): ICD-10-CM

## 2024-03-01 LAB
ALBUMIN/CREAT UR: 5.1 UG/MG (ref 0–30)
ANION GAP SERPL CALC-SCNC: 10 MMOL/L (ref 8–16)
BUN SERPL-MCNC: 13 MG/DL (ref 6–20)
CALCIUM SERPL-MCNC: 10.1 MG/DL (ref 8.7–10.5)
CHLORIDE SERPL-SCNC: 104 MMOL/L (ref 95–110)
CO2 SERPL-SCNC: 23 MMOL/L (ref 23–29)
CREAT SERPL-MCNC: 1 MG/DL (ref 0.5–1.4)
CREAT UR-MCNC: 137 MG/DL (ref 15–325)
EST. GFR  (NO RACE VARIABLE): >60 ML/MIN/1.73 M^2
ESTIMATED AVG GLUCOSE: 151 MG/DL (ref 68–131)
GLUCOSE SERPL-MCNC: 109 MG/DL (ref 70–110)
HBA1C MFR BLD: 6.9 % (ref 4–5.6)
MICROALBUMIN UR DL<=1MG/L-MCNC: 7 UG/ML
POTASSIUM SERPL-SCNC: 4 MMOL/L (ref 3.5–5.1)
SODIUM SERPL-SCNC: 137 MMOL/L (ref 136–145)
TSH SERPL DL<=0.005 MIU/L-ACNC: 1.1 UIU/ML (ref 0.4–4)

## 2024-03-01 PROCEDURE — 80048 BASIC METABOLIC PNL TOTAL CA: CPT | Performed by: NURSE PRACTITIONER

## 2024-03-01 PROCEDURE — 83036 HEMOGLOBIN GLYCOSYLATED A1C: CPT | Performed by: NURSE PRACTITIONER

## 2024-03-01 PROCEDURE — 36415 COLL VENOUS BLD VENIPUNCTURE: CPT | Mod: PO | Performed by: NURSE PRACTITIONER

## 2024-03-01 PROCEDURE — 82043 UR ALBUMIN QUANTITATIVE: CPT | Performed by: NURSE PRACTITIONER

## 2024-03-01 PROCEDURE — 84443 ASSAY THYROID STIM HORMONE: CPT | Performed by: NURSE PRACTITIONER

## 2024-03-08 ENCOUNTER — OFFICE VISIT (OUTPATIENT)
Dept: DIABETES | Facility: CLINIC | Age: 49
End: 2024-03-08
Payer: MEDICAID

## 2024-03-08 VITALS
HEART RATE: 68 BPM | WEIGHT: 219 LBS | BODY MASS INDEX: 29.66 KG/M2 | SYSTOLIC BLOOD PRESSURE: 127 MMHG | DIASTOLIC BLOOD PRESSURE: 74 MMHG | HEIGHT: 72 IN | OXYGEN SATURATION: 98 %

## 2024-03-08 DIAGNOSIS — E11.649 TYPE 2 DIABETES MELLITUS WITH HYPOGLYCEMIA WITHOUT COMA, WITH LONG-TERM CURRENT USE OF INSULIN: ICD-10-CM

## 2024-03-08 DIAGNOSIS — E66.3 OVERWEIGHT (BMI 25.0-29.9): ICD-10-CM

## 2024-03-08 DIAGNOSIS — E78.00 PURE HYPERCHOLESTEROLEMIA: Chronic | ICD-10-CM

## 2024-03-08 DIAGNOSIS — Z96.41 INSULIN PUMP IN PLACE: ICD-10-CM

## 2024-03-08 DIAGNOSIS — E13.9 LADA (LATENT AUTOIMMUNE DIABETES MELLITUS IN ADULTS): Primary | ICD-10-CM

## 2024-03-08 DIAGNOSIS — Z79.4 TYPE 2 DIABETES MELLITUS WITH DIABETIC POLYNEUROPATHY, WITH LONG-TERM CURRENT USE OF INSULIN: ICD-10-CM

## 2024-03-08 DIAGNOSIS — Z79.4 TYPE 2 DIABETES MELLITUS WITH HYPOGLYCEMIA WITHOUT COMA, WITH LONG-TERM CURRENT USE OF INSULIN: ICD-10-CM

## 2024-03-08 DIAGNOSIS — E89.0 POSTOPERATIVE HYPOTHYROIDISM: ICD-10-CM

## 2024-03-08 DIAGNOSIS — Z91.199 NONCOMPLIANCE WITH DIABETES TREATMENT: ICD-10-CM

## 2024-03-08 DIAGNOSIS — Z71.9 HEALTH EDUCATION/COUNSELING: ICD-10-CM

## 2024-03-08 DIAGNOSIS — Z46.81 INSULIN PUMP FITTING OR ADJUSTMENT: ICD-10-CM

## 2024-03-08 DIAGNOSIS — E11.42 TYPE 2 DIABETES MELLITUS WITH DIABETIC POLYNEUROPATHY, WITH LONG-TERM CURRENT USE OF INSULIN: ICD-10-CM

## 2024-03-08 DIAGNOSIS — I10 PRIMARY HYPERTENSION: Chronic | ICD-10-CM

## 2024-03-08 DIAGNOSIS — E89.0 HISTORY OF THYROIDECTOMY, TOTAL: ICD-10-CM

## 2024-03-08 LAB — GLUCOSE SERPL-MCNC: 109 MG/DL (ref 70–110)

## 2024-03-08 PROCEDURE — 3061F NEG MICROALBUMINURIA REV: CPT | Mod: CPTII,,, | Performed by: NURSE PRACTITIONER

## 2024-03-08 PROCEDURE — 3074F SYST BP LT 130 MM HG: CPT | Mod: CPTII,,, | Performed by: NURSE PRACTITIONER

## 2024-03-08 PROCEDURE — 99999PBSHW POCT GLUCOSE, HAND-HELD DEVICE: Mod: PBBFAC,,,

## 2024-03-08 PROCEDURE — 99215 OFFICE O/P EST HI 40 MIN: CPT | Mod: PBBFAC,PN | Performed by: NURSE PRACTITIONER

## 2024-03-08 PROCEDURE — 82962 GLUCOSE BLOOD TEST: CPT | Mod: PBBFAC,PN | Performed by: NURSE PRACTITIONER

## 2024-03-08 PROCEDURE — 1159F MED LIST DOCD IN RCRD: CPT | Mod: CPTII,,, | Performed by: NURSE PRACTITIONER

## 2024-03-08 PROCEDURE — 99999 PR PBB SHADOW E&M-EST. PATIENT-LVL V: CPT | Mod: PBBFAC,,, | Performed by: NURSE PRACTITIONER

## 2024-03-08 PROCEDURE — 3066F NEPHROPATHY DOC TX: CPT | Mod: CPTII,,, | Performed by: NURSE PRACTITIONER

## 2024-03-08 PROCEDURE — 3044F HG A1C LEVEL LT 7.0%: CPT | Mod: CPTII,,, | Performed by: NURSE PRACTITIONER

## 2024-03-08 PROCEDURE — 4010F ACE/ARB THERAPY RXD/TAKEN: CPT | Mod: CPTII,,, | Performed by: NURSE PRACTITIONER

## 2024-03-08 PROCEDURE — 3078F DIAST BP <80 MM HG: CPT | Mod: CPTII,,, | Performed by: NURSE PRACTITIONER

## 2024-03-08 PROCEDURE — 99215 OFFICE O/P EST HI 40 MIN: CPT | Mod: S$PBB,,, | Performed by: NURSE PRACTITIONER

## 2024-03-08 PROCEDURE — 3008F BODY MASS INDEX DOCD: CPT | Mod: CPTII,,, | Performed by: NURSE PRACTITIONER

## 2024-03-08 PROCEDURE — 1160F RVW MEDS BY RX/DR IN RCRD: CPT | Mod: CPTII,,, | Performed by: NURSE PRACTITIONER

## 2024-03-08 PROCEDURE — 95251 CONT GLUC MNTR ANALYSIS I&R: CPT | Mod: ,,, | Performed by: NURSE PRACTITIONER

## 2024-03-08 RX ORDER — INSULIN PMP CART,AUT,G6/7,CNTR
1 EACH SUBCUTANEOUS
Qty: 2 EACH | Refills: 11 | Status: SHIPPED | OUTPATIENT
Start: 2024-03-08

## 2024-03-08 RX ORDER — INSULIN ASPART INJECTION 100 [IU]/ML
INJECTION, SOLUTION SUBCUTANEOUS
Qty: 30 ML | Refills: 6 | Status: SHIPPED | OUTPATIENT
Start: 2024-03-08

## 2024-03-08 RX ORDER — BLOOD-GLUCOSE SENSOR
1 EACH MISCELLANEOUS
Qty: 3 EACH | Refills: 11 | Status: SHIPPED | OUTPATIENT
Start: 2024-03-08 | End: 2025-03-08

## 2024-03-08 RX ORDER — BLOOD-GLUCOSE TRANSMITTER
1 EACH MISCELLANEOUS
Qty: 1 EACH | Refills: 4 | Status: SHIPPED | OUTPATIENT
Start: 2024-03-08 | End: 2025-03-08

## 2024-03-08 NOTE — ASSESSMENT & PLAN NOTE
A1c has improved closer to goal  + TOM   c-peptide level WNL X2 now   Can treat as T2DM   Dexcom is helping with self awareness and self accountability  Unable to tolerate Ozempic due to extreme nausea  Unable to tolerate Jardiance due to yeast infections.   She did not like mounjaro and does not want to revisit   She is willing to try the Fiasp -- will work faster and help when she boluses after she eats   Still needs to work on bolusing when she is eating    She is reporting some lows overnight and had a low blood sugar in our office today--I do not see a whole lot of lows overnight I do see some elevations from her snacking and her not bolusing with overnight snacks  Will cut back on correction overnight to prevent any hypoglycemia      -- Medication Changes:   Continue Omnipod 5 with dexcom   Change Novolog to Fiasp     Insulin pump settings: continue current doses   Basal: 0.65 units/hr -- cut back based on download   ICR: 1:10-- set doses -- 6 units with meals   ISF:  MN-9AM: 1:50-- cut back to prevent reported lows overnight   9AM-MN 1:45  Target: 120  IOB: 3 hours -- changing to Fiasp     -- Reviewed goals of therapy are to get the best control we can without hypoglycemia.  -- Reviewed patient's current insulin regimen. Clarified proper insulin dose and timing in relation to meals, etc. Insulin injection sites and proper rotation instructed.    -- Advised frequent self blood glucose monitoring.  Patient encouraged to document glucose results and bring them to every clinic visit.  Continue to use the dexcom personal CGM-- dexcom G6    -- Hypoglycemia precautions discussed. Instructed on precautions before driving.    -- Call for Bg repeatedly < 70 or > 180.   -- Close adherence to lifestyle changes recommended.   -- Periodic follow ups for eye evaluations, foot care and dental care suggested.         Patient has diabetes mellitus and manages diabetes with intensive insulin regimen and uses prandial and basal  insulin daily-- omnipod 5   Patient requires a therapeutic CGM and is willing to use therapeutic CGM for the necessary frequent adjustments of insulin therapy.  I have completed an in-person visit during the previous 6 months and will continue to have in-person visits every 6 months to assess adherence to their CGM regimen and diabetes treatment plan.  Due to COVID pandemic and need for remote monitoring this tool is medically necessary

## 2024-03-08 NOTE — ASSESSMENT & PLAN NOTE
Body mass index is 29.7 kg/m².  Increases insulin resistance.   Discussed DM diet and exercise.

## 2024-03-08 NOTE — ASSESSMENT & PLAN NOTE
Optimize BG readings.   See above.   Following with podiatry     Educated patient to check feet daily for any foreign objects and/or wounds. Discussed with patient the importance of wearing appropriate footwear at all times, not to walk barefoot ever, and to check shoes before putting them on feet. Instructed patient to keep feet dry by regularly changing shoes and socks and drying feet after baths and exercises. Also, instructed patient to report any new lesions, discolorations, or swelling to a healthcare professional.

## 2024-03-08 NOTE — PROGRESS NOTES
CC:   Chief Complaint   Patient presents with    Diabetes Mellitus       HPI: Javier Enciso is a 49 y.o. female presents for a follow up visit today for the management of T2DM.     She was diagnosed with prediabetes in 2018 and was on Metformin, developed T2DM around 2020. She went to the ER in December 2021 and she was started on insulin therapy.     Family hx of diabetes: mother and father   Hospitalized for diabetes: denies   Insulin therapy: December 2021 4/2022- TOM + 0.04 - slightly positive   4/2022- c-peptide level elevated 5.53  10/2022- repeat c-peptide level  elevated 5.72    No personal or FH of thyroid cancer or personal of pancreatic cancer or pancreatitis.     Hx of thyroidectomy due to cysts- January 2012-- not cancerous. LT4 150 mcg daily   Last TSH WNL --she reports that she is taking her levothyroxine        Our last visit was in September of 2023  At that visit we started her on Mounjaro weekly  We continued her Omnipod 5 with the Dexcom integration  Continued her current pump settings  Her A1c has improved from 7.3% to 6.9%  She stopped the mounjaro due to hypoglycemia.   She is trying to drink SF drinks   She is still missing meal time boluses and timing is off                     DIABETES COMPLICATIONS: peripheral neuropathy      Diabetes Management Status    ASA: stopped ASA     Statin: Taking--Crestor 40 mg  ACE/ARB: Taking--losartan 50 mg      The 10-year ASCVD risk score (Kizzy HARRISON, et al., 2019) is: 7.6%    Values used to calculate the score:      Age: 49 years      Sex: Female      Is Non- : Yes      Diabetic: Yes      Tobacco smoker: No      Systolic Blood Pressure: 127 mmHg      Is BP treated: Yes      HDL Cholesterol: 40 mg/dL      Total Cholesterol: 142 mg/dL      Screening or Prevention Patient's value Goal Complete/Controlled?   HgA1C Testing and Control   Lab Results   Component Value Date    HGBA1C 6.9 (H) 03/01/2024      Annually/Less than 8% No    Lipid profile : 09/11/2023 Annually Yes   LDL control Lab Results   Component Value Date    LDLCALC 85.8 09/11/2023    Annually/Less than 100 mg/dl  No   Nephropathy screening Lab Results   Component Value Date    LABMICR 7.0 03/01/2024     Lab Results   Component Value Date    PROTEINUA Negative 10/05/2022    Annually No   Blood pressure BP Readings from Last 1 Encounters:   03/08/24 127/74    Less than 140/90 Yes   Dilated retinal exam Most Recent Eye Exam Date: Not Found Annually No   Foot exam   : 02/21/2022 Annually No       CURRENT A1C:    Hemoglobin A1C   Date Value Ref Range Status   03/01/2024 6.9 (H) 4.0 - 5.6 % Final     Comment:     ADA Screening Guidelines:  5.7-6.4%  Consistent with prediabetes  >or=6.5%  Consistent with diabetes    High levels of fetal hemoglobin interfere with the HbA1C  assay. Heterozygous hemoglobin variants (HbS, HgC, etc)do  not significantly interfere with this assay.   However, presence of multiple variants may affect accuracy.     09/11/2023 7.3 (H) 4.0 - 5.6 % Final     Comment:     ADA Screening Guidelines:  5.7-6.4%  Consistent with prediabetes  >or=6.5%  Consistent with diabetes    High levels of fetal hemoglobin interfere with the HbA1C  assay. Heterozygous hemoglobin variants (HbS, HgC, etc)do  not significantly interfere with this assay.   However, presence of multiple variants may affect accuracy.     06/10/2023 7.3 (H) 4.0 - 5.6 % Final     Comment:     ADA Screening Guidelines:  5.7-6.4%  Consistent with prediabetes  >or=6.5%  Consistent with diabetes    High levels of fetal hemoglobin interfere with the HbA1C  assay. Heterozygous hemoglobin variants (HbS, HgC, etc)do  not significantly interfere with this assay.   However, presence of multiple variants may affect accuracy.         GOAL A1C: 6.5% without hypoglycemia       DM MEDICATIONS USED IN THE PAST: Lantus, Novolog   Metformin -- wasn't working   dexcom   Ozempic -- nausea   Jardiance yeast infection    Omnipod 5   Mounjaro       CURRENT DIABETES MEDICATIONS Mounjaro stopped due to hypoglycemia   Insulin Pump: Omnipod 5  With Novolog   Pump settings:  Basal:0.8 units/hr   ICR:1:10  ISF: 1:45  Target: 120  IOB: 3.5 hours     Pump site change: q 3 days   Cartridge change: q 3 days  Insulin TDD: 15.5 units    Basal 52%   Bolus 48 %   Using the bolus wizard: 1.3 bolues per day   Overridin%    100% in auto mode     Back up Lantus/Levemir: back up Lantus     Patient's pump was downloaded in clinic today and reviewed with patient.   Please see attached documents for pump download.         BLOOD GLUCOSE MONITORING:   Sensor type: Dexcom G6   Average BG readin  Time in range: 79%    Estimated A1c is 6.9 %   20% high and <1% very high   0% low and 0% very low   Site change:  q10 days    Two weeks prior the average was 157  in target range 73 % of the time with an estimated A1c of 7.1%      Supplies from pharmacy now-- sending to [facility] to get them regularly       Dexcom was reading 65 but fingerstick was 109 and repeat was 100    Results for orders placed or performed in visit on 24   POCT Glucose, Hand-Held Device   Result Value Ref Range    POC Glucose 109 70 - 110 MG/DL         Sensor was downloaded in clinic today and reviewed with patient.   Please see attached document for download.       HYPOGLYCEMIA:   0% low 0% very low   <1% low - 2 weeks prior   Glucagon kit: Baqsimi    Medic alert bracelet: denies       MEALS: eating 3 meals per day   BF: cup of coffee, oatmeal with waffle or wheat bread with egg- or [employer]   Lunch: job pays for lunch--- fast food- or meat with veggie - or Burger and No fries   Dinner: veggie with meat -- rarely does rice -- spinach, Welsh cut green beans or broccoli   Snack: rarely   Drinks: --SF beverages   she stopped the juices and sugary beverages   Water makes her own tea with sugar substitute          CURRENT EXERCISE:  PT completed   None       Review  of Systems  Review of Systems   Constitutional:  Negative for appetite change, fatigue and unexpected weight change.   HENT:  Negative for trouble swallowing.    Eyes:  Negative for visual disturbance.   Respiratory:  Negative for shortness of breath.    Cardiovascular:  Negative for chest pain.   Gastrointestinal:  Negative for nausea.   Endocrine: Negative for polydipsia, polyphagia and polyuria.   Genitourinary:         No Nocturia    Musculoskeletal:  Positive for arthralgias (left knee). Negative for back pain.   Skin:  Negative for wound.   Neurological:  Positive for numbness.       Physical Exam   Physical Exam  Vitals and nursing note reviewed.   Constitutional:       Appearance: She is well-developed. She is obese.   HENT:      Head: Normocephalic and atraumatic.      Right Ear: External ear normal.      Left Ear: External ear normal.      Nose: Nose normal.   Neck:      Thyroid: No thyromegaly.      Trachea: No tracheal deviation.   Cardiovascular:      Rate and Rhythm: Normal rate and regular rhythm.      Heart sounds: No murmur heard.  Pulmonary:      Effort: Pulmonary effort is normal. No respiratory distress.      Breath sounds: Normal breath sounds.   Abdominal:      Palpations: Abdomen is soft.      Tenderness: There is no abdominal tenderness.      Hernia: No hernia is present.   Musculoskeletal:      Cervical back: Normal range of motion and neck supple.   Skin:     General: Skin is warm and dry.      Capillary Refill: Capillary refill takes less than 2 seconds.      Findings: No rash.      Comments: POD sites and dexcom sites are normal appearing. No lipo hypertropthy or atrophy     Neurological:      Mental Status: She is alert and oriented to person, place, and time.      Cranial Nerves: No cranial nerve deficit.   Psychiatric:         Behavior: Behavior normal.         Judgment: Judgment normal.         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   She did not like mounjaro and does not want to revisit   She is willing to try the Fiasp -- will work faster and help when she boluses after she eats   Still needs to work on bolusing when she is eating    She is reporting some lows overnight and had a low blood sugar in our office today--I do not see a whole lot of lows overnight I do see some elevations from her snacking and her not bolusing with overnight snacks  Will cut back on correction overnight to prevent any hypoglycemia      -- Medication Changes:   Continue Omnipod 5 with dexcom   Change Novolog to Fiasp     Insulin pump settings: continue current doses   Basal: 0.65 units/hr -- cut back based on download   ICR: 1:10-- set doses -- 6 units with meals   ISF:  MN-9AM: 1:50-- cut back to prevent reported lows overnight   9AM-MN 1:45  Target: 120  IOB: 3 hours -- changing to Fiasp     -- Reviewed goals of therapy are to get the best control we can without hypoglycemia.  -- Reviewed patient's current insulin regimen. Clarified proper insulin dose and timing in relation to meals, etc. Insulin injection sites and proper rotation instructed.    -- Advised frequent self blood glucose monitoring.  Patient encouraged to document glucose results and bring them to every clinic visit.  Continue to use the dexcom personal CGM-- dexcom G6    -- Hypoglycemia precautions discussed. Instructed on precautions before driving.    -- Call for Bg repeatedly < 70 or > 180.   -- Close adherence to lifestyle changes recommended.   -- Periodic follow ups for eye evaluations, foot care and dental care suggested.          Patient has diabetes mellitus and manages diabetes with intensive insulin regimen and uses prandial and basal insulin daily-- omnipod 5   Patient requires a therapeutic CGM and is willing to use therapeutic CGM for the necessary frequent adjustments of insulin therapy.  I have completed an in-person visit during the previous 6 months and will continue to have in-person visits every 6 months to assess adherence to their CGM regimen and diabetes treatment plan.  Due to COVID pandemic and need for remote monitoring this tool is medically necessary            Type 2 diabetes mellitus with diabetic polyneuropathy, with long-term current use of insulin  Optimize BG readings.   See above.   Following with podiatry     Educated patient to check feet daily for any foreign objects and/or wounds. Discussed with patient the importance of wearing appropriate footwear at all times, not to walk barefoot ever, and to check shoes before putting them on feet. Instructed patient to keep feet dry by regularly changing shoes and socks and drying feet after baths and exercises. Also, instructed patient to report any new lesions, discolorations, or swelling to a healthcare professional.        Primary hypertension  BP goal is < 140/90.   Tolerating ARB  Controlled   Blood pressure goals discussed with patient      Pure hypercholesterolemia  On statin per ADA recommendations  LDL goal < 100. LDL at goal. LFTs WNL. Continue statin.         Overweight (BMI 25.0-29.9)  Body mass index is 29.7 kg/m².  Increases insulin resistance.   Discussed DM diet and exercise.       History of thyroidectomy, total  On LT4   Reports cysts that were removed were  Benign  Evaluated by General Endocrine in August of 2022-- Dr. NAHUN Santos     Postoperative hypothyroidism  TSH WNL, on LT4 150 mcg, reinforced take by itself on empty stomach, first thing in the morning and wait at least 30-60 minutes to eat, drink, or take other medications.      Seen by General  Endocrine in August of 2022-- Dr. Kumar       Insulin pump fitting or adjustment  See above     Insulin pump in place  See above           I spent a total of 45  minutes on the day of the visit.This includes face to face time and non-face to face time preparing to see the patient (eg, review of tests), obtaining and/or reviewing separately obtained history, documenting clinical information in the electronic or other health record, independently interpreting results and communicating results to the patient/family/caregiver, or care coordinator.    Hypoglycemia treated in clinic today  Reviewed insulin pump at length with patient  Discussed activity mode, how to suspend the insulin manually  Bolusing before meals      Follow up in about 4 months (around 7/8/2024).  Follow up with me in 4 months with labs prior         Orders Placed This Encounter   Procedures    Hemoglobin A1C     Standing Status:   Future     Standing Expiration Date:   9/8/2025    Comprehensive Metabolic Panel     Standing Status:   Future     Standing Expiration Date:   9/8/2025    CBC Auto Differential     Standing Status:   Future     Standing Expiration Date:   9/8/2025    Lipid Panel     Standing Status:   Future     Standing Expiration Date:   9/8/2025    Microalbumin/Creatinine Ratio, Urine     Standing Status:   Future     Standing Expiration Date:   9/8/2025     Order Specific Question:   Specimen Source     Answer:   Urine    TSH     Standing Status:   Future     Standing Expiration Date:   9/8/2025    POCT Glucose, Hand-Held Device       Recommendations were discussed with the patient in detail  The patient verbalized understanding and agrees with the plan outlined as above.     This note was partly generated with [employer] voice recognition software. I apologize for any possible typographical errors.

## 2024-03-20 ENCOUNTER — HOSPITAL ENCOUNTER (OUTPATIENT)
Dept: RADIOLOGY | Facility: CLINIC | Age: 49
Discharge: HOME | End: 2024-03-20
Attending: NURSE PRACTITIONER
Payer: MEDICAID

## 2024-03-20 ENCOUNTER — TELEPHONE (OUTPATIENT)
Dept: OPHTHALMOLOGY | Facility: CLINIC | Age: 49
End: 2024-03-20
Payer: MEDICAID

## 2024-03-20 DIAGNOSIS — R07.9 CHEST PAIN, UNSPECIFIED TYPE: ICD-10-CM

## 2024-03-20 PROCEDURE — 71045 X-RAY EXAM CHEST 1 VIEW: CPT | Mod: TC,FY,PO

## 2024-03-20 PROCEDURE — 71045 X-RAY EXAM CHEST 1 VIEW: CPT | Mod: 26,,, | Performed by: RADIOLOGY

## 2024-03-20 NOTE — TELEPHONE ENCOUNTER
Spoke to pt and made aware we can see patient for her dilated Dm exam, but made aware we will be unable to see patient for glasses or contacts due to insurance and she would need to go elsewhere to have that done.         ----- Message from Ralph Macias sent at 3/20/2024 11:34 AM CDT -----  Contact: [phone]  Pt is calling to schedule an appt for  E11.21,Z79.4 (ICD-10-CM) - Controlled type 2 diabetes mellitus with diabetic nephropathy, with long-term current use of insulin. She states she needed it by April. Please call back to further assist.

## 2024-03-21 ENCOUNTER — HOSPITAL ENCOUNTER (EMERGENCY)
Facility: HOSPITAL | Age: 49
Discharge: HOME | End: 2024-03-21
Attending: EMERGENCY MEDICINE
Payer: MEDICAID

## 2024-03-21 VITALS
OXYGEN SATURATION: 99 % | TEMPERATURE: 97 F | HEIGHT: 72 IN | WEIGHT: 220 LBS | BODY MASS INDEX: 29.8 KG/M2 | SYSTOLIC BLOOD PRESSURE: 120 MMHG | HEART RATE: 70 BPM | RESPIRATION RATE: 20 BRPM | DIASTOLIC BLOOD PRESSURE: 81 MMHG

## 2024-03-21 DIAGNOSIS — M54.9 UPPER BACK PAIN ON LEFT SIDE: ICD-10-CM

## 2024-03-21 DIAGNOSIS — R07.9 CHEST PAIN: Primary | ICD-10-CM

## 2024-03-21 LAB
ALBUMIN SERPL BCP-MCNC: 4.7 G/DL (ref 3.5–5.2)
ALP SERPL-CCNC: 94 U/L (ref 55–135)
ALT SERPL W/O P-5'-P-CCNC: 30 U/L (ref 10–44)
ANION GAP SERPL CALC-SCNC: 9 MMOL/L (ref 8–16)
AST SERPL-CCNC: 21 U/L (ref 10–40)
BASOPHILS # BLD AUTO: 0.03 K/UL (ref 0–0.2)
BASOPHILS NFR BLD: 0.8 % (ref 0–1.9)
BILIRUB SERPL-MCNC: 0.9 MG/DL (ref 0.1–1)
BNP SERPL-MCNC: 7 PG/ML (ref 0–99)
BUN SERPL-MCNC: 15 MG/DL (ref 6–20)
CALCIUM SERPL-MCNC: 10.2 MG/DL (ref 8.7–10.5)
CHLORIDE SERPL-SCNC: 102 MMOL/L (ref 95–110)
CO2 SERPL-SCNC: 27 MMOL/L (ref 23–29)
CREAT SERPL-MCNC: 1 MG/DL (ref 0.5–1.4)
DIFFERENTIAL METHOD BLD: ABNORMAL
EOSINOPHIL # BLD AUTO: 0.1 K/UL (ref 0–0.5)
EOSINOPHIL NFR BLD: 1.3 % (ref 0–8)
ERYTHROCYTE [DISTWIDTH] IN BLOOD BY AUTOMATED COUNT: 13.2 % (ref 11.5–14.5)
EST. GFR  (NO RACE VARIABLE): >60 ML/MIN/1.73 M^2
GLUCOSE SERPL-MCNC: 115 MG/DL (ref 70–110)
HCT VFR BLD AUTO: 45.1 % (ref 37–48.5)
HGB BLD-MCNC: 14.2 G/DL (ref 12–16)
IMM GRANULOCYTES # BLD AUTO: 0 K/UL (ref 0–0.04)
IMM GRANULOCYTES NFR BLD AUTO: 0 % (ref 0–0.5)
INFLUENZA A, MOLECULAR: NEGATIVE
INFLUENZA B, MOLECULAR: NEGATIVE
LYMPHOCYTES # BLD AUTO: 1.7 K/UL (ref 1–4.8)
LYMPHOCYTES NFR BLD: 42.8 % (ref 18–48)
MAGNESIUM SERPL-MCNC: 1.8 MG/DL (ref 1.6–2.6)
MCH RBC QN AUTO: 26.4 PG (ref 27–31)
MCHC RBC AUTO-ENTMCNC: 31.5 G/DL (ref 32–36)
MCV RBC AUTO: 84 FL (ref 82–98)
MONOCYTES # BLD AUTO: 0.3 K/UL (ref 0.3–1)
MONOCYTES NFR BLD: 8.8 % (ref 4–15)
NEUTROPHILS # BLD AUTO: 1.8 K/UL (ref 1.8–7.7)
NEUTROPHILS NFR BLD: 46.3 % (ref 38–73)
NRBC BLD-RTO: 0 /100 WBC
PLATELET # BLD AUTO: 242 K/UL (ref 150–450)
PMV BLD AUTO: 10.9 FL (ref 9.2–12.9)
POTASSIUM SERPL-SCNC: 3.6 MMOL/L (ref 3.5–5.1)
PROT SERPL-MCNC: 8.3 G/DL (ref 6–8.4)
RBC # BLD AUTO: 5.37 M/UL (ref 4–5.4)
SARS-COV-2 RDRP RESP QL NAA+PROBE: NEGATIVE
SODIUM SERPL-SCNC: 138 MMOL/L (ref 136–145)
SPECIMEN SOURCE: NORMAL
TROPONIN I SERPL HS-MCNC: 2.5 PG/ML (ref 0–14.9)
TROPONIN I SERPL HS-MCNC: <2.3 PG/ML (ref 0–14.9)
WBC # BLD AUTO: 3.88 K/UL (ref 3.9–12.7)

## 2024-03-21 PROCEDURE — 83880 ASSAY OF NATRIURETIC PEPTIDE: CPT | Performed by: NURSE PRACTITIONER

## 2024-03-21 PROCEDURE — 25000003 PHARM REV CODE 250: Performed by: STUDENT IN AN ORGANIZED HEALTH CARE EDUCATION/TRAINING PROGRAM

## 2024-03-21 PROCEDURE — 84484 ASSAY OF TROPONIN QUANT: CPT | Performed by: NURSE PRACTITIONER

## 2024-03-21 PROCEDURE — 99285 EMERGENCY DEPT VISIT HI MDM: CPT | Mod: 25

## 2024-03-21 PROCEDURE — 80053 COMPREHEN METABOLIC PANEL: CPT | Performed by: NURSE PRACTITIONER

## 2024-03-21 PROCEDURE — 85025 COMPLETE CBC W/AUTO DIFF WBC: CPT | Performed by: NURSE PRACTITIONER

## 2024-03-21 PROCEDURE — 87502 INFLUENZA DNA AMP PROBE: CPT | Performed by: STUDENT IN AN ORGANIZED HEALTH CARE EDUCATION/TRAINING PROGRAM

## 2024-03-21 PROCEDURE — 93005 ELECTROCARDIOGRAM TRACING: CPT | Performed by: INTERNAL MEDICINE

## 2024-03-21 PROCEDURE — 25000003 PHARM REV CODE 250: Performed by: NURSE PRACTITIONER

## 2024-03-21 PROCEDURE — U0002 COVID-19 LAB TEST NON-CDC: HCPCS | Performed by: STUDENT IN AN ORGANIZED HEALTH CARE EDUCATION/TRAINING PROGRAM

## 2024-03-21 PROCEDURE — 93010 ELECTROCARDIOGRAM REPORT: CPT | Mod: ,,, | Performed by: INTERNAL MEDICINE

## 2024-03-21 PROCEDURE — 83735 ASSAY OF MAGNESIUM: CPT | Performed by: NURSE PRACTITIONER

## 2024-03-21 RX ORDER — METHOCARBAMOL 500 MG/1
500 TABLET, FILM COATED ORAL EVERY 8 HOURS PRN
Qty: 15 TABLET | Refills: 0 | Status: SHIPPED | OUTPATIENT
Start: 2024-03-21 | End: 2024-03-26

## 2024-03-21 RX ORDER — ASPIRIN 325 MG
325 TABLET ORAL
Status: COMPLETED | OUTPATIENT
Start: 2024-03-21 | End: 2024-03-21

## 2024-03-21 RX ORDER — METHOCARBAMOL 500 MG/1
1000 TABLET, FILM COATED ORAL
Status: COMPLETED | OUTPATIENT
Start: 2024-03-21 | End: 2024-03-21

## 2024-03-21 RX ADMIN — METHOCARBAMOL 500 MG: 500 TABLET ORAL at 07:03

## 2024-03-21 RX ADMIN — ASPIRIN 325 MG ORAL TABLET 325 MG: 325 PILL ORAL at 03:03

## 2024-03-21 NOTE — FIRST PROVIDER EVALUATION
Emergency Department TeleTriage Encounter Note      CHIEF COMPLAINT    Chief Complaint   Patient presents with    Chest Pain    Cough       VITAL SIGNS   Initial Vitals [03/21/24 1459]   BP Pulse Resp Temp SpO2   (!) 153/97 88 20 97.4 °F (36.3 °C) 100 %      MAP       --            ALLERGIES    Review of patient's allergies indicates:   Allergen Reactions    Hydrocodone Itching    Hydrocodone-acetaminophen      Itchy (skin)^       PROVIDER TRIAGE NOTE  This is a teletriage evaluation of a 49 y.o. female presenting to the ED with c/o intermittent chest pain x 1 week with left arm pain. Limited physical exam via telehealth: The patient is awake, alert, answering questions appropriately and is not in respiratory distress. Initial orders will be placed and care will be transferred to an alternate provider when patient is roomed for a full evaluation. Any additional orders and the final disposition will be determined by that provider.         ORDERS  Labs Reviewed - No data to display    ED Orders (720h ago, onward)      Start Ordered     Status Ordering Provider    03/21/24 1515 03/21/24 1503  aspirin tablet 325 mg  ED 1 Time         Ordered AKASH MCDERMOTT    03/21/24 1504 03/21/24 1503  Magnesium  STAT         Ordered AKASH MCDERMOTT    03/21/24 1504 03/21/24 1503  Cardiac Monitoring - Adult  Continuous        Comments: Notify Physician If:    Ordered AKASH MCDERMOTT    03/21/24 1504 03/21/24 1503  Pulse Oximetry Continuous  Continuous         Ordered AKASH MCDERMOTT    03/21/24 1504 03/21/24 1503  Diet NPO  Diet effective now         Ordered AKASH MCDERMOTT    Unscheduled 03/21/24 1503  Vital signs  Every 15 min       Ordered AKASH MCDERMOTT    Unscheduled 03/21/24 1503  Saline lock IV  Once         Ordered AKASH MCDERMOTT    Unscheduled 03/21/24 1503  EKG 12-lead  Once        Comments: Do not perform if previously done during this visit/ triage    Ordered AKASH MCDERMOTT    Unscheduled 03/21/24 1503  CBC  auto differential  STAT         Ordered AKASH MCDERMOTT    Unscheduled 03/21/24 1503  Comprehensive metabolic panel  STAT         Ordered MCDERMOTT AKASH ЮЛИЯ    Unscheduled 03/21/24 1503  Troponin I High Sensitivity #1  STAT         Ordered AKASH MCDERMOTT    Unscheduled 03/21/24 1503  Troponin I High Sensitivity #2  Once Timed         Ordered AKASH MCDERMOTT    Unscheduled 03/21/24 1503  B-Type natriuretic peptide (BNP)  STAT         Ordered AKASH MCDERMOTT    Unscheduled 03/21/24 1503  X-Ray Chest PA And Lateral  1 time imaging         Ordered AKASH MCDERMOTT    Unscheduled 03/21/24 1503  POCT glucose  Once         Ordered AKASH MCDERMOTT              Virtual Visit Note: The provider triage portion of this emergency department evaluation and documentation was performed via [employer], a HIPAA-compliant telemedicine application, in concert with a tele-presenter in the room. A face to face patient evaluation with one of my colleagues will occur once the patient is placed in an emergency department room.      DISCLAIMER: This note was prepared with [employer] voice recognition transcription software. Garbled syntax, mangled pronouns, and other bizarre constructions may be attributed to that software system.

## 2024-03-22 NOTE — ED PROVIDER NOTES
Encounter Date: 3/21/2024       History     Chief Complaint   Patient presents with    Chest Pain    Cough     HPI  Patient is a 50 y/o F with a pmh of HTN, thyroid disease, diabetes here with left sided chest pain radiated to left upper back and left arm and nonproductive cough x several days. Describes the pain as sharp and intermittent, lasting several seconds at a time. Denies fever, shortness of breath, abdominal pain, nausea, vomiting, light-headedness, syncope.    Review of patient's allergies indicates:   Allergen Reactions    Hydrocodone Itching    Hydrocodone-acetaminophen      Itchy (skin)^     Past Medical History:   Diagnosis Date    Allergies     Diabetes mellitus, type 2     High cholesterol     Hypertension     Thyroid disease      Past Surgical History:   Procedure Laterality Date    BREAST BIOPSY      BREAST CYST ASPIRATION Left 1996    BREAST CYST ASPIRATION Right 2001    ESOPHAGOGASTRODUODENOSCOPY N/A 10/26/2022    Procedure: EGD (ESOPHAGOGASTRODUODENOSCOPY);  Surgeon: Dale Bray MD;  Location: [facility] ENDO;  Service: Endoscopy;  Laterality: N/A;    HYSTERECTOMY      KNEE ARTHROSCOPY W/ MENISCECTOMY Left 01/09/2023    Procedure: ARTHROSCOPY, KNEE, WITH MENISCECTOMY;  Surgeon: Tim Soto MD;  Location: [facility] OR;  Service: Orthopedics;  Laterality: Left;  [employer] video   fast fix    L WRIST SURGERY Left     THYROID SURGERY       Family History   Problem Relation Age of Onset    Diabetes Mother     Diabetes Father     Breast cancer Maternal Aunt      Social History     Tobacco Use    Smoking status: Never    Smokeless tobacco: Never   Substance Use Topics    Alcohol use: Yes     Comment: rare    Drug use: Never     Review of Systems   All other systems reviewed and are negative.      Physical Exam     Initial Vitals [03/21/24 1459]   BP Pulse Resp Temp SpO2   (!) 153/97 88 20 97.4 °F (36.3 °C) 100 %      MAP       --         Physical Exam    Nursing note and vitals  reviewed.  Constitutional: She is not diaphoretic.  Non-toxic appearance.   HENT:   Head: Normocephalic and atraumatic.   Mouth/Throat: Oropharynx is clear and moist.   Eyes: EOM are normal.   Neck: Neck supple.   Normal range of motion.  Cardiovascular:  Normal rate, regular rhythm and normal heart sounds.           No murmur heard.  Pulmonary/Chest: Breath sounds normal. No respiratory distress. She has no wheezes. She has no rhonchi. She has no rales. She exhibits tenderness.   Abdominal: Abdomen is soft. She exhibits no distension. There is no abdominal tenderness. There is no guarding.   Musculoskeletal:      Cervical back: Normal range of motion and neck supple.      Comments: Left upper back tenderness. No deformity to extremities.     Neurological: She is alert and oriented to person, place, and time.   Skin: Skin is warm and dry.   Psychiatric: She has a normal mood and affect. Thought content normal.         ED Course   Procedures  Labs Reviewed   CBC W/ AUTO DIFFERENTIAL - Abnormal; Notable for the following components:       Result Value    WBC 3.88 (*)     MCH 26.4 (*)     MCHC 31.5 (*)     All other components within normal limits   COMPREHENSIVE METABOLIC PANEL - Abnormal; Notable for the following components:    Glucose 115 (*)     All other components within normal limits   MAGNESIUM   TROPONIN I HIGH SENSITIVITY   B-TYPE NATRIURETIC PEPTIDE   TROPONIN I HIGH SENSITIVITY   SARS-COV-2 RNA AMPLIFICATION, QUAL   INFLUENZA A AND B ANTIGEN    Narrative:     Specimen Source->Nasopharyngeal Swab   POCT GLUCOSE MONITORING CONTINUOUS        ECG Results              EKG 12-lead (In process)        Collection Time Result Time QRS Duration [facility] QTC Calculation    03/21/24 15:13:02 03/21/24 16:52:25 86 432                     In process by Interface, Lab In [facility] (03/21/24 16:52:41)                   Narrative:    Test Reason : R07.9,    Vent. Rate : 081 BPM     Atrial Rate : 081 BPM     P-R Int : 158 ms           QRS Dur : 086 ms      QT Int : 372 ms       P-R-T Axes : 075 018 002 degrees     QTc Int : 432 ms    Normal sinus rhythm with sinus arrhythmia  Nonspecific T wave abnormality  Abnormal ECG  When compared with ECG of 03-MAR-2021 11:05,  No significant change was found    Referred By: [email or web address]   SELF           Confirmed By:                       In process by Interface, Lab In [facility] (03/21/24 15:29:25)                   Narrative:    Test Reason : R07.9,    Vent. Rate : 081 BPM     Atrial Rate : 081 BPM     P-R Int : 158 ms          QRS Dur : 086 ms      QT Int : 372 ms       P-R-T Axes : 075 018 002 degrees     QTc Int : 432 ms    Normal sinus rhythm with sinus arrhythmia  Nonspecific T wave abnormality  Abnormal ECG  When compared with ECG of 03-MAR-2021 11:05,  No significant change was found    Referred By: [email or web address]   SELF           Confirmed By:                                   Imaging Results              X-Ray Chest PA And Lateral (Final result)  Result time 03/21/24 15:32:31      Final result by Amaya Martin MD (03/21/24 15:32:31)                   Narrative:    Reason: Chest Pain    FINDINGS:  PA and lateral chest is compared to 3/3/2021 show normal cardiomediastinal silhouette.    Lungs are clear. Pulmonary vasculature is normal. There is rightward curvature of the lower thoracic spine. There are no acute osseous abnormalities.    IMPRESSION: No acute pulmonary process    Electronically signed by:  Amaya Martin MD  03/21/2024 03:32 PM CDT Workstation: [id number]                                     Medications   aspirin tablet 325 mg (325 mg Oral Given 3/21/24 1531)   methocarbamoL tablet 1,000 mg (500 mg Oral Given 3/21/24 1905)     Medical Decision Making  Pt is a 48 y/o F with a pmh of thyroid disease, HTN, DM here with left sided chest pain, left arm and upper back pain, cough. She is non-toxic appearing, hemodynamically stable and in no respiratory distress, afebrile. EKG shows NSR  without STEMI or arrhythmogenic changes, non-specific T wave changes in inferior leads stable from prior EKG from 2021. CXR without acute abnormality. Negative for COVID and influenza. Troponin negative x 2, BNP, mag, CMP wnl. CBC with mild leukopenia, otherwise unremarkable. Pt given robaxin with improvement in pain. Stable for discharge with PCP follow up, prn robaxin. Strict return precautions issued. Pt verbalized understanding and agrees with plan.    Yris Yu MD  EM PGY-4  9:18 PM 03/21/2024        Problems Addressed:  Chest pain: acute illness or injury  Upper back pain on left side: acute illness or injury    Amount and/or Complexity of Data Reviewed  Labs: ordered. Decision-making details documented in ED Course.  Radiology: ordered. Decision-making details documented in ED Course.  ECG/medicine tests: ordered and independent interpretation performed. Decision-making details documented in ED Course.    Risk  Prescription drug management.                                      Clinical Impression:  Final diagnoses:  [R07.9] Chest pain (Primary)  [M54.9] Upper back pain on left side          ED Disposition Condition    Discharge Stable          ED Prescriptions       Medication Sig Dispense Start Date End Date Auth. Provider    methocarbamoL (ROBAXIN) 500 MG Tab Take 1 tablet (500 mg total) by mouth every 8 (eight) hours as needed (Muscle spasms/pain). 15 tablet 3/21/2024 3/26/2024 Yris Yu MD          Follow-up Information       Follow up With Specialties Details Why Contact Info    Tootie Estrella NP Hospitalist, Emergency Medicine Schedule an appointment as soon as possible for a visit in 1 day For follow up for your ER visit [address]  [address]  [facility] [address] [phone]               Yris Yu MD  Resident  03/21/24 7102

## 2024-03-22 NOTE — DISCHARGE INSTRUCTIONS
Your x-ray, EKG, and labs did not show any new abnormalities today. Please follow up with your primary care doctor.    Please return to the ER if you have any of the following:  - Continued chest pain  - Trouble breathing  - Nausea or vomiting  - Severe abdominal pain  - Lightheadedness or if you feel like you are going to pass out  - Fever (temperature of 100.4F or higher)  - Any new or concerning symptoms

## 2024-04-11 LAB
OHS QRS DURATION: 86 MS
OHS QTC CALCULATION: 432 MS

## 2024-04-18 DIAGNOSIS — M25.562 LEFT KNEE PAIN: Primary | ICD-10-CM

## 2024-04-18 DIAGNOSIS — M54.16 LUMBAR RADICULOPATHY: ICD-10-CM

## 2024-04-24 ENCOUNTER — CLINICAL SUPPORT (OUTPATIENT)
Dept: REHABILITATION | Facility: HOSPITAL | Age: 49
End: 2024-04-24
Payer: MEDICAID

## 2024-04-24 DIAGNOSIS — M62.89 MUSCLE TIGHTNESS: ICD-10-CM

## 2024-04-24 DIAGNOSIS — R53.1 DECREASED STRENGTH: Primary | ICD-10-CM

## 2024-04-24 DIAGNOSIS — R26.9 GAIT ABNORMALITY: ICD-10-CM

## 2024-04-24 DIAGNOSIS — M25.60 DECREASED RANGE OF MOTION: ICD-10-CM

## 2024-04-24 PROCEDURE — 97161 PT EVAL LOW COMPLEX 20 MIN: CPT | Mod: PN

## 2024-04-24 PROCEDURE — 97110 THERAPEUTIC EXERCISES: CPT | Mod: PN

## 2024-04-24 NOTE — PLAN OF CARE
[facility]  Physical Therapy Initial Evaluation    Name: Javier Enciso  Clinic Number: [id number]    Therapy Diagnosis:   Encounter Diagnoses   Name Primary?    Decreased strength Yes    Muscle tightness     Decreased range of motion     Gait abnormality      Physician: Order, Paper   Physician Orders: PT Eval and Treat  Medical Diagnosis from Referral: M54.16 (ICD-10-CM) - Lumbar radiculopathy M25.562 (ICD-10-CM) - Left knee pain  Evaluation Date: 4/24/2024  Authorization Period Expiration: 12/31/2024  Plan of Care Expiration: 8/30/2024    Progress Update: 5/24/2024  FOTO: 1 / 3   Visit # / Visits authorized: 1 / 1       PRECAUTIONS: Standard Precautions     Time In: 0655  Time Out: 0700  Total Appointment Time (timed & untimed codes): 55 minutes    SUBJECTIVE     Chief complaint:  L anterior pain    History of current condition:  Pain started about 6 years ago.  Denies specific incident.  Pain intensity and frequency has gotten worse since onset.   States that she has a hard time bearing weight on the leg.  States that she was informed that her cartilage has degenerated.  States that L knee gives way daily and she has fallen once.   States that she feels L knee grinding daily.  States that she received previous steroid injections which did not help at all.  States that she has constant numbness from L knee to foot.  Denies catching, locking.      Previous episode:  Previous menisectomy 2023.      Aggravating Factors: walking, standing, sit to stand, bending knee  Easing Factors:  ibprofen    Imaging:      Impression:     1. Postoperative change of reported chondroplasty with minimal irregularity of the patellofemoral cartilage.  2. Small joint effusion.     Electronically signed by resident: Fantasma Torrez  Date:                                            10/24/2023  Time:                                           12:55     Electronically signed by:Leighton Cox MD  Date:                                             10/24/2023  Time:                                           14:21    Prior Therapy: Yes  Social History: Pt lives with their spouse  Occupation: Pt is unemployed.    Prior Level of Function: Independent with all ADLs  Current Level of Function: 50% of PLOF    Pain:  Current 7 /10, worst 10 /10, best 4 /10   Description: Aching, Dull, and Throbbing    Pts goals: Pt reported goal is to be able to stand with no pain.      _______________________________________________________  Medical History:   Past Medical History:   Diagnosis Date    Allergies     Diabetes mellitus, type 2     High cholesterol     Hypertension     Thyroid disease        Surgical History:   Javier Enciso  has a past surgical history that includes Breast cyst aspiration (Left, 1996); Breast cyst aspiration (Right, 2001); Hysterectomy; Thyroid surgery; L WRIST SURGERY (Left); Esophagogastroduodenoscopy (N/A, 10/26/2022); Knee arthroscopy w/ meniscectomy (Left, 01/09/2023); and Breast biopsy.    Medications:   Javier has a current medication list which includes the following prescription(s): alprazolam, amlodipine, amlodipine, azelastine, bd jeannie 2nd gen pen needle, dexcom g6 sensor, dexcom g6 transmitter, cetirizine, diclofenac sodium, docusate sodium, ergocalciferol, esomeprazole, estradiol, ibuprofen, fiasp u-100 insulin, omnipod 5 g6 pods (gen 5), levothyroxine, losartan, montelukast, pregabalin, promethazine-dextromethorphan, rosuvastatin, tizanidine, tramadol, and xhance.    Allergies:   Review of patient's allergies indicates:   Allergen Reactions    Hydrocodone Itching    Hydrocodone-acetaminophen      Itchy (skin)^        OBJECTIVE   (x = not tested due to pain and/or inability to obtain test position)    RANGE OF MOTION:    Lumbar AROM/PROM Right  (spine)  4/24/2024 Left    4/24/2024 Goal   Lumbar Flexion (60) wfl --- 55     Lumbar Extension (30) wfl --- 30     Lumbar Side Bending (25) wfl wfl 20     Lumbar  Rotation wfl wfl 45         Hip AROM/PROM Right  4/24/2024 Left  4/24/2024 Goal   Hip Flexion (120) wfl wfl 115     Hip IR (45) wfl wfl 40     Hip ER (45) wfl wfl 40         Knee AROM/PROM Right  4/24/2024 Left  4/24/2024 Goal   Hyper - Zero - Flexion wfl 0-0-115 0-0-135  Initial:        STRENGTH:    L/E MMT Right  4/24/2024 Goal   Hip Flexion  4/5 5/5 B    Hip Extension  4-/5 5/5 B   Hip Abduction  4-/5 5/5 B   Hip IR 4-/5 5/5 B   Hip ER 4-/5 5/5 B   Knee Flexion 4/5 5/5 B   Knee Extension 4/5 5/5 B   Ankle DF 4/5 5/5 B   Ankle PF 4/5 5/5 B   Ankle Inversion 4/5 5/5 B   Ankle Eversion 4/5 5/5 B   Big Toe Extension 4/5 5/5 B     L/E MMT Left  4/24/2024 Goal   Hip Flexion  3+/5 5/5 B    Hip Extension  3+/5 5/5 B   Hip Abduction  3+/5 5/5 B   Hip IR 3+/5 5/5 B   Hip ER 3+/5 5/5 B   Knee Flexion 4-/5 5/5 B   Knee Extension 4-/5 5/5 B   Ankle DF 4/5 5/5 B   Ankle PF 4/5 5/5 B   Ankle Inversion 4/5 5/5 B   Ankle Eversion 4/5 5/5 B   Big Toe Extension 4/5 5/5 B       MUSCLE LENGTH:     Muscle Tested  Right  4/24/2024 Left   4/24/2024 Goal   Hip Flexors  decreased decreased Normal B   Quadriceps normal normal Normal B   Hamstrings  decreased decreased Normal B   Piriformis  normal normal Normal B   Gastrocnemius  decreased decreased Normal B   Soleus  decreased decreased Normal B       SPECIAL TESTS:     Right  (spine)  4/24/2024 Left Goal   SLR Negative Negative Negative    Crossover SLR Negative Negative Negative    Slump Negative Negative Negative    90/90 Positive Positive Negative   Oskar Positive Positive Negative   Standing forward flexion test Negative Negative Negative   Sacral thrust Negative Negative Negative   SIJ Distraction Negative Negative Negative   SIJ Compression Negative Negative Negative   Anterior Drawer Negative Negative Negative   Posterior Drawer Negative Negative Negative   Александр Negative Negative Negative       Sensation:  Sensation is intact to light touch    Palpation:  TTP along L medial  "patellar facet.    Posture:  Pt presents with postural abnormalities which include: forward head, rounded shoulders, and ankle/foot pronation    Gait Analysis: The patient ambulated with the following assistive device: none; the pt presents with the following gait abnormalities: decreased step length, maria esther, and arm swing; increased forward flexed posture, trunk sway     Movement Analysis:   Functional Test  Outcome   Double Leg Squat 1/4 squat limited by pain   Single Leg Step Down NT            TREATMENT   Total Treatment time separate from Evaluation: (10) minutes    Yatara received therapeutic exercises to develop strength, endurance, ROM, flexibility, and posture for (10) minutes including: x = exercises performed     TherEx 4/24/2024    Seated hamstring stretch x 3x30 secs   SLRs x 3x10   Quad sets x 3x10          Plan for Next Visit:     Recumbent bike x 10 minutes     Hamstring stretch 3 x 30 seconds bilateral      Quad sets with towel  3x10  Supine heel slides 3x10  SLRs  2x10  Bridges 3x10  Hip adduction with ball  3x10  Clams green Theraband 3 x 10 reps    Double Knee To Chest with Physioball 3x10 reps       Gastroc stretch 3 x 30 seconds bilateral     Step ups 6"  3 x 10 reps bilateral   Hip 3 way 2 x 10 reps Bilateral        Home Exercises and Patient Education Provided    Education/Self-Care provided:  Patient educated on the impairments noted above and the effects of physical therapy intervention to improve overall condition and quality of life.   Patient was educated on all the above exercise prior/during/after for proper posture, positioning, and execution for safe performance with home exercise program.     Written Home Exercises Provided: yes. Prefers: Electronic Copies  Exercises were reviewed and Javier was able to demonstrate them prior to the end of the session.  Javier demonstrated good understanding of the education provided.     See EMR under Patient Instructions for exercises provided " "during therapy sessions.    ASSESSMENT   Javier is a 49 y.o. female referred to outpatient Physical Therapy with a medical diagnosis of M54.16 (ICD-10-CM) - Lumbar radiculopathy M25.562 (ICD-10-CM) - Left knee pain. Javier presents with clinical signs and symptoms that support this diagnosis with decreased L knee ROM, decreased lower extremity strength,  and impaired functional mobility.     The above impairments will be addressed through manual therapy techniques, therapeutic exercises, functional training, and modalities as necessary. Patient was treated and educated on exercises for home program, progression of therapy, and benefits of therapy to achieve full functional mobility. Patient will benefit from skilled physical therapy to meet short and long term goals and return to prior level of function.    Pt prognosis is Good.   Pt will benefit from skilled outpatient Physical Therapy to address the deficits stated above and in the chart below, provide pt/family education, and to maximize pt's level of independence.     Plan of care discussed with patient: Yes  Pt's spiritual, cultural and educational needs considered and patient is agreeable to the plan of care and goals as stated below:     Anticipated Barriers for therapy: none    Medical Necessity is demonstrated by the following:   History  Co-morbidities and personal factors that may impact the plan of care Co-morbidities:   advanced age    Personal Factors:   no deficits     low   Examination  Body Structures and Functions, activity limitations and participation restrictions that may impact the plan of care Body Regions:   lower extremities    Body Systems:    gross symmetry  ROM  strength  gross coordinated movement  balance  gait  motor control  motor learning    Participation Restrictions:   See above in "Current Level of Function"     Activity limitations:   Learning and applying knowledge  no deficits    General Tasks and Commands  no " deficits    Communication  no deficits    Mobility  no deficits    Self care  no deficits    Domestic Life  no deficits    Interactions/Relationships  no deficits    Life Areas  no deficits    Community and Social Life  community life  recreation and leisure  no deficits         low   Clinical Presentation stable and uncomplicated low   Decision Making/ Complexity Score: low       GOALS:  SHORT TERM GOALS: 4 weeks 4/24/2024   Recent signs and systems trend is improving in order to progress towards LTG's.    Patient will be independent with HEP in order to further progress and return to maximal function.    Pain rating at Worst: 5/10 in order to progress towards increased independence with activity.    Patient will be able to correct postural deviations in sitting and standing, to decrease pain and promote postural awareness for injury prevention.       LONG TERM GOALS: 8 weeks 4/24/2024   Patient will return to normal ADL, recreational, and work related activities with less pain and limitation.     Patient will improve AROM to stated goals in order to return to maximal functional potential.     Patient will improve Strength to stated goals of appropriate musculature in order to improve functional independence.     Pain Rating at Best: 1/10 to improve Quality of Life.     Patient will meet predicted functional outcome (FOTO) score: 80% to increase self-worth & perceived functional ability.    Patient will have met/partially met personal goal of being able to stand without pain.          PLAN   Plan of care Certification: 4/24/2024 to 8/30/2024    Outpatient Physical Therapy 2 times weekly for 6 weeks to include any combination of the following interventions: virtual visits, dry needling, modalities, electrical stimulation (IFC, Pre-Mod, Attended with Functional Dry Needling), Manual Therapy, Moist Heat/ Ice, Neuromuscular Re-ed, Patient Education, Self Care, Therapeutic Exercise, Functional Training, and Therapeutic  Activites     Thank you for this referral.    These services are reasonable and necessary for the conditions set forth above while under my care.    Main Vivas, PT, DPT

## 2024-04-29 ENCOUNTER — CLINICAL SUPPORT (OUTPATIENT)
Dept: REHABILITATION | Facility: HOSPITAL | Age: 49
End: 2024-04-29
Payer: MEDICAID

## 2024-04-29 DIAGNOSIS — M62.89 MUSCLE TIGHTNESS: ICD-10-CM

## 2024-04-29 DIAGNOSIS — M25.60 DECREASED RANGE OF MOTION: ICD-10-CM

## 2024-04-29 DIAGNOSIS — R53.1 DECREASED STRENGTH: Primary | ICD-10-CM

## 2024-04-29 PROCEDURE — 97110 THERAPEUTIC EXERCISES: CPT | Mod: PN

## 2024-04-29 NOTE — PROGRESS NOTES
"[facility] / [facility]  Physical Therapy Daily Treatment Note     Name: Javier Enciso  Clinic Number: [id number]    Therapy Diagnosis:   Encounter Diagnoses   Name Primary?    Decreased strength Yes    Muscle tightness     Decreased range of motion      Physician: Order, Paper    Visit Date: 4/29/2024    Physician: Order, Paper          Physician Orders: PT Eval and Treat  Medical Diagnosis from Referral: M54.16 (ICD-10-CM) - Lumbar radiculopathy M25.562 (ICD-10-CM) - Left knee pain  Evaluation Date: 4/24/2024  Authorization Period Expiration: 12/31/2024  Plan of Care Expiration: 8/30/2024                Progress Update: 5/24/2024             FOTO: 1 / 3   Visit # / Visits authorized: 1 / 12           Time In: 0759  (Pnt arrived early)  Time Out: 0852  Total Billable Time: 53 minutes    Precautions: Standard  Insurance: Payor: MEDICAID / Plan: [place] [employer] CONNECT / Product Type: Managed Medicaid /     Subjective     Pt reports: that she is about the same as last visit.  She was compliant with home exercise program.  Response to previous treatment: n/a  Functional change: n/a    Pain: 7/10  Location: left knee      Objective     Javier received therapeutic exercises to develop strength, endurance, ROM, flexibility, posture, and core stabilization for 53 minutes including:    Recumbent bike x 10 minutes     Hamstring stretch 3 x 30 seconds bilateral      Quad sets with towel  3x10  Supine heel slides 3x10  SLRs  2x10  Bridges 3x10  Hip adduction with ball  3x10  Clams green Theraband 3 x 10 reps    Double Knee To Chest with Physioball 3x10 reps       Gastroc stretch 3 x 30 seconds bilateral     Step ups 6"  3 x 10 reps bilateral   Hip 3 way 2 x 10 reps Bilateral     Home Exercises Provided and Patient Education Provided     Education provided:   - low impact cardio options.    Written Home Exercises Provided: yes.  Exercises were reviewed and Javier was able to demonstrate them prior to the end " of the session.  Javier demonstrated good  understanding of the education provided.     See EMR under Patient Instructions for exercises provided prior visit.    Assessment     Presents with guarded movements and limited by pain.  Will continue to progress with therapy.  Javier is progressing well towards her goals.   Pt prognosis is Good.     Pt will continue to benefit from skilled outpatient physical therapy to address the deficits listed in the problem list box on initial evaluation, provide pt/family education and to maximize pt's level of independence in the home and community environment.     Pt's spiritual, cultural and educational needs considered and pt agreeable to plan of care and goals.    Anticipated barriers to physical therapy: None    Goals:     SHORT TERM GOALS: 4 weeks 4/29/2024     Recent signs and systems trend is improving in order to progress towards LTG's. ongoing   Patient will be independent with HEP in order to further progress and return to maximal function. ongoing   Pain rating at Worst: 5/10 in order to progress towards increased independence with activity. ongoing   Patient will be able to correct postural deviations in sitting and standing, to decrease pain and promote postural awareness for injury prevention.  ongoing      LONG TERM GOALS: 8 weeks 4/29/2024     Patient will return to normal ADL, recreational, and work related activities with less pain and limitation.  ongoing   Patient will improve AROM to stated goals in order to return to maximal functional potential.  ongoing   Patient will improve Strength to stated goals of appropriate musculature in order to improve functional independence.  ongoing   Pain Rating at Best: 1/10 to improve Quality of Life.  ongoing   Patient will meet predicted functional outcome (FOTO) score: 80% to increase self-worth & perceived functional ability. ongoing   Patient will have met/partially met personal goal of being able to stand without  pain. ongoing       Plan     Continue POC as previously stated.    Main Vivas, PT

## 2024-05-01 ENCOUNTER — CLINICAL SUPPORT (OUTPATIENT)
Dept: REHABILITATION | Facility: HOSPITAL | Age: 49
End: 2024-05-01
Payer: MEDICAID

## 2024-05-01 DIAGNOSIS — M62.89 MUSCLE TIGHTNESS: ICD-10-CM

## 2024-05-01 DIAGNOSIS — M25.60 DECREASED RANGE OF MOTION: ICD-10-CM

## 2024-05-01 DIAGNOSIS — R53.1 DECREASED STRENGTH: Primary | ICD-10-CM

## 2024-05-01 PROCEDURE — 97110 THERAPEUTIC EXERCISES: CPT | Mod: PN

## 2024-05-01 NOTE — PROGRESS NOTES
"[facility] / [facility]  Physical Therapy Daily Treatment Note     Name: Javier Enciso  Clinic Number: [id number]    Therapy Diagnosis:   Encounter Diagnoses   Name Primary?    Decreased strength Yes    Muscle tightness     Decreased range of motion      Physician: Order, Paper    Visit Date: 5/1/2024    Physician: Order, Paper          Physician Orders: PT Eval and Treat  Medical Diagnosis from Referral: M54.16 (ICD-10-CM) - Lumbar radiculopathy M25.562 (ICD-10-CM) - Left knee pain  Evaluation Date: 4/24/2024  Authorization Period Expiration: 12/31/2024  Plan of Care Expiration: 8/30/2024                Progress Update: 5/24/2024             FOTO: 1 / 3   Visit # / Visits authorized: 2 / 12           Time In: 0815  (Pnt arrived early)  Time Out: 0913  Total Billable Time: 53 minutes    Precautions: Standard  Insurance: Payor: MEDICAID / Plan: [place] [employer] CONNECT / Product Type: Managed Medicaid /     Subjective     Pt reports: that her knee always hurts no better  She was compliant with home exercise program.  Response to previous treatment: n/a  Functional change: n/a    Pain: 7/10  Location: left knee      Objective     Javier received therapeutic exercises to develop strength, endurance, ROM, flexibility, posture, and core stabilization for 53 minutes including:    Recumbent bike x 10 minutes     Hamstring stretch 3 x 30 seconds bilateral      Quad sets with towel  3x10  Supine heel slides 3x10  SLRs  2x10  Bridges 3x10  Hip adduction with ball  3x10  Clams green Theraband 3 x 10 reps    Double Knee To Chest with Physioball 3x10 reps       Gastroc stretch 3 x 30 seconds bilateral     Step ups 6"  3 x 10 reps bilateral   Hip 3 way 2 x 10 reps Bilateral     Home Exercises Provided and Patient Education Provided     Education provided:   - low impact cardio options.    Written Home Exercises Provided: yes.  Exercises were reviewed and Javier was able to demonstrate them prior to the end of the " session.  Javier demonstrated good  understanding of the education provided.     See EMR under Patient Instructions for exercises provided prior visit.    Assessment     Presents with guarded movements and limited by pain. Audible crepitus with Knee flexion. Will continue to progress with therapy.  Javier is progressing well towards her goals.   Pt prognosis is Good.     Pt will continue to benefit from skilled outpatient physical therapy to address the deficits listed in the problem list box on initial evaluation, provide pt/family education and to maximize pt's level of independence in the home and community environment.     Pt's spiritual, cultural and educational needs considered and pt agreeable to plan of care and goals.    Anticipated barriers to physical therapy: None    Goals:     SHORT TERM GOALS: 4 weeks 5/1/2024     Recent signs and systems trend is improving in order to progress towards LTG's. ongoing   Patient will be independent with HEP in order to further progress and return to maximal function. ongoing   Pain rating at Worst: 5/10 in order to progress towards increased independence with activity. ongoing   Patient will be able to correct postural deviations in sitting and standing, to decrease pain and promote postural awareness for injury prevention.  ongoing      LONG TERM GOALS: 8 weeks 5/1/2024     Patient will return to normal ADL, recreational, and work related activities with less pain and limitation.  ongoing   Patient will improve AROM to stated goals in order to return to maximal functional potential.  ongoing   Patient will improve Strength to stated goals of appropriate musculature in order to improve functional independence.  ongoing   Pain Rating at Best: 1/10 to improve Quality of Life.  ongoing   Patient will meet predicted functional outcome (FOTO) score: 80% to increase self-worth & perceived functional ability. ongoing   Patient will have met/partially met personal goal of being  able to stand without pain. ongoing       Plan     Continue POC as previously stated.    Geovany Watts, PT

## 2024-05-06 ENCOUNTER — CLINICAL SUPPORT (OUTPATIENT)
Dept: REHABILITATION | Facility: HOSPITAL | Age: 49
End: 2024-05-06
Payer: MEDICAID

## 2024-05-06 DIAGNOSIS — M25.60 DECREASED RANGE OF MOTION: ICD-10-CM

## 2024-05-06 DIAGNOSIS — R53.1 DECREASED STRENGTH: Primary | ICD-10-CM

## 2024-05-06 DIAGNOSIS — M62.89 MUSCLE TIGHTNESS: ICD-10-CM

## 2024-05-06 PROCEDURE — 97110 THERAPEUTIC EXERCISES: CPT | Mod: PN

## 2024-05-06 NOTE — PROGRESS NOTES
"[facility] / [facility]  Physical Therapy Daily Treatment Note     Name: Javier Enciso  Clinic Number: [id number]    Therapy Diagnosis:   Encounter Diagnoses   Name Primary?    Decreased strength Yes    Muscle tightness     Decreased range of motion      Physician: Order, Paper    Visit Date: 5/6/2024    Physician: Order, Paper          Physician Orders: PT Eval and Treat  Medical Diagnosis from Referral: M54.16 (ICD-10-CM) - Lumbar radiculopathy M25.562 (ICD-10-CM) - Left knee pain  Evaluation Date: 4/24/2024  Authorization Period Expiration: 12/31/2024  Plan of Care Expiration: 8/30/2024                Progress Update: 5/24/2024             FOTO: 1 / 3   Visit # / Visits authorized: 2 / 12           Time In: 1025  (Pnt arrived early)  Time Out: 1118  Total Billable Time: 53 minutes    Precautions: Standard  Insurance: Payor: MEDICAID / [place] [employer] [place] / Product Type: Managed Medicaid /     Subjective     Pt reports: that her knee always hurts no better  She was compliant with home exercise program.  Response to previous treatment: n/a  Functional change: n/a    Pain: 7/10  Location: left knee      Objective     Javier received therapeutic exercises to develop strength, endurance, ROM, flexibility, posture, and core stabilization for 53 minutes including:    Nustep x 10 minutes     Hamstring stretch 3 x 30 seconds bilateral      Quad sets with towel  3x10  Supine heel slides 3x10  SLRs  2x10  Bridges 3x10  Hip adduction with ball  3x10  Clams green Theraband 3 x 10 reps    Double Knee To Chest with Physioball 3x10 reps       Gastroc stretch 3 x 30 seconds bilateral     Step ups 6"  3 x 10 reps bilateral   Hip 3 way 2 x 10 reps Bilateral     Home Exercises Provided and Patient Education Provided     Education provided:   - low impact cardio options.    Written Home Exercises Provided: yes.  Exercises were reviewed and Javier was able to demonstrate them prior to the end of the " session.  Javier demonstrated good  understanding of the education provided.     See EMR under Patient Instructions for exercises provided prior visit.    Assessment     Presents with guarded movements and limited by pain. Audible crepitus with Knee flexion. Will continue to progress with therapy.  Javier is progressing well towards her goals.   Pt prognosis is Good.     Pt will continue to benefit from skilled outpatient physical therapy to address the deficits listed in the problem list box on initial evaluation, provide pt/family education and to maximize pt's level of independence in the home and community environment.     Pt's spiritual, cultural and educational needs considered and pt agreeable to plan of care and goals.    Anticipated barriers to physical therapy: None    Goals:     SHORT TERM GOALS: 4 weeks 5/6/2024     Recent signs and systems trend is improving in order to progress towards LTG's. ongoing   Patient will be independent with HEP in order to further progress and return to maximal function. ongoing   Pain rating at Worst: 5/10 in order to progress towards increased independence with activity. ongoing   Patient will be able to correct postural deviations in sitting and standing, to decrease pain and promote postural awareness for injury prevention.  ongoing      LONG TERM GOALS: 8 weeks 5/6/2024     Patient will return to normal ADL, recreational, and work related activities with less pain and limitation.  ongoing   Patient will improve AROM to stated goals in order to return to maximal functional potential.  ongoing   Patient will improve Strength to stated goals of appropriate musculature in order to improve functional independence.  ongoing   Pain Rating at Best: 1/10 to improve Quality of Life.  ongoing   Patient will meet predicted functional outcome (FOTO) score: 80% to increase self-worth & perceived functional ability. ongoing   Patient will have met/partially met personal goal of being  able to stand without pain. ongoing       Plan     Continue POC as previously stated.    Main Vivas, PT

## 2024-05-08 ENCOUNTER — CLINICAL SUPPORT (OUTPATIENT)
Dept: REHABILITATION | Facility: HOSPITAL | Age: 49
End: 2024-05-08
Payer: MEDICAID

## 2024-05-08 ENCOUNTER — TELEPHONE (OUTPATIENT)
Dept: SURGERY | Facility: CLINIC | Age: 49
End: 2024-05-08
Payer: MEDICAID

## 2024-05-08 ENCOUNTER — PATIENT MESSAGE (OUTPATIENT)
Dept: SURGERY | Facility: CLINIC | Age: 49
End: 2024-05-08
Payer: MEDICAID

## 2024-05-08 DIAGNOSIS — M25.60 DECREASED RANGE OF MOTION: ICD-10-CM

## 2024-05-08 DIAGNOSIS — M62.89 MUSCLE TIGHTNESS: ICD-10-CM

## 2024-05-08 DIAGNOSIS — R53.1 DECREASED STRENGTH: Primary | ICD-10-CM

## 2024-05-08 PROCEDURE — 97110 THERAPEUTIC EXERCISES: CPT | Mod: PN

## 2024-05-08 RX ORDER — SODIUM, POTASSIUM,MAG SULFATES 17.5-3.13G
1 SOLUTION, RECONSTITUTED, ORAL ORAL DAILY
Qty: 1 KIT | Refills: 0 | Status: SHIPPED | OUTPATIENT
Start: 2024-05-08 | End: 2024-05-11

## 2024-05-08 NOTE — PROGRESS NOTES
"[facility] / [facility]  Physical Therapy Daily Treatment Note     Name: Javier Enciso  Clinic Number: [id number]    Therapy Diagnosis:   Encounter Diagnoses   Name Primary?    Decreased strength Yes    Muscle tightness     Decreased range of motion      Physician: Order, Paper    Visit Date: 5/8/2024    Physician: Order, Paper          Physician Orders: PT Eval and Treat  Medical Diagnosis from Referral: M54.16 (ICD-10-CM) - Lumbar radiculopathy M25.562 (ICD-10-CM) - Left knee pain  Evaluation Date: 4/24/2024  Authorization Period Expiration: 12/31/2024  Plan of Care Expiration: 8/30/2024                Progress Update: 5/24/2024             FOTO: 1 / 3   Visit # / Visits authorized: 2 / 12           Time In: 0955  (Pnt arrived early)  Time Out: 1035  Total Billable Time: 40 minutes    Precautions: Standard  Insurance: Payor: MEDICAID / Plan: [place] [employer] CONNECT / Product Type: Managed Medicaid /     Subjective     Pt reports: that her knee feels like it is getting worse and her L ankle is now bothering her.  She was compliant with home exercise program.  Response to previous treatment: n/a  Functional change: n/a    Pain: 8/10  Location: left knee      Objective     Javier received therapeutic exercises to develop strength, endurance, ROM, flexibility, posture, and core stabilization for 40 minutes including:    Nustep x 10 minutes     Hamstring stretch 3 x 30 seconds bilateral - np     Quad sets with towel  3x10  Supine heel slides 2x10  SLRs  2x10 - unable to perform due to pain  Bridges 3x10  Hip adduction with ball  2x10   Clams green Theraband 3 x 10 reps    Double Knee To Chest with Physioball 2x10 reps       Gastroc stretch 3 x 30 seconds bilateral - np     Step ups 6"  3 x 10 reps bilateral - np  Hip 3 way 2 x 10 reps Bilateral - np    Home Exercises Provided and Patient Education Provided     Education provided:   - low impact cardio options.    Written Home Exercises Provided: " yes.  Exercises were reviewed and Javier was able to demonstrate them prior to the end of the session.  Javier demonstrated good  understanding of the education provided.     See EMR under Patient Instructions for exercises provided prior visit.    Assessment     Presents with guarded movements and limited by pain. Audible crepitus with Knee flexion. Pnt had to discontinue therapy due to increase in pain.  Will continue to progress with therapy.  Javier is progressing well towards her goals.   Pt prognosis is Good.     Pt will continue to benefit from skilled outpatient physical therapy to address the deficits listed in the problem list box on initial evaluation, provide pt/family education and to maximize pt's level of independence in the home and community environment.     Pt's spiritual, cultural and educational needs considered and pt agreeable to plan of care and goals.    Anticipated barriers to physical therapy: None    Goals:     SHORT TERM GOALS: 4 weeks 5/8/2024     Recent signs and systems trend is improving in order to progress towards LTG's. ongoing   Patient will be independent with HEP in order to further progress and return to maximal function. ongoing   Pain rating at Worst: 5/10 in order to progress towards increased independence with activity. ongoing   Patient will be able to correct postural deviations in sitting and standing, to decrease pain and promote postural awareness for injury prevention.  ongoing      LONG TERM GOALS: 8 weeks 5/8/2024     Patient will return to normal ADL, recreational, and work related activities with less pain and limitation.  ongoing   Patient will improve AROM to stated goals in order to return to maximal functional potential.  ongoing   Patient will improve Strength to stated goals of appropriate musculature in order to improve functional independence.  ongoing   Pain Rating at Best: 1/10 to improve Quality of Life.  ongoing   Patient will meet predicted functional  outcome (FOTO) score: 80% to increase self-worth & perceived functional ability. ongoing   Patient will have met/partially met personal goal of being able to stand without pain. ongoing       Plan     Continue POC as previously stated.    Main Vivas, PT

## 2024-05-08 NOTE — TELEPHONE ENCOUNTER
Called patient in reference to a referral to Colorectal Surgery for colon cancer screening. Patient verbally consented to a Colonoscopy and requested to be scheduled for a Colonoscopy on 05/29/2024 Patient was advised a designated  is required on the day of the Colonoscopy to drive the patient home and the  must be at least. 18 years old.Colonoscopy Prep instructions were thoroughly explained and discussed with the patient.It was emphasized, and reiterated to the patient, to please not to follow the bowel prep instructions that comes with the bowel prep package.However, to please follow the prep instructions that will be received in the mail,or via the Where portal, or by both modes of delivery, which ever method of delivery the patient prefers,from the [facility]   Patient acknowledges understanding Prep instructions as explained and discussed on the phone.. Patient was advised the Colonoscopy Prep instructions discussed and explained on the phone,are being mailed out to the patient's verified address on file,or put onto the Where portal,or both methods of delivery, whichever the patient prefers. Patient's address on file was verified with the patient for accuracy of mailing. Patient's medications on file was reviewed with the patient for accuracy of information. Patient denies taking  any other medications other than those listed and verified on medication profile.Patient was explained the Colonoscopy will be performed here at [facility]. Patient was further explained the Pre-Op will call one day prior to the procedure date, to discuss Pre-Op instructions;and what time to report for the Colonoscopy. The patient was given the opportunity to ask any questions about the Colonoscopy. No further issues were discussed.

## 2024-05-08 NOTE — TELEPHONE ENCOUNTER
The patient has been advised the Colonoscopy Prep Kit will be ordered from the patient's verified preferred pharmacy on file. The medication can  be picked up by the patient, or the patient's designated representative.The patient was further explained the Colonoscopy Prep instructions will be mailed to the patient verified mailing address on file, or put onto the [employer] portal, whichever method of delivery the patient prefers.Additionally this patient was informed,not to follow the instructions that comes with the bowel prep medication. However, the patient was instructed to please follow the Colonoscopy Bowel Prep instructions that's being provided by the . The patient was asked to please to follow the Colonoscopy Prep instructions being provided as precisely,and  meticulously as possible.The patient was advised you  will receive a follow up phone call to summarize the Colonoscopy Prep instructions prior to the scheduled Colonoscopy procedure date. At this time the patient will be given an opportunity to ask any questions regarding the Colonoscopy procedure, and it's associated Bowel Prep instructions.

## 2024-05-13 ENCOUNTER — CLINICAL SUPPORT (OUTPATIENT)
Dept: REHABILITATION | Facility: HOSPITAL | Age: 49
End: 2024-05-13
Payer: MEDICAID

## 2024-05-13 DIAGNOSIS — M62.89 MUSCLE TIGHTNESS: ICD-10-CM

## 2024-05-13 DIAGNOSIS — M25.60 DECREASED RANGE OF MOTION: ICD-10-CM

## 2024-05-13 DIAGNOSIS — R53.1 DECREASED STRENGTH: Primary | ICD-10-CM

## 2024-05-13 PROCEDURE — 97110 THERAPEUTIC EXERCISES: CPT | Mod: PN

## 2024-05-13 NOTE — PROGRESS NOTES
"[facility] / [facility]  Physical Therapy Daily Treatment Note     Name: Javier Enciso  Clinic Number: [id number]    Therapy Diagnosis:   Encounter Diagnoses   Name Primary?    Decreased strength Yes    Muscle tightness     Decreased range of motion      Physician: Order, Paper    Visit Date: 5/13/2024    Physician: Order, Paper          Physician Orders: PT Eval and Treat  Medical Diagnosis from Referral: M54.16 (ICD-10-CM) - Lumbar radiculopathy M25.562 (ICD-10-CM) - Left knee pain  Evaluation Date: 4/24/2024  Authorization Period Expiration: 12/31/2024  Plan of Care Expiration: 8/30/2024                Progress Update: 5/24/2024             FOTO: 1 / 3   Visit # / Visits authorized: 2 / 12           Time In: 0950  (Pnt arrived early)  Time Out: 1030  Total Billable Time: 40 minutes    Precautions: Standard  Insurance: Payor: MEDICAID / Plan: [place] [employer] [place] / Product Type: Managed Medicaid /     Subjective     Pt reports: that she is having a really bad day today.  States that she is in a lot of pain.  States that she see has a f/u with her surgeon tomorrow to discuss further options.  She was compliant with home exercise program.  Response to previous treatment: n/a  Functional change: n/a    Pain: 9/10  Location: left knee      Objective     Javier received therapeutic exercises to develop strength, endurance, ROM, flexibility, posture, and core stabilization for 40 minutes including:    Nustep x 10 minutes     Hamstring stretch 3 x 30 seconds bilateral - np     Quad sets with towel  3x10  Supine heel slides 2x10  SLRs  2x10 - unable to perform due to pain  Bridges 3x10  Hip adduction with ball  2x10   Clams green Theraband 3 x 10 reps    Double Knee To Chest with Physioball 2x10 reps       Gastroc stretch 3 x 30 seconds bilateral - np     Step ups 6"  3 x 10 reps bilateral - np  Hip 3 way 2 x 10 reps Bilateral - np    Home Exercises Provided and Patient Education Provided "     Education provided:   - low impact cardio options.    Written Home Exercises Provided: yes.  Exercises were reviewed and Javier was able to demonstrate them prior to the end of the session.  Javier demonstrated good  understanding of the education provided.     See EMR under Patient Instructions for exercises provided prior visit.    Assessment     Presents with guarded movements and limited by pain. Audible crepitus with Knee flexion. Pnt had to discontinue therapy due to increase in pain.  Will continue to progress with therapy.  Javier is progressing well towards her goals.   Pt prognosis is Good.     Pt will continue to benefit from skilled outpatient physical therapy to address the deficits listed in the problem list box on initial evaluation, provide pt/family education and to maximize pt's level of independence in the home and community environment.     Pt's spiritual, cultural and educational needs considered and pt agreeable to plan of care and goals.    Anticipated barriers to physical therapy: None    Goals:     SHORT TERM GOALS: 4 weeks 5/13/2024     Recent signs and systems trend is improving in order to progress towards LTG's. ongoing   Patient will be independent with HEP in order to further progress and return to maximal function. ongoing   Pain rating at Worst: 5/10 in order to progress towards increased independence with activity. ongoing   Patient will be able to correct postural deviations in sitting and standing, to decrease pain and promote postural awareness for injury prevention.  ongoing      LONG TERM GOALS: 8 weeks 5/13/2024     Patient will return to normal ADL, recreational, and work related activities with less pain and limitation.  ongoing   Patient will improve AROM to stated goals in order to return to maximal functional potential.  ongoing   Patient will improve Strength to stated goals of appropriate musculature in order to improve functional independence.  ongoing   Pain  Rating at Best: 1/10 to improve Quality of Life.  ongoing   Patient will meet predicted functional outcome (FOTO) score: 80% to increase self-worth & perceived functional ability. ongoing   Patient will have met/partially met personal goal of being able to stand without pain. ongoing       Plan     Continue POC as previously stated.    Main Vivas, PT

## 2024-05-14 ENCOUNTER — HOSPITAL ENCOUNTER (OUTPATIENT)
Dept: RADIOLOGY | Facility: HOSPITAL | Age: 49
Discharge: HOME | End: 2024-05-14
Attending: ORTHOPAEDIC SURGERY
Payer: MEDICAID

## 2024-05-14 ENCOUNTER — OFFICE VISIT (OUTPATIENT)
Dept: ORTHOPEDICS | Facility: CLINIC | Age: 49
End: 2024-05-14
Payer: MEDICAID

## 2024-05-14 VITALS — WEIGHT: 220 LBS | BODY MASS INDEX: 29.8 KG/M2 | HEIGHT: 72 IN

## 2024-05-14 DIAGNOSIS — S83.522A TEAR OF PCL (POSTERIOR CRUCIATE LIGAMENT) OF KNEE, LEFT, INITIAL ENCOUNTER: Primary | ICD-10-CM

## 2024-05-14 DIAGNOSIS — M17.12 ARTHRITIS OF LEFT KNEE: ICD-10-CM

## 2024-05-14 DIAGNOSIS — S83.522A TEAR OF PCL (POSTERIOR CRUCIATE LIGAMENT) OF KNEE, LEFT, INITIAL ENCOUNTER: ICD-10-CM

## 2024-05-14 DIAGNOSIS — M54.9 DORSALGIA, UNSPECIFIED: ICD-10-CM

## 2024-05-14 PROCEDURE — 99999PBSHW PR PBB SHADOW TECHNICAL ONLY FILED TO HB: Mod: PBBFAC,,,

## 2024-05-14 PROCEDURE — 20610 DRAIN/INJ JOINT/BURSA W/O US: CPT | Mod: PBBFAC,PO,LT | Performed by: ORTHOPAEDIC SURGERY

## 2024-05-14 PROCEDURE — 20610 DRAIN/INJ JOINT/BURSA W/O US: CPT | Mod: S$PBB,LT,, | Performed by: ORTHOPAEDIC SURGERY

## 2024-05-14 PROCEDURE — 73562 X-RAY EXAM OF KNEE 3: CPT | Mod: TC,PO,RT

## 2024-05-14 PROCEDURE — 3066F NEPHROPATHY DOC TX: CPT | Mod: CPTII,,, | Performed by: ORTHOPAEDIC SURGERY

## 2024-05-14 PROCEDURE — 99214 OFFICE O/P EST MOD 30 MIN: CPT | Mod: 25,S$PBB,, | Performed by: ORTHOPAEDIC SURGERY

## 2024-05-14 PROCEDURE — 3044F HG A1C LEVEL LT 7.0%: CPT | Mod: CPTII,,, | Performed by: ORTHOPAEDIC SURGERY

## 2024-05-14 PROCEDURE — 73564 X-RAY EXAM KNEE 4 OR MORE: CPT | Mod: 26,LT,, | Performed by: RADIOLOGY

## 2024-05-14 PROCEDURE — 4010F ACE/ARB THERAPY RXD/TAKEN: CPT | Mod: CPTII,,, | Performed by: ORTHOPAEDIC SURGERY

## 2024-05-14 PROCEDURE — 99999 PR PBB SHADOW E&M-EST. PATIENT-LVL III: CPT | Mod: PBBFAC,,, | Performed by: ORTHOPAEDIC SURGERY

## 2024-05-14 PROCEDURE — 3008F BODY MASS INDEX DOCD: CPT | Mod: CPTII,,, | Performed by: ORTHOPAEDIC SURGERY

## 2024-05-14 PROCEDURE — 99213 OFFICE O/P EST LOW 20 MIN: CPT | Mod: PBBFAC,25,PO | Performed by: ORTHOPAEDIC SURGERY

## 2024-05-14 PROCEDURE — 3061F NEG MICROALBUMINURIA REV: CPT | Mod: CPTII,,, | Performed by: ORTHOPAEDIC SURGERY

## 2024-05-14 PROCEDURE — 73562 X-RAY EXAM OF KNEE 3: CPT | Mod: 26,59,RT, | Performed by: RADIOLOGY

## 2024-05-14 RX ORDER — TRIAMCINOLONE ACETONIDE 40 MG/ML
40 INJECTION, SUSPENSION INTRA-ARTICULAR; INTRAMUSCULAR
Status: DISCONTINUED | OUTPATIENT
Start: 2024-05-14 | End: 2024-05-14 | Stop reason: HOSPADM

## 2024-05-14 RX ADMIN — TRIAMCINOLONE ACETONIDE 40 MG: 40 INJECTION, SUSPENSION INTRA-ARTICULAR; INTRAMUSCULAR at 09:05

## 2024-05-14 NOTE — PROCEDURES
Large Joint Aspiration/Injection: L knee joint    Date/Time: 5/14/2024 9:30 AM    Performed by: Tim Soto MD  Authorized by: Tim Soto MD    Consent Done?:  Yes (Verbal)  Indications:  Pain and diagnostic evaluation  Site marked: the procedure site was marked    Timeout: prior to procedure the correct patient, procedure, and site was verified    Prep: patient was prepped and draped in usual sterile fashion    Local anesthetic:  Lidocaine 1% without epinephrine  Anesthetic total (ml):  3      Details:  Needle Size:  22 G  Approach:  Anterolateral  Location:  Knee  Site:  L knee joint  Medications:  40 mg triamcinolone acetonide 40 mg/mL  Patient tolerance:  Patient tolerated the procedure well with no immediate complications

## 2024-05-14 NOTE — PROGRESS NOTES
Patient ID: Javier Enciso is a 49 y.o. female    Chief Complaint:   Chief Complaint   Patient presents with    Left Knee - Pain       History of Present Illness:    Pleasant 49-year-old female with diabetes who is status post knee arthroscopy by me with chondroplasty about a year and a half ago.  Postoperatively has continued to suffer with knee and leg pain.  Has done physical therapy as well as to steroid injections of the.  She does not remember if this gave her any relief.  Most recent injection was about 6 months ago.  She reports pain that radiates from the knee down to the medial leg as well as the case in the thigh.  She has done physical therapy as well as been seen by another orthopedic office.  Physical therapy is not helping and she states that is making her pain worse.    PAST MEDICAL HISTORY:   Past Medical History:   Diagnosis Date    Allergies     Diabetes mellitus, type 2     High cholesterol     Hypertension     Thyroid disease      PAST SURGICAL HISTORY:   Past Surgical History:   Procedure Laterality Date    BREAST BIOPSY      BREAST CYST ASPIRATION Left 1996    BREAST CYST ASPIRATION Right 2001    ESOPHAGOGASTRODUODENOSCOPY N/A 10/26/2022    Procedure: EGD (ESOPHAGOGASTRODUODENOSCOPY);  Surgeon: Dale Bray MD;  Location: [facility];  Service: Endoscopy;  Laterality: N/A;    HYSTERECTOMY      KNEE ARTHROSCOPY W/ MENISCECTOMY Left 01/09/2023    Procedure: ARTHROSCOPY, KNEE, WITH MENISCECTOMY;  Surgeon: Tim Soto MD;  Location: [facility];  Service: Orthopedics;  Laterality: Left;  Linvatec video   fast fix    L WRIST SURGERY Left     THYROID SURGERY       FAMILY HISTORY:   Family History   Problem Relation Name Age of Onset    Diabetes Mother      Diabetes Father      Breast cancer Maternal Aunt       SOCIAL HISTORY:   Social History     Occupational History    Not on file   Tobacco Use    Smoking status: Never    Smokeless tobacco: Never   Substance and Sexual Activity    Alcohol  "use: Yes     Comment: rare    Drug use: Never    Sexual activity: Yes        MEDICATIONS:   Current Outpatient Medications:     ALPRAZolam (XANAX) 0.25 MG tablet, Take 1 tablet (0.25 mg total) by mouth nightly as needed for Anxiety., Disp: 30 tablet, Rfl: 3    amLODIPine (NORVASC) 10 MG tablet, Take 1 tablet (10 mg total) by mouth once daily., Disp: 30 tablet, Rfl: 3    amLODIPine (NORVASC) 10 MG tablet, Take 1 tablet (10 mg total) by mouth once daily., Disp: 30 tablet, Rfl: 11    azelastine (ASTELIN) 137 mcg (0.1 %) nasal spray, 1 spray (137 mcg total) by Nasal route 2 (two) times daily., Disp: 30 mL, Rfl: 0    BD VARUN 2ND GEN PEN NEEDLE 32 gauge x 5/32" Ndle, To use with insulin injections 5 times per day, Disp: 150 each, Rfl: 11    blood-glucose sensor (DEXCOM G6 SENSOR) Priscila, 1 Device by Misc.(Non-Drug; Combo Route) route every 10 days., Disp: 3 each, Rfl: 11    blood-glucose transmitter (DEXCOM G6 TRANSMITTER) Priscila, 1 Device by Misc.(Non-Drug; Combo Route) route every 3 (three) months., Disp: 1 each, Rfl: 4    cetirizine (ZYRTEC) 10 MG tablet, Take 1 tablet (10 mg total) by mouth nightly., Disp: 30 tablet, Rfl: 3    diclofenac sodium (SOLARAZE) 3 % gel, AAA TID PRN, Disp: 100 g, Rfl: 3    ergocalciferol (ERGOCALCIFEROL) 50,000 unit Cap, Take 1 capsule (50,000 Units total) by mouth every 7 days., Disp: 4 capsule, Rfl: 3    esomeprazole (NEXIUM) 40 MG capsule, Take 1 capsule (40 mg total) by mouth before breakfast., Disp: 30 capsule, Rfl: 3    ibuprofen (ADVIL,MOTRIN) 600 MG tablet, Take 1 tablet (600 mg total) by mouth 3 (three) times daily., Disp: 60 tablet, Rfl: 1    insulin aspart U-100 (NOVOLOG) 100 unit/mL injection, Inject up to 100 units daily in insulin pump, Disp: 30 mL, Rfl: 6    insulin aspart, niacinamide, (FIASP U-100 INSULIN) 100 unit/mL Soln, TO USE CONTINUOUSLY WITH INSULIN PUMP. MAX  UNITS PER DAY, Disp: 30 mL, Rfl: 6    insulin pump cart,automated,BT (OMNIPOD 5 G6 PODS, GEN 5,) Crtg, " Inject 1 Device into the skin Every 3 (three) days., Disp: 2 each, Rfl: 11    levothyroxine (SYNTHROID) 150 MCG tablet, Take 1 tablet (150 mcg total) by mouth every morning., Disp: 30 tablet, Rfl: 3    losartan (COZAAR) 50 MG tablet, Take 1 tablet (50 mg total) by mouth once daily., Disp: 30 tablet, Rfl: 3    montelukast (SINGULAIR) 10 mg tablet, Take 1 tablet (10 mg total) by mouth once daily., Disp: 30 tablet, Rfl: 3    montelukast (SINGULAIR) 10 mg tablet, Take 1 tablet (10 mg total) by mouth once daily., Disp: 30 tablet, Rfl: 3    pregabalin (LYRICA) 25 MG capsule, Take 1 capsule (25 mg total) by mouth every evening., Disp: 30 capsule, Rfl: 6    promethazine-dextromethorphan (PROMETHAZINE-DM) 6.25-15 mg/5 mL Syrp, Take 5 mLs by mouth every 6 (six) hours as needed., Disp: 180 mL, Rfl: 0    rosuvastatin (CRESTOR) 40 MG Tab, Take 1 tablet (40 mg total) by mouth every evening., Disp: 30 tablet, Rfl: 3    tiZANidine (ZANAFLEX) 4 MG tablet, Take 1 tablet (4 mg total) by mouth nightly as needed (muscle spasms)., Disp: 30 tablet, Rfl: 3    traMADoL (ULTRAM) 50 mg tablet, Take 50 mg by mouth every 4 to 6 hours as needed., Disp: , Rfl:     XHANCE 93 mcg/actuation AerB, USE 1 SPRAY IN EACH NOSTRIL TWICE DAILY AS DIRECTED, Disp: 16 mL, Rfl: 3  ALLERGIES:   Review of patient's allergies indicates:   Allergen Reactions    Hydrocodone Itching    Hydrocodone-acetaminophen      Itchy (skin)^         Physical Exam     There were no vitals filed for this visit.  Alert and oriented to person, place and time. No acute distress. Well-groomed, not ill appearing. Pupils round and reactive, normal respiratory effort, no audible wheezing.     On exam she is diffusely tender throughout the entire leg.  She has diffuse tenderness and hypersensitivity over the entire knee with limited range of motion secondary to pain.  She reports pain down the medial leg as well.  There is some reproducible pain with internal rotation of the hip.  No  significant lumbar tenderness.  Neurovascular intact.  No significant effusion of the left knee.  Knees feel stable to varus and valgus stress as well as with anterior posterior drawer.        Imaging:       X-Ray: I have reviewed all pertinent results/findings and my personal findings are:  Left knee x-rays show mild to moderate cartilage thinning over the lateral compartment with medial spurring over the proximal medial tibia.    Previous MRI shows patellofemoral arthrosis with partial PCL tear.  Prior evidence of chondroplasty    Assessment & Plan    Tear of PCL (posterior cruciate ligament) of knee, left, initial encounter  -     X-ray Knee Ortho Left with Flexion; Future; Expected date: 05/14/2024    Dorsalgia, unspecified  -     X-Ray Hip 2 or 3 views Left (with Pelvis when performed); Future; Expected date: 05/14/2024  -     MRI Lumbar Spine Without Contrast; Future; Expected date: 05/14/2024         Treatment options were discussed with her in detail.  Her symptoms are somewhat out of proportion to what we would expect to see based on her x-rays arthroscopic findings.  She is diffusely tender left knee with limited function secondary to pain.  Lot of her pain seems to be nerve related.  She has had a workup in the past for autoimmune disorder which was negative.  Denies any lumbar back pain but does state that she had some mild scoliosis as a child.  At this point she has failed conservative treatment with multiple injections and physical therapy formally in the outpatient setting.  I am not convinced her pain is specifically coming from the knee as she has fairly general pain throughout the entire left lower extremity.  Did discuss that this could be related to her lumbar spine or possibly radiate referred pain from her hip.  Would recommend MRI of the lumbar spine as well as x-rays left hip to rule out any other causes.  Also do a diagnostic injection of the left knee today to see if her symptoms improve  and at what degree

## 2024-05-20 ENCOUNTER — CLINICAL SUPPORT (OUTPATIENT)
Dept: REHABILITATION | Facility: HOSPITAL | Age: 49
End: 2024-05-20
Payer: MEDICAID

## 2024-05-20 DIAGNOSIS — R53.1 DECREASED STRENGTH: Primary | ICD-10-CM

## 2024-05-20 DIAGNOSIS — M62.89 MUSCLE TIGHTNESS: ICD-10-CM

## 2024-05-20 DIAGNOSIS — M25.60 DECREASED RANGE OF MOTION: ICD-10-CM

## 2024-05-20 PROCEDURE — 97110 THERAPEUTIC EXERCISES: CPT | Mod: PN

## 2024-05-20 NOTE — PROGRESS NOTES
"Count includes the [facility] / [facility]  Physical Therapy Daily Treatment Note     Name: Javeir Enciso  Clinic Number: [id number]    Therapy Diagnosis:   Encounter Diagnoses   Name Primary?    Decreased strength Yes    Muscle tightness     Decreased range of motion      Physician: Order, Paper    Visit Date: 5/20/2024    Physician: Order, Paper          Physician Orders: PT Eval and Treat  Medical Diagnosis from Referral: M54.16 (ICD-10-CM) - Lumbar radiculopathy M25.562 (ICD-10-CM) - Left knee pain  Evaluation Date: 4/24/2024  Authorization Period Expiration: 12/31/2024  Plan of Care Expiration: 8/30/2024                Progress Update: 5/24/2024             FOTO: 1 / 3   Visit # / Visits authorized: 6 / 12           Time In: 0955  (Pnt arrived early)  Time Out: 1035  Total Billable Time: 40 minutes    Precautions: Standard  Insurance: Payor: MEDICAID / Plan: [place] [employer] / Product Type: Managed Medicaid /     Subjective     Pt reports: that she just had a steroid injection which has given her some pain relief.  States that she gets an MRI tomorrow and will see the referring provider on Friday.    She was compliant with home exercise program.  Response to previous treatment: n/a  Functional change: n/a    Pain: 2/10  Location: left knee      Objective     Javier received therapeutic exercises to develop strength, endurance, ROM, flexibility, posture, and core stabilization for 40 minutes including:    Nustep x 10 minutes     Hamstring stretch 3 x 30 seconds bilateral - np     Quad sets with towel  3x10  Supine heel slides 2x10  SLRs  2x10 - unable to perform due to pain  Bridges 3x10  Hip adduction with ball  2x10   Clams green Theraband 3 x 10 reps    Double Knee To Chest with Physioball 2x10 reps       Gastroc stretch 3 x 30 seconds bilateral - np     Step ups 6"  3 x 10 reps bilateral - np  Hip 3 way 2 x 10 reps Bilateral - np    Home Exercises Provided and Patient Education Provided     Education " provided:   - low impact cardio options.    Written Home Exercises Provided: yes.  Exercises were reviewed and Javier was able to demonstrate them prior to the end of the session.  Javier demonstrated good  understanding of the education provided.     See EMR under Patient Instructions for exercises provided prior visit.    Assessment     Presents displays improved movement patterns due to decreased pain.   Will continue to progress with therapy.  Javier is progressing well towards her goals.   Pt prognosis is Good.     Pt will continue to benefit from skilled outpatient physical therapy to address the deficits listed in the problem list box on initial evaluation, provide pt/family education and to maximize pt's level of independence in the home and community environment.     Pt's spiritual, cultural and educational needs considered and pt agreeable to plan of care and goals.    Anticipated barriers to physical therapy: None    Goals:     SHORT TERM GOALS: 4 weeks 5/20/2024     Recent signs and systems trend is improving in order to progress towards LTG's. ongoing   Patient will be independent with HEP in order to further progress and return to maximal function. ongoing   Pain rating at Worst: 5/10 in order to progress towards increased independence with activity. ongoing   Patient will be able to correct postural deviations in sitting and standing, to decrease pain and promote postural awareness for injury prevention.  ongoing      LONG TERM GOALS: 8 weeks 5/20/2024     Patient will return to normal ADL, recreational, and work related activities with less pain and limitation.  ongoing   Patient will improve AROM to stated goals in order to return to maximal functional potential.  ongoing   Patient will improve Strength to stated goals of appropriate musculature in order to improve functional independence.  ongoing   Pain Rating at Best: 1/10 to improve Quality of Life.  ongoing   Patient will meet predicted  functional outcome (FOTO) score: 80% to increase self-worth & perceived functional ability. ongoing   Patient will have met/partially met personal goal of being able to stand without pain. ongoing       Plan     Continue POC as previously stated.    Main Vivas, PT

## 2024-05-21 ENCOUNTER — HOSPITAL ENCOUNTER (OUTPATIENT)
Dept: RADIOLOGY | Facility: HOSPITAL | Age: 49
Discharge: HOME | End: 2024-05-21
Attending: ORTHOPAEDIC SURGERY
Payer: MEDICAID

## 2024-05-21 ENCOUNTER — PATIENT MESSAGE (OUTPATIENT)
Dept: DIABETES | Facility: CLINIC | Age: 49
End: 2024-05-21
Payer: MEDICAID

## 2024-05-21 DIAGNOSIS — M54.9 DORSALGIA, UNSPECIFIED: ICD-10-CM

## 2024-05-21 PROCEDURE — 73502 X-RAY EXAM HIP UNI 2-3 VIEWS: CPT | Mod: TC,PO,LT

## 2024-05-21 PROCEDURE — 73502 X-RAY EXAM HIP UNI 2-3 VIEWS: CPT | Mod: 26,LT,, | Performed by: RADIOLOGY

## 2024-05-21 PROCEDURE — 72148 MRI LUMBAR SPINE W/O DYE: CPT | Mod: 26,,, | Performed by: RADIOLOGY

## 2024-05-21 PROCEDURE — 72148 MRI LUMBAR SPINE W/O DYE: CPT | Mod: TC,PO

## 2024-05-22 ENCOUNTER — CLINICAL SUPPORT (OUTPATIENT)
Dept: REHABILITATION | Facility: HOSPITAL | Age: 49
End: 2024-05-22
Payer: MEDICAID

## 2024-05-22 DIAGNOSIS — R53.1 DECREASED STRENGTH: Primary | ICD-10-CM

## 2024-05-22 DIAGNOSIS — M62.89 MUSCLE TIGHTNESS: ICD-10-CM

## 2024-05-22 DIAGNOSIS — M25.60 DECREASED RANGE OF MOTION: ICD-10-CM

## 2024-05-22 PROCEDURE — 97110 THERAPEUTIC EXERCISES: CPT | Mod: PN

## 2024-05-22 NOTE — PROGRESS NOTES
"[facility] / [facility]  Physical Therapy Daily Treatment Note     Name: Javier Enciso  Clinic Number: [id number]    Therapy Diagnosis:   Encounter Diagnoses   Name Primary?    Decreased strength Yes    Muscle tightness     Decreased range of motion      Physician: Order, Paper    Visit Date: 5/22/2024    Physician: Order, Paper          Physician Orders: PT Eval and Treat  Medical Diagnosis from Referral: M54.16 (ICD-10-CM) - Lumbar radiculopathy M25.562 (ICD-10-CM) - Left knee pain  Evaluation Date: 4/24/2024  Authorization Period Expiration: 12/31/2024  Plan of Care Expiration: 8/30/2024                Progress Update: 5/24/2024             FOTO: 1 / 3   Visit # / Visits authorized: 6 / 12           Time In: 0935  (Pnt arrived early)  Time Out: 1015  Total Billable Time: 40 minutes    Precautions: Standard  Insurance: Payor: MEDICAID / [place]: [place] [employer] / Product Type: Managed Medicaid /     Subjective     Pt reports: that she feels good today, no complaints.    She was compliant with home exercise program.  Response to previous treatment: n/a  Functional change: n/a    Pain: 2/10  Location: left knee      Objective     Javier received therapeutic exercises to develop strength, endurance, ROM, flexibility, posture, and core stabilization for 40 minutes including:    Nustep x 10 minutes     Hamstring stretch 3 x 30 seconds bilateral - np     Quad sets with towel  3x10  Supine heel slides 2x10  SLRs  2x10 - unable to perform due to pain  Bridges 3x10  Hip adduction with ball  2x10   Clams green Theraband 3 x 10 reps    Double Knee To Chest with Physioball 2x10 reps       Gastroc stretch 3 x 30 seconds bilateral - np     Step ups 6"  3 x 10 reps bilateral - np  Hip 3 way 2 x 10 reps Bilateral - np    Home Exercises Provided and Patient Education Provided     Education provided:   - low impact cardio options.    Written Home Exercises Provided: yes.  Exercises were reviewed and Javier " was able to demonstrate them prior to the end of the session.  Javier demonstrated good  understanding of the education provided.     See EMR under Patient Instructions for exercises provided prior visit.    Assessment     Presents displays improved movement patterns due to decreased pain.   Will continue to progress with therapy.  Javier is progressing well towards her goals.   Pt prognosis is Good.     Pt will continue to benefit from skilled outpatient physical therapy to address the deficits listed in the problem list box on initial evaluation, provide pt/family education and to maximize pt's level of independence in the home and community environment.     Pt's spiritual, cultural and educational needs considered and pt agreeable to plan of care and goals.    Anticipated barriers to physical therapy: None    Goals:     SHORT TERM GOALS: 4 weeks 5/22/2024     Recent signs and systems trend is improving in order to progress towards LTG's. ongoing   Patient will be independent with HEP in order to further progress and return to maximal function. ongoing   Pain rating at Worst: 5/10 in order to progress towards increased independence with activity. ongoing   Patient will be able to correct postural deviations in sitting and standing, to decrease pain and promote postural awareness for injury prevention.  ongoing      LONG TERM GOALS: 8 weeks 5/22/2024     Patient will return to normal ADL, recreational, and work related activities with less pain and limitation.  ongoing   Patient will improve AROM to stated goals in order to return to maximal functional potential.  ongoing   Patient will improve Strength to stated goals of appropriate musculature in order to improve functional independence.  ongoing   Pain Rating at Best: 1/10 to improve Quality of Life.  ongoing   Patient will meet predicted functional outcome (FOTO) score: 80% to increase self-worth & perceived functional ability. ongoing   Patient will have  met/partially met personal goal of being able to stand without pain. ongoing       Plan     Continue POC as previously stated.    Main Vivas, PT

## 2024-05-24 ENCOUNTER — OFFICE VISIT (OUTPATIENT)
Dept: ORTHOPEDICS | Facility: CLINIC | Age: 49
End: 2024-05-24
Payer: MEDICAID

## 2024-05-24 VITALS — BODY MASS INDEX: 29.8 KG/M2 | WEIGHT: 220 LBS | HEIGHT: 72 IN

## 2024-05-24 DIAGNOSIS — M25.562 CHRONIC PAIN OF LEFT KNEE: ICD-10-CM

## 2024-05-24 DIAGNOSIS — M54.16 LUMBAR RADICULOPATHY, CHRONIC: Primary | ICD-10-CM

## 2024-05-24 DIAGNOSIS — G89.29 CHRONIC PAIN OF LEFT KNEE: ICD-10-CM

## 2024-05-24 PROCEDURE — 1159F MED LIST DOCD IN RCRD: CPT | Mod: CPTII,,, | Performed by: ORTHOPAEDIC SURGERY

## 2024-05-24 PROCEDURE — 99213 OFFICE O/P EST LOW 20 MIN: CPT | Mod: PBBFAC,PO | Performed by: ORTHOPAEDIC SURGERY

## 2024-05-24 PROCEDURE — 3044F HG A1C LEVEL LT 7.0%: CPT | Mod: CPTII,,, | Performed by: ORTHOPAEDIC SURGERY

## 2024-05-24 PROCEDURE — 3008F BODY MASS INDEX DOCD: CPT | Mod: CPTII,,, | Performed by: ORTHOPAEDIC SURGERY

## 2024-05-24 PROCEDURE — 3066F NEPHROPATHY DOC TX: CPT | Mod: CPTII,,, | Performed by: ORTHOPAEDIC SURGERY

## 2024-05-24 PROCEDURE — 3061F NEG MICROALBUMINURIA REV: CPT | Mod: CPTII,,, | Performed by: ORTHOPAEDIC SURGERY

## 2024-05-24 PROCEDURE — 99214 OFFICE O/P EST MOD 30 MIN: CPT | Mod: S$PBB,,, | Performed by: ORTHOPAEDIC SURGERY

## 2024-05-24 PROCEDURE — 99999 PR PBB SHADOW E&M-EST. PATIENT-LVL III: CPT | Mod: PBBFAC,,, | Performed by: ORTHOPAEDIC SURGERY

## 2024-05-24 PROCEDURE — 4010F ACE/ARB THERAPY RXD/TAKEN: CPT | Mod: CPTII,,, | Performed by: ORTHOPAEDIC SURGERY

## 2024-05-24 NOTE — PROGRESS NOTES
Patient ID: Javier Enciso is a 49 y.o. female    Chief Complaint:   Chief Complaint   Patient presents with    Left Knee - Follow-up     Left knee injection on 05-14-24 offered relief.       History of Present Illness:    Pleasant 49-year-old female with diabetes who is status post knee arthroscopy by me with chondroplasty about a year and a half ago.  Postoperatively has continued to suffer with knee and leg pain.  Has done physical therapy as well as to steroid injections of the.  She does not remember if this gave her any relief.  Most recent injection was about 6 months ago.  She reports pain that radiates from the knee down to the medial leg as well as the case in the thigh.  She has done physical therapy as well as been seen by another orthopedic office.  Physical therapy is not helping and she states that is making her pain worse.    ____________________________________________________________________    Interval history 05/24/2024 : Patient returns today for MRI follow-up of their lumbar spine.  We also did a diagnostic injection of her left knee last visit.  She reports the injection to help.  Her pain is now a 2/10.  No significant pain or radicular symptoms today.    PAST MEDICAL HISTORY:   Past Medical History:   Diagnosis Date    Allergies     Diabetes mellitus, type 2     High cholesterol     Hypertension     Thyroid disease      PAST SURGICAL HISTORY:   Past Surgical History:   Procedure Laterality Date    BREAST BIOPSY      BREAST CYST ASPIRATION Left 1996    BREAST CYST ASPIRATION Right 2001    ESOPHAGOGASTRODUODENOSCOPY N/A 10/26/2022    Procedure: EGD (ESOPHAGOGASTRODUODENOSCOPY);  Surgeon: Dale Bray MD;  Location: [facility];  Service: Endoscopy;  Laterality: N/A;    HYSTERECTOMY      KNEE ARTHROSCOPY W/ MENISCECTOMY Left 01/09/2023    Procedure: ARTHROSCOPY, KNEE, WITH MENISCECTOMY;  Surgeon: Tim Soto MD;  Location: [facility];  Service: Orthopedics;  [facility]ality: [facility]  [facility]   fast fix    L WRIST SURGERY Left     THYROID SURGERY       FAMILY HISTORY:   Family History   Problem Relation Name Age of Onset    Diabetes Mother      Diabetes Father      Breast cancer Maternal Aunt       SOCIAL HISTORY:   Social History     Occupational History    Not on file   Tobacco Use    Smoking status: Former     Types: Cigarettes    Smokeless tobacco: Never   Substance and Sexual Activity    Alcohol use: Yes     Comment: rare    Drug use: Never    Sexual activity: Yes        MEDICATIONS:   Current Outpatient Medications:     ALPRAZolam (XANAX) 0.25 MG tablet, Take 1 tablet (0.25 mg total) by mouth nightly as needed for Anxiety., Disp: 30 tablet, Rfl: 3    amLODIPine (NORVASC) 10 MG tablet, Take 1 tablet (10 mg total) by mouth once daily., Disp: 30 tablet, Rfl: 3    amLODIPine (NORVASC) 10 MG tablet, Take 1 tablet (10 mg total) by mouth once daily., Disp: 30 tablet, Rfl: 11    azelastine (ASTELIN) 137 mcg (0.1 %) nasal spray, 1 spray (137 mcg total) by Nasal route 2 (two) times daily., Disp: 30 mL, Rfl: 0    BD VARUN 2ND GEN PEN NEEDLE 32 gauge x 5/32" Ndle, To use with insulin injections 5 times per day, Disp: 150 each, Rfl: 11    blood-glucose sensor (DEXCOM G6 SENSOR) Priscila, 1 Device by Misc.(Non-Drug; Combo Route) route every 10 days., Disp: 3 each, Rfl: 11    blood-glucose transmitter (DEXCOM G6 TRANSMITTER) Priscila, 1 Device by Misc.(Non-Drug; Combo Route) route every 3 (three) months., Disp: 1 each, Rfl: 4    cetirizine (ZYRTEC) 10 MG tablet, Take 1 tablet (10 mg total) by mouth nightly., Disp: 30 tablet, Rfl: 3    diclofenac sodium (SOLARAZE) 3 % gel, AAA TID PRN, Disp: 100 g, Rfl: 3    ergocalciferol (ERGOCALCIFEROL) 50,000 unit Cap, Take 1 capsule (50,000 Units total) by mouth every 7 days., Disp: 4 capsule, Rfl: 3    esomeprazole (NEXIUM) 40 MG capsule, Take 1 capsule (40 mg total) by mouth before breakfast., Disp: 30 capsule, Rfl: 3    ibuprofen (ADVIL,MOTRIN) 600 MG tablet, Take 1 tablet " (600 mg total) by mouth 3 (three) times daily., Disp: 60 tablet, Rfl: 1    insulin aspart U-100 (NOVOLOG) 100 unit/mL injection, Inject up to 100 units daily in insulin pump, Disp: 30 mL, Rfl: 6    insulin aspart, niacinamide, (FIASP U-100 INSULIN) 100 unit/mL Soln, TO USE CONTINUOUSLY WITH INSULIN PUMP. MAX  UNITS PER DAY, Disp: 30 mL, Rfl: 6    insulin pump cart,automated,BT (OMNIPOD 5 G6 PODS, GEN 5,) Crtg, Inject 1 Device into the skin Every 3 (three) days., Disp: 2 each, Rfl: 11    levothyroxine (SYNTHROID) 150 MCG tablet, Take 1 tablet (150 mcg total) by mouth every morning., Disp: 30 tablet, Rfl: 3    losartan (COZAAR) 50 MG tablet, Take 1 tablet (50 mg total) by mouth once daily., Disp: 30 tablet, Rfl: 3    montelukast (SINGULAIR) 10 mg tablet, Take 1 tablet (10 mg total) by mouth once daily., Disp: 30 tablet, Rfl: 3    montelukast (SINGULAIR) 10 mg tablet, Take 1 tablet (10 mg total) by mouth once daily., Disp: 30 tablet, Rfl: 3    pregabalin (LYRICA) 25 MG capsule, Take 1 capsule (25 mg total) by mouth every evening., Disp: 30 capsule, Rfl: 6    promethazine-dextromethorphan (PROMETHAZINE-DM) 6.25-15 mg/5 mL Syrp, Take 5 mLs by mouth every 6 (six) hours as needed., Disp: 180 mL, Rfl: 0    rosuvastatin (CRESTOR) 40 MG Tab, Take 1 tablet (40 mg total) by mouth every evening., Disp: 30 tablet, Rfl: 3    tiZANidine (ZANAFLEX) 4 MG tablet, Take 1 tablet (4 mg total) by mouth nightly as needed (muscle spasms)., Disp: 30 tablet, Rfl: 3    traMADoL (ULTRAM) 50 mg tablet, Take 50 mg by mouth every 4 to 6 hours as needed., Disp: , Rfl:     XHANCE 93 mcg/actuation AerB, USE 1 SPRAY IN EACH NOSTRIL TWICE DAILY AS DIRECTED, Disp: 16 mL, Rfl: 3  ALLERGIES:   Review of patient's allergies indicates:   Allergen Reactions    Hydrocodone Itching    Hydrocodone-acetaminophen      Itchy (skin)^         Physical Exam     There were no vitals filed for this visit.  Alert and oriented to person, place and time. No acute  distress. Well-groomed, not ill appearing. Pupils round and reactive, normal respiratory effort, no audible wheezing.     On exam she has range of motion 5° of extension to 100° of flexion.  Stable to varus and valgus stress.  Mild generalized global tenderness of the knee.  No pain with range of motion of the hip.  Popliteal angle 20°.  Sensation intact distally.      Imaging:       X-Ray: I have reviewed all pertinent results/findings and my personal findings are:  Left knee x-rays show mild to moderate cartilage thinning over the lateral compartment with medial spurring over the proximal medial tibia.    Previous MRI shows patellofemoral arthrosis with partial PCL tear.  Prior evidence of chondroplasty    MRI of the lumbar spine reviewed showing degenerative changes of the lumbar spine with moderate neuroforaminal stenosis left L4 nerve root    Assessment & Plan    Lumbar radiculopathy, chronic    Chronic pain of left knee           Treatment options were discussed with her in detail.  I went over her MRI findings.  I do see L4 neuroforaminal stenosis on the left side.  This is consistent with her previous complaints.  She did have significant relief with the steroid injection however did increase her blood sugars.  Her late this A1c has been elevated.  We discussed tight glycemic control.  We discussed home exercise program and p.r.n. injections.  She may be a better candidate for Zilretta or PRP however unlikely to be covered by insurance.  Continued conservative care for now.

## 2024-05-27 ENCOUNTER — CLINICAL SUPPORT (OUTPATIENT)
Dept: REHABILITATION | Facility: HOSPITAL | Age: 49
End: 2024-05-27
Payer: MEDICAID

## 2024-05-27 DIAGNOSIS — M25.60 DECREASED RANGE OF MOTION: ICD-10-CM

## 2024-05-27 DIAGNOSIS — R53.1 DECREASED STRENGTH: Primary | ICD-10-CM

## 2024-05-27 DIAGNOSIS — M62.89 MUSCLE TIGHTNESS: ICD-10-CM

## 2024-05-27 PROCEDURE — 97110 THERAPEUTIC EXERCISES: CPT | Mod: PN,CQ

## 2024-05-27 NOTE — PROGRESS NOTES
"Former[facility] / [facility]  Physical Therapy Daily Treatment Note     Name: Javier Enciso  Clinic Number: [id number]    Therapy Diagnosis:   Encounter Diagnoses   Name Primary?    Decreased strength Yes    Muscle tightness     Decreased range of motion      Physician: Order, Paper    Visit Date: 5/27/2024    Physician: Order, Paper          Physician Orders: PT Eval and Treat  Medical Diagnosis from Referral: M54.16 (ICD-10-CM) - Lumbar radiculopathy M25.562 (ICD-10-CM) - Left knee pain  Evaluation Date: 4/24/2024  Authorization Period Expiration: 12/31/2024  Plan of Care Expiration: 8/30/2024                Progress Update: 5/24/2024             FOTO: 1 / 3   Visit # / Visits authorized: 7 / 12           Time In: 0935  (Pnt arrived early)  Time Out: 1015  Total Billable Time: 40 minutes    Precautions: Standard  Insurance: Payor: MEDICAID / Plan: [email or web address] CONNECT / Product Type: Managed Medicaid /     Subjective     Pt reports: the shot is starting to wear off, she is starting to feel pain again.   She was compliant with home exercise program.  Response to previous treatment: n/a  Functional change: n/a    Pain: 5/10  Location: left knee      Objective     Javier received therapeutic exercises to develop strength, endurance, ROM, flexibility, posture, and core stabilization for 40 minutes including:    Nustep x 8-10 minutes     Hamstring stretch 3 x 30 seconds bilateral - np     Quad sets with towel  3x10  Supine heel slides 2x10  SLRs  2x10 - unable to perform due to pain  Bridges 3x10  Hip adduction with ball  2x10   Clams green Theraband 3 x 10 reps    Double Knee To Chest with Physioball 2x10 reps       Gastroc stretch 3 x 30 seconds bilateral - np     Step ups 6"  3 x 10 reps bilateral - np  Hip 3 way 2 x 10 reps Bilateral - np    Home Exercises Provided and Patient Education Provided     Education provided:   - low impact cardio options.    Written Home Exercises Provided: " yes.  Exercises were reviewed and Javier was able to demonstrate them prior to the end of the session.  Javier demonstrated good  understanding of the education provided.     See EMR under Patient Instructions for exercises provided prior visit.    Assessment     Presents displays antalgic gait upon arrival and reports her pain is returning to prior to her injection.  Tolerated exercises fair with slight modifications due to pain.  Will continue to progress with therapy.    Javier is progressing well towards her goals.   Pt prognosis is Good.     Pt will continue to benefit from skilled outpatient physical therapy to address the deficits listed in the problem list box on initial evaluation, provide pt/family education and to maximize pt's level of independence in the home and community environment.     Pt's spiritual, cultural and educational needs considered and pt agreeable to plan of care and goals.    Anticipated barriers to physical therapy: None    Goals:     SHORT TERM GOALS: 4 weeks 5/27/2024     Recent signs and systems trend is improving in order to progress towards LTG's. ongoing   Patient will be independent with HEP in order to further progress and return to maximal function. ongoing   Pain rating at Worst: 5/10 in order to progress towards increased independence with activity. ongoing   Patient will be able to correct postural deviations in sitting and standing, to decrease pain and promote postural awareness for injury prevention.  ongoing      LONG TERM GOALS: 8 weeks 5/27/2024     Patient will return to normal ADL, recreational, and work related activities with less pain and limitation.  ongoing   Patient will improve AROM to stated goals in order to return to maximal functional potential.  ongoing   Patient will improve Strength to stated goals of appropriate musculature in order to improve functional independence.  ongoing   Pain Rating at Best: 1/10 to improve Quality of Life.  ongoing   Patient  will meet predicted functional outcome (FOTO) score: 80% to increase self-worth & perceived functional ability. ongoing   Patient will have met/partially met personal goal of being able to stand without pain. ongoing       Plan     Continue POC as previously stated.    Meagan Bowden, PTA

## 2024-06-03 ENCOUNTER — CLINICAL SUPPORT (OUTPATIENT)
Dept: REHABILITATION | Facility: HOSPITAL | Age: 49
End: 2024-06-03
Payer: MEDICAID

## 2024-06-03 DIAGNOSIS — R53.1 DECREASED STRENGTH: Primary | ICD-10-CM

## 2024-06-03 DIAGNOSIS — M62.89 MUSCLE TIGHTNESS: ICD-10-CM

## 2024-06-03 DIAGNOSIS — M25.60 DECREASED RANGE OF MOTION: ICD-10-CM

## 2024-06-03 PROBLEM — R26.9 GAIT ABNORMALITY: Status: RESOLVED | Noted: 2024-04-24 | Resolved: 2024-06-03

## 2024-06-03 PROBLEM — R23.2 VASOMOTOR FLUSHING: Status: RESOLVED | Noted: 2023-09-11 | Resolved: 2024-06-03

## 2024-06-03 PROCEDURE — 97110 THERAPEUTIC EXERCISES: CPT | Mod: PN

## 2024-06-03 NOTE — PROGRESS NOTES
"[facility] / [facility]  Physical Therapy Daily Treatment Note     Name: Javier Enciso  Clinic Number: [id number]    Therapy Diagnosis:   Encounter Diagnoses   Name Primary?    Decreased strength Yes    Muscle tightness     Decreased range of motion      Physician: Order, Paper    Visit Date: 6/3/2024    Physician: Order, Paper          Physician Orders: PT Eval and Treat  Medical Diagnosis from Referral: M54.16 (ICD-10-CM) - Lumbar radiculopathy M25.562 (ICD-10-CM) - Left knee pain  Evaluation Date: 4/24/2024  Authorization Period Expiration: 12/31/2024  Plan of Care Expiration: 8/30/2024                Progress Update: 5/24/2024             FOTO: 1 / 3   Visit # / Visits authorized: 9 / 12           Time In: 0947  (Pnt arrived early)  Time Out: 1040  Total Billable Time: 53 minutes    Precautions: Standard  Insurance: Payor: MEDICAID / Plan: [place] [employer] [place] / Product Type: Managed Medicaid /     Subjective     Pt reports: the pain has returned and the steroid shot has worn off.  She was compliant with home exercise program.  Response to previous treatment: n/a  Functional change: n/a    Pain: 5/10  Location: left knee      Objective     Javier received therapeutic exercises to develop strength, endurance, ROM, flexibility, posture, and core stabilization for 53 minutes including:    Nustep x 8-10 minutes     Hamstring stretch 3 x 30 seconds bilateral - np     Quad sets with towel  3x10  Supine heel slides 2x10  SLRs  2x10 - unable to perform due to pain  Bridges 3x10  Hip adduction with ball  2x10   Clams green Theraband 3 x 10 reps    Double Knee To Chest with Physioball 2x10 reps       Gastroc stretch 3 x 30 seconds bilateral - np     Step ups 6"  3 x 10 reps bilateral - np  Hip 3 way 2 x 10 reps Bilateral - np    Home Exercises Provided and Patient Education Provided     Education provided:   - low impact cardio options.    Written Home Exercises Provided: yes.  Exercises were reviewed " and Javier was able to demonstrate them prior to the end of the session.  Javier demonstrated good  understanding of the education provided.     See EMR under Patient Instructions for exercises provided prior visit.    Assessment     Tolerated exercises fair with slight modifications due to pain.  Will continue to progress with therapy.    Javier is progressing well towards her goals.   Pt prognosis is Good.     Pt will continue to benefit from skilled outpatient physical therapy to address the deficits listed in the problem list box on initial evaluation, provide pt/family education and to maximize pt's level of independence in the home and community environment.     Pt's spiritual, cultural and educational needs considered and pt agreeable to plan of care and goals.    Anticipated barriers to physical therapy: None    Goals:     SHORT TERM GOALS: 4 weeks 6/3/2024     Recent signs and systems trend is improving in order to progress towards LTG's. ongoing   Patient will be independent with HEP in order to further progress and return to maximal function. ongoing   Pain rating at Worst: 5/10 in order to progress towards increased independence with activity. ongoing   Patient will be able to correct postural deviations in sitting and standing, to decrease pain and promote postural awareness for injury prevention.  ongoing      LONG TERM GOALS: 8 weeks 6/3/2024     Patient will return to normal ADL, recreational, and work related activities with less pain and limitation.  ongoing   Patient will improve AROM to stated goals in order to return to maximal functional potential.  ongoing   Patient will improve Strength to stated goals of appropriate musculature in order to improve functional independence.  ongoing   Pain Rating at Best: 1/10 to improve Quality of Life.  ongoing   Patient will meet predicted functional outcome (FOTO) score: 80% to increase self-worth & perceived functional ability. ongoing   Patient will have  met/partially met personal goal of being able to stand without pain. ongoing       Plan     Continue POC as previously stated.    Main Vivas, PT

## 2024-06-05 ENCOUNTER — CLINICAL SUPPORT (OUTPATIENT)
Dept: REHABILITATION | Facility: HOSPITAL | Age: 49
End: 2024-06-05
Payer: MEDICAID

## 2024-06-05 DIAGNOSIS — M25.60 DECREASED RANGE OF MOTION: Primary | ICD-10-CM

## 2024-06-05 PROCEDURE — 97110 THERAPEUTIC EXERCISES: CPT | Mod: PN

## 2024-06-05 NOTE — PROGRESS NOTES
"[facility] / [facility]  Physical Therapy Daily Treatment and Discharge Note     Name: Javier Enciso  Clinic Number: [id number]    Therapy Diagnosis:   Encounter Diagnosis   Name Primary?    Decreased range of motion Yes     Physician: Order, Paper    Visit Date: 6/5/2024    Physician: Order, Paper          Physician Orders: PT Eval and Treat  Medical Diagnosis from Referral: M54.16 (ICD-10-CM) - Lumbar radiculopathy M25.562 (ICD-10-CM) - Left knee pain  Evaluation Date: 4/24/2024  Authorization Period Expiration: 12/31/2024  Plan of Care Expiration: 8/30/2024                Progress Update: 5/24/2024             FOTO: 1 / 3   Visit # / Visits authorized: 10 / 12           Time In: 0920  (Pnt arrived early)  Time Out: 1013  Total Billable Time: 53 minutes    Precautions: Standard  Insurance: Payor: MEDICAID / [place]: [place] [employer] [place] / Product Type: Managed Medicaid /     Subjective     Pt reports: the pain is about the same as when she started therapy.  She was compliant with home exercise program.  Response to previous treatment: n/a  Functional change: n/a    Pain: 6/10  Location: left knee      Objective     Javier received therapeutic exercises to develop strength, endurance, ROM, flexibility, posture, and core stabilization for 53 minutes including:    Nustep x 10 minutes     Hamstring stretch 3 x 30 seconds bilateral - np     Quad sets with towel  3x10  Supine heel slides 2x10  SLRs  2x10   Bridges 3x10  Hip adduction with ball  2x10   Clams green Theraband 3 x 10 reps    Double Knee To Chest with Physioball 2x10 reps      Gastroc stretch 3 x 30 seconds bilateral - np     Step ups 6"  3 x 10 reps bilateral - np  Hip 3 way 2 x 10 reps Bilateral - np    Home Exercises Provided and Patient Education Provided     Education provided:   - low impact cardio options.    Written Home Exercises Provided: yes.  Exercises were reviewed and Javier was able to demonstrate them prior to the end of the " session.  Javier demonstrated good  understanding of the education provided.     See EMR under Patient Instructions for exercises provided prior visit.    Assessment     Tolerated exercises fair with slight modifications due to pain.  Will continue to progress with therapy.    Javier is progressing well towards her goals.   Pt prognosis is Good.     Pt is being discharged from physical therapy and will continue exercises as HEP.    Pt's spiritual, cultural and educational needs considered and pt agreeable to plan of care and goals.    Anticipated barriers to physical therapy: None    Goals:     SHORT TERM GOALS: 4 weeks 6/5/2024     Recent signs and systems trend is improving in order to progress towards LTG's. Not met   Patient will be independent with HEP in order to further progress and return to maximal function. Notm et   Pain rating at Worst: 5/10 in order to progress towards increased independence with activity. Not met   Patient will be able to correct postural deviations in sitting and standing, to decrease pain and promote postural awareness for injury prevention.  Not met      LONG TERM GOALS: 8 weeks 6/5/2024     Patient will return to normal ADL, recreational, and work related activities with less pain and limitation.  Not met   Patient will improve AROM to stated goals in order to return to maximal functional potential.  Not met   Patient will improve Strength to stated goals of appropriate musculature in order to improve functional independence.  Not met   Pain Rating at Best: 1/10 to improve Quality of Life.  Not met   Patient will meet predicted functional outcome (FOTO) score: 80% to increase self-worth & perceived functional ability. Not met   Patient will have met/partially met personal goal of being able to stand without pain. Not met       Plan     Pnt is being discharged from physical therapy.      Main Vivas, PT

## 2024-06-11 ENCOUNTER — TELEPHONE (OUTPATIENT)
Dept: OPTOMETRY | Facility: CLINIC | Age: 49
End: 2024-06-11
Payer: MEDICAID

## 2024-06-11 NOTE — TELEPHONE ENCOUNTER
----- Message from Jody Lemons sent at 6/11/2024  1:19 PM CDT -----  Pt calling kim see if she can have a regular eye exam as well as her diabetic exam on 6-20       Confirmed patient's contact info below:  Contact Name: Javier Enciso  Phone Number: [phone]

## 2024-06-20 ENCOUNTER — OFFICE VISIT (OUTPATIENT)
Dept: OPTOMETRY | Facility: CLINIC | Age: 49
End: 2024-06-20
Payer: MEDICAID

## 2024-06-20 DIAGNOSIS — H52.7 REFRACTIVE ERROR: ICD-10-CM

## 2024-06-20 DIAGNOSIS — H53.002 AMBLYOPIA OF LEFT EYE: ICD-10-CM

## 2024-06-20 DIAGNOSIS — E11.9 DIABETES MELLITUS TYPE 2 WITHOUT RETINOPATHY: Primary | ICD-10-CM

## 2024-06-20 PROCEDURE — 3066F NEPHROPATHY DOC TX: CPT | Mod: CPTII,,, | Performed by: OPTOMETRIST

## 2024-06-20 PROCEDURE — 2023F DILAT RTA XM W/O RTNOPTHY: CPT | Mod: CPTII,,, | Performed by: OPTOMETRIST

## 2024-06-20 PROCEDURE — 92004 COMPRE OPH EXAM NEW PT 1/>: CPT | Mod: S$PBB,,, | Performed by: OPTOMETRIST

## 2024-06-20 PROCEDURE — 3061F NEG MICROALBUMINURIA REV: CPT | Mod: CPTII,,, | Performed by: OPTOMETRIST

## 2024-06-20 PROCEDURE — 1159F MED LIST DOCD IN RCRD: CPT | Mod: CPTII,,, | Performed by: OPTOMETRIST

## 2024-06-20 PROCEDURE — 99213 OFFICE O/P EST LOW 20 MIN: CPT | Mod: PBBFAC,PO | Performed by: OPTOMETRIST

## 2024-06-20 PROCEDURE — 3044F HG A1C LEVEL LT 7.0%: CPT | Mod: CPTII,,, | Performed by: OPTOMETRIST

## 2024-06-20 PROCEDURE — 4010F ACE/ARB THERAPY RXD/TAKEN: CPT | Mod: CPTII,,, | Performed by: OPTOMETRIST

## 2024-06-20 PROCEDURE — 99999 PR PBB SHADOW E&M-EST. PATIENT-LVL III: CPT | Mod: PBBFAC,,, | Performed by: OPTOMETRIST

## 2024-06-20 PROCEDURE — 1160F RVW MEDS BY RX/DR IN RCRD: CPT | Mod: CPTII,,, | Performed by: OPTOMETRIST

## 2024-06-20 NOTE — PROGRESS NOTES
HPI     Diabetic Eye Exam     Additional comments: LDE: [facility] 2023           Comments    50 YO female presents today for an annual diabetic eye exam.     Patient states is noticing fluctuation in vision due to BS.   Notes that she also has had a lazy eye OS since childhood.   Notes she does have tearing and mucus daily.     Hemoglobin A1C       Date                     Value               Ref Range             Status                03/01/2024               6.9 (H)             4.0 - 5.6 %           Final                  09/11/2023               7.3 (H)             4.0 - 5.6 %           Final                 06/10/2023               7.3 (H)             4.0 - 5.6 %           Final              KELLY ~ 1 year at [employer]    Hx of patching OD until age 14   Wore glasses but did not help             Last edited by Greg Matias, OD on 6/20/2024  8:33 AM.            Assessment /Plan     For exam results, see Encounter Report.    Diabetes mellitus type 2 without retinopathy    Amblyopia of left eye    Refractive error      1. Diabetes mellitus type 2 without retinopathy  Discussed possible ocular affects of uncontrolled blood sugar with patient.   Recommended continued strong blood sugar control and continued care with PCP.   Monitor yearly.     2. Amblyopia of left eye  Longstanding, stable since childhood  Hx of patching OD  Discussed monocular precautions  Monitor     3. Refractive error  Declines refraction, prefers to go to in-network vision provider

## 2024-06-25 ENCOUNTER — PATIENT MESSAGE (OUTPATIENT)
Dept: OPTOMETRY | Facility: CLINIC | Age: 49
End: 2024-06-25
Payer: MEDICAID

## 2024-07-02 ENCOUNTER — LAB VISIT (OUTPATIENT)
Dept: LAB | Facility: HOSPITAL | Age: 49
End: 2024-07-02
Attending: NURSE PRACTITIONER
Payer: MEDICAID

## 2024-07-02 DIAGNOSIS — E13.9 LADA (LATENT AUTOIMMUNE DIABETES MELLITUS IN ADULTS): ICD-10-CM

## 2024-07-02 LAB
ALBUMIN SERPL BCP-MCNC: 3.9 G/DL (ref 3.5–5.2)
ALBUMIN/CREAT UR: 5.3 UG/MG (ref 0–30)
ALP SERPL-CCNC: 81 U/L (ref 55–135)
ALT SERPL W/O P-5'-P-CCNC: 31 U/L (ref 10–44)
ANION GAP SERPL CALC-SCNC: 10 MMOL/L (ref 8–16)
AST SERPL-CCNC: 17 U/L (ref 10–40)
BASOPHILS # BLD AUTO: 0.01 K/UL (ref 0–0.2)
BASOPHILS NFR BLD: 0.2 % (ref 0–1.9)
BILIRUB SERPL-MCNC: 0.7 MG/DL (ref 0.1–1)
BUN SERPL-MCNC: 17 MG/DL (ref 6–20)
CALCIUM SERPL-MCNC: 9.7 MG/DL (ref 8.7–10.5)
CHLORIDE SERPL-SCNC: 104 MMOL/L (ref 95–110)
CHOLEST SERPL-MCNC: 146 MG/DL (ref 120–199)
CHOLEST/HDLC SERPL: 3.5 {RATIO} (ref 2–5)
CO2 SERPL-SCNC: 24 MMOL/L (ref 23–29)
CREAT SERPL-MCNC: 1.2 MG/DL (ref 0.5–1.4)
CREAT UR-MCNC: 282 MG/DL (ref 15–325)
DIFFERENTIAL METHOD BLD: ABNORMAL
EOSINOPHIL # BLD AUTO: 0.1 K/UL (ref 0–0.5)
EOSINOPHIL NFR BLD: 3.1 % (ref 0–8)
ERYTHROCYTE [DISTWIDTH] IN BLOOD BY AUTOMATED COUNT: 13 % (ref 11.5–14.5)
EST. GFR  (NO RACE VARIABLE): 56.2 ML/MIN/1.73 M^2
ESTIMATED AVG GLUCOSE: 154 MG/DL (ref 68–131)
GLUCOSE SERPL-MCNC: 196 MG/DL (ref 70–110)
HBA1C MFR BLD: 7 % (ref 4–5.6)
HCT VFR BLD AUTO: 42.7 % (ref 37–48.5)
HDLC SERPL-MCNC: 42 MG/DL (ref 40–75)
HDLC SERPL: 28.8 % (ref 20–50)
HGB BLD-MCNC: 13.1 G/DL (ref 12–16)
IMM GRANULOCYTES # BLD AUTO: 0.02 K/UL (ref 0–0.04)
IMM GRANULOCYTES NFR BLD AUTO: 0.5 % (ref 0–0.5)
LDLC SERPL CALC-MCNC: 84.8 MG/DL (ref 63–159)
LYMPHOCYTES # BLD AUTO: 1.8 K/UL (ref 1–4.8)
LYMPHOCYTES NFR BLD: 42.6 % (ref 18–48)
MCH RBC QN AUTO: 26.7 PG (ref 27–31)
MCHC RBC AUTO-ENTMCNC: 30.7 G/DL (ref 32–36)
MCV RBC AUTO: 87 FL (ref 82–98)
MICROALBUMIN UR DL<=1MG/L-MCNC: 15 UG/ML
MONOCYTES # BLD AUTO: 0.3 K/UL (ref 0.3–1)
MONOCYTES NFR BLD: 8.2 % (ref 4–15)
NEUTROPHILS # BLD AUTO: 1.9 K/UL (ref 1.8–7.7)
NEUTROPHILS NFR BLD: 45.4 % (ref 38–73)
NONHDLC SERPL-MCNC: 104 MG/DL
NRBC BLD-RTO: 0 /100 WBC
PLATELET # BLD AUTO: 238 K/UL (ref 150–450)
PMV BLD AUTO: 11.2 FL (ref 9.2–12.9)
POTASSIUM SERPL-SCNC: 4.1 MMOL/L (ref 3.5–5.1)
PROT SERPL-MCNC: 7.2 G/DL (ref 6–8.4)
RBC # BLD AUTO: 4.91 M/UL (ref 4–5.4)
SODIUM SERPL-SCNC: 138 MMOL/L (ref 136–145)
TRIGL SERPL-MCNC: 96 MG/DL (ref 30–150)
TSH SERPL DL<=0.005 MIU/L-ACNC: 2.2 UIU/ML (ref 0.4–4)
WBC # BLD AUTO: 4.13 K/UL (ref 3.9–12.7)

## 2024-07-02 PROCEDURE — 82043 UR ALBUMIN QUANTITATIVE: CPT | Performed by: NURSE PRACTITIONER

## 2024-07-02 PROCEDURE — 84443 ASSAY THYROID STIM HORMONE: CPT | Performed by: NURSE PRACTITIONER

## 2024-07-02 PROCEDURE — 80061 LIPID PANEL: CPT | Performed by: NURSE PRACTITIONER

## 2024-07-02 PROCEDURE — 80053 COMPREHEN METABOLIC PANEL: CPT | Performed by: NURSE PRACTITIONER

## 2024-07-02 PROCEDURE — 85025 COMPLETE CBC W/AUTO DIFF WBC: CPT | Performed by: NURSE PRACTITIONER

## 2024-07-02 PROCEDURE — 83036 HEMOGLOBIN GLYCOSYLATED A1C: CPT | Performed by: NURSE PRACTITIONER

## 2024-07-02 PROCEDURE — 36415 COLL VENOUS BLD VENIPUNCTURE: CPT | Mod: PO | Performed by: NURSE PRACTITIONER

## 2024-07-03 ENCOUNTER — DOCUMENTATION ONLY (OUTPATIENT)
Dept: SLEEP MEDICINE | Facility: HOSPITAL | Age: 49
End: 2024-07-03
Payer: MEDICAID

## 2024-07-05 DIAGNOSIS — E13.9 LADA (LATENT AUTOIMMUNE DIABETES MELLITUS IN ADULTS): ICD-10-CM

## 2024-07-05 RX ORDER — INSULIN PMP CART,AUT,G6/7,CNTR
EACH SUBCUTANEOUS
Qty: 2 EACH | Refills: 11 | Status: SHIPPED | OUTPATIENT
Start: 2024-07-05

## 2024-07-08 ENCOUNTER — TELEPHONE (OUTPATIENT)
Dept: DIABETES | Facility: CLINIC | Age: 49
End: 2024-07-08
Payer: MEDICAID

## 2024-07-09 ENCOUNTER — OFFICE VISIT (OUTPATIENT)
Dept: DIABETES | Facility: CLINIC | Age: 49
End: 2024-07-09
Payer: MEDICAID

## 2024-07-09 VITALS
SYSTOLIC BLOOD PRESSURE: 118 MMHG | HEART RATE: 72 BPM | DIASTOLIC BLOOD PRESSURE: 80 MMHG | BODY MASS INDEX: 29.71 KG/M2 | OXYGEN SATURATION: 100 % | WEIGHT: 219.31 LBS | HEIGHT: 72 IN

## 2024-07-09 DIAGNOSIS — E89.0 POSTOPERATIVE HYPOTHYROIDISM: ICD-10-CM

## 2024-07-09 DIAGNOSIS — E11.42 TYPE 2 DIABETES MELLITUS WITH DIABETIC POLYNEUROPATHY, WITH LONG-TERM CURRENT USE OF INSULIN: ICD-10-CM

## 2024-07-09 DIAGNOSIS — E78.00 PURE HYPERCHOLESTEROLEMIA: Chronic | ICD-10-CM

## 2024-07-09 DIAGNOSIS — E89.0 HISTORY OF THYROIDECTOMY, TOTAL: ICD-10-CM

## 2024-07-09 DIAGNOSIS — Z96.41 INSULIN PUMP IN PLACE: ICD-10-CM

## 2024-07-09 DIAGNOSIS — E13.9 LADA (LATENT AUTOIMMUNE DIABETES MELLITUS IN ADULTS): Primary | ICD-10-CM

## 2024-07-09 DIAGNOSIS — I10 PRIMARY HYPERTENSION: Chronic | ICD-10-CM

## 2024-07-09 DIAGNOSIS — Z79.4 TYPE 2 DIABETES MELLITUS WITH DIABETIC POLYNEUROPATHY, WITH LONG-TERM CURRENT USE OF INSULIN: ICD-10-CM

## 2024-07-09 DIAGNOSIS — E66.3 OVERWEIGHT (BMI 25.0-29.9): ICD-10-CM

## 2024-07-09 DIAGNOSIS — Z71.9 HEALTH EDUCATION/COUNSELING: ICD-10-CM

## 2024-07-09 PROCEDURE — 99214 OFFICE O/P EST MOD 30 MIN: CPT | Mod: S$PBB,,, | Performed by: NURSE PRACTITIONER

## 2024-07-09 PROCEDURE — 3008F BODY MASS INDEX DOCD: CPT | Mod: CPTII,,, | Performed by: NURSE PRACTITIONER

## 2024-07-09 PROCEDURE — 4010F ACE/ARB THERAPY RXD/TAKEN: CPT | Mod: CPTII,,, | Performed by: NURSE PRACTITIONER

## 2024-07-09 PROCEDURE — 3079F DIAST BP 80-89 MM HG: CPT | Mod: CPTII,,, | Performed by: NURSE PRACTITIONER

## 2024-07-09 PROCEDURE — 3074F SYST BP LT 130 MM HG: CPT | Mod: CPTII,,, | Performed by: NURSE PRACTITIONER

## 2024-07-09 PROCEDURE — 1160F RVW MEDS BY RX/DR IN RCRD: CPT | Mod: CPTII,,, | Performed by: NURSE PRACTITIONER

## 2024-07-09 PROCEDURE — 99999 PR PBB SHADOW E&M-EST. PATIENT-LVL V: CPT | Mod: PBBFAC,,, | Performed by: NURSE PRACTITIONER

## 2024-07-09 PROCEDURE — 3066F NEPHROPATHY DOC TX: CPT | Mod: CPTII,,, | Performed by: NURSE PRACTITIONER

## 2024-07-09 PROCEDURE — 3061F NEG MICROALBUMINURIA REV: CPT | Mod: CPTII,,, | Performed by: NURSE PRACTITIONER

## 2024-07-09 PROCEDURE — 3051F HG A1C>EQUAL 7.0%<8.0%: CPT | Mod: CPTII,,, | Performed by: NURSE PRACTITIONER

## 2024-07-09 PROCEDURE — 95251 CONT GLUC MNTR ANALYSIS I&R: CPT | Mod: ,,, | Performed by: NURSE PRACTITIONER

## 2024-07-09 PROCEDURE — 1159F MED LIST DOCD IN RCRD: CPT | Mod: CPTII,,, | Performed by: NURSE PRACTITIONER

## 2024-07-09 PROCEDURE — 99215 OFFICE O/P EST HI 40 MIN: CPT | Mod: PBBFAC,PN | Performed by: NURSE PRACTITIONER

## 2024-07-09 RX ORDER — INSULIN ASPART 100 [IU]/ML
INJECTION, SOLUTION INTRAVENOUS; SUBCUTANEOUS
Qty: 15 ML | Refills: 6 | Status: SHIPPED | OUTPATIENT
Start: 2024-07-09

## 2024-07-09 RX ORDER — BLOOD-GLUCOSE SENSOR
1 EACH MISCELLANEOUS
Qty: 3 EACH | Refills: 11 | Status: SHIPPED | OUTPATIENT
Start: 2024-07-09 | End: 2025-07-09

## 2024-07-09 RX ORDER — INSULIN ASPART INJECTION 100 [IU]/ML
INJECTION, SOLUTION SUBCUTANEOUS
Qty: 30 ML | Refills: 6 | Status: SHIPPED | OUTPATIENT
Start: 2024-07-09

## 2024-07-09 RX ORDER — PEN NEEDLE, DIABETIC 32GX 5/32"
NEEDLE, DISPOSABLE MISCELLANEOUS
Qty: 150 EACH | Refills: 11 | Status: SHIPPED | OUTPATIENT
Start: 2024-07-09

## 2024-07-09 RX ORDER — BLOOD-GLUCOSE TRANSMITTER
1 EACH MISCELLANEOUS
Qty: 1 EACH | Refills: 4 | Status: SHIPPED | OUTPATIENT
Start: 2024-07-09 | End: 2025-07-09

## 2024-07-09 RX ORDER — INSULIN PMP CART,AUT,G6/7,CNTR
1 EACH SUBCUTANEOUS
Qty: 10 EACH | Refills: 11 | Status: SHIPPED | OUTPATIENT
Start: 2024-07-09

## 2024-07-09 RX ORDER — INSULIN GLARGINE 100 [IU]/ML
16 INJECTION, SOLUTION SUBCUTANEOUS NIGHTLY
Qty: 9 ML | Refills: 6 | Status: SHIPPED | OUTPATIENT
Start: 2024-07-09 | End: 2025-07-09

## 2024-07-09 NOTE — PROGRESS NOTES
CC:   Chief Complaint   Patient presents with    Diabetes Mellitus       HPI: Javier Enciso is a 49 y.o. female presents for a follow up visit today for the management of T2DM.     She was diagnosed with prediabetes in 2018 and was on Metformin, developed T2DM around 2020. She went to the ER in December 2021 and she was started on insulin therapy.     Family hx of diabetes: mother and father   Hospitalized for diabetes: denies   Insulin therapy: December 2021 4/2022- TOM + 0.04 - slightly positive   4/2022- c-peptide level elevated 5.53  10/2022- repeat c-peptide level  elevated 5.72    No personal or FH of thyroid cancer or personal of pancreatic cancer or pancreatitis.     Hx of thyroidectomy due to cysts- January 2012-- not cancerous. LT4 150 mcg daily   Last TSH WNL --she reports that she is taking her levothyroxine      Our last visit was in March 2024  At that visit we continued the Omnipod   Changed her insulin to Fiasp instead of Novolog   Cut mathew on her pump settings to prevent hypoglycemia   She did not like the mounjaro and did not want to revisit   Continued the dexcom   A1c is at 7%   See attached downloads   She had a steroid injection in her knee at the end of May/ early June- her sugars were in the 200's for 2 weeks following the injection     Still suffering with left knee pain   Trying see a different orthopedic surgeon- Teja to get a revision of her knee surgery that Brittany did                             DIABETES COMPLICATIONS: peripheral neuropathy      Diabetes Management Status    ASA: stopped ASA     Statin: Taking--Crestor 40 mg  ACE/ARB: Taking--losartan 50 mg      The 10-year ASCVD risk score (Kizzy HARRISON, et al., 2019) is: 5.2%    Values used to calculate the score:      Age: 49 years      Sex: Female      Is Non- : Yes      Diabetic: Yes      Tobacco smoker: No      Systolic Blood Pressure: 118 mmHg      Is BP treated: Yes      HDL Cholesterol: 42 mg/dL       Total Cholesterol: 146 mg/dL      Screening or Prevention Patient's value Goal Complete/Controlled?   HgA1C Testing and Control   Lab Results   Component Value Date    HGBA1C 7.0 (H) 07/02/2024      Annually/Less than 8% No   Lipid profile : 07/02/2024 Annually Yes   LDL control Lab Results   Component Value Date    LDLCALC 84.8 07/02/2024    Annually/Less than 100 mg/dl  No   Nephropathy screening Lab Results   Component Value Date    LABMICR 15.0 07/02/2024     Lab Results   Component Value Date    PROTEINUA Negative 10/05/2022    Annually No   Blood pressure BP Readings from Last 1 Encounters:   07/09/24 118/80    Less than 140/90 Yes   Dilated retinal exam : 06/20/2024 Annually No   Foot exam   : 02/21/2022 Annually No       CURRENT A1C:    Hemoglobin A1C   Date Value Ref Range Status   07/02/2024 7.0 (H) 4.0 - 5.6 % Final     Comment:     ADA Screening Guidelines:  5.7-6.4%  Consistent with prediabetes  >or=6.5%  Consistent with diabetes    High levels of fetal hemoglobin interfere with the HbA1C  assay. Heterozygous hemoglobin variants (HbS, HgC, etc)do  not significantly interfere with this assay.   However, presence of multiple variants may affect accuracy.     03/01/2024 6.9 (H) 4.0 - 5.6 % Final     Comment:     ADA Screening Guidelines:  5.7-6.4%  Consistent with prediabetes  >or=6.5%  Consistent with diabetes    High levels of fetal hemoglobin interfere with the HbA1C  assay. Heterozygous hemoglobin variants (HbS, HgC, etc)do  not significantly interfere with this assay.   However, presence of multiple variants may affect accuracy.     09/11/2023 7.3 (H) 4.0 - 5.6 % Final     Comment:     ADA Screening Guidelines:  5.7-6.4%  Consistent with prediabetes  >or=6.5%  Consistent with diabetes    High levels of fetal hemoglobin interfere with the HbA1C  assay. Heterozygous hemoglobin variants (HbS, HgC, etc)do  not significantly interfere with this assay.   However, presence of multiple variants may affect  accuracy.         GOAL A1C: 6.5% without hypoglycemia       DM MEDICATIONS USED IN THE PAST: Lantus, Novolog   Metformin -- wasn't working   dexcom   Ozempic -- nausea   Jardiance yeast infection   Omnipod 5   Ariane Mon         CURRENT DIABETES MEDICATIONS   Insulin Pump:   Continue Omnipod 5 with dexcom with  Fiasp      Insulin pump settings: continue current doses   Basal: 0.65 units/hr -- cut back based on download   ICR: 1:10-- set doses -- 6 units with meals   ISF:  MN-9AM: 1:50-- cut back to prevent reported lows overnight   9AM-MN 1:45  Target: 120  IOB: 3 hours --    Pump site change: q 3 days   Cartridge change: q 3 days  Insulin TDD: 22.3 units    Basal 48%   Bolus 52 %   Using the bolus wizard: 1.9 bolues per day   Overridin%    100% in auto mode     Back up Lantus/Levemir: back up Lantus     Patient's pump was downloaded in clinic today and reviewed with patient.   Please see attached documents for pump download.         BLOOD GLUCOSE MONITORING:   Sensor type: Dexcom G6   Average BG readin  Time in range: 85%    Estimated A1c is 6.8 %   14% high and <1% very high   0% low and 0% very low   Site change:  q10 days    Two weeks prior the average was 151  in target range 81 % of the time with an estimated A1c of 6.9%      Supplies from pharmacy now-- sending to [facility] to get them regularly         Sensor was downloaded in clinic today and reviewed with patient.   Please see attached document for download.       HYPOGLYCEMIA:   0% low 0% very low   0% low - 2 weeks prior   Glucagon kit: Baqsimi    Medic alert bracelet: denies         MEALS: eating 3 meals per day   BF: cup of coffee, oatmeal with waffle or wheat bread with egg- or [employer]   Lunch: job pays for lunch--- fast food- or meat with veggie - or Burger and No fries   Dinner: veggie with meat -- rarely does rice -- spinach, German cut green beans or broccoli   Snack: rarely   Drinks: --SF beverages   she stopped the  juices and sugary beverages   Water makes her own tea with sugar substitute          CURRENT EXERCISE:  PT completed   None       Review of Systems  Review of Systems   Constitutional:  Negative for appetite change, fatigue and unexpected weight change.   HENT:  Negative for trouble swallowing.    Eyes:  Negative for visual disturbance.   Respiratory:  Negative for shortness of breath.    Cardiovascular:  Negative for chest pain.   Gastrointestinal:  Negative for nausea.   Endocrine: Negative for polydipsia, polyphagia and polyuria.   Genitourinary:         No Nocturia    Musculoskeletal:  Positive for arthralgias (left knee). Negative for back pain.   Skin:  Negative for wound.   Neurological:  Positive for numbness.       Physical Exam   Physical Exam  Vitals and nursing note reviewed.   Constitutional:       Appearance: She is well-developed. She is obese.   HENT:      Head: Normocephalic and atraumatic.      Right Ear: External ear normal.      Left Ear: External ear normal.      Nose: Nose normal.   Neck:      Thyroid: No thyromegaly.      Trachea: No tracheal deviation.   Cardiovascular:      Rate and Rhythm: Normal rate and regular rhythm.      Heart sounds: No murmur heard.  Pulmonary:      Effort: Pulmonary effort is normal. No respiratory distress.      Breath sounds: Normal breath sounds.   Abdominal:      Palpations: Abdomen is soft.      Tenderness: There is no abdominal tenderness.      Hernia: No hernia is present.   Musculoskeletal:      Cervical back: Normal range of motion and neck supple.   Skin:     General: Skin is warm and dry.      Capillary Refill: Capillary refill takes less than 2 seconds.      Findings: No rash.      Comments: POD sites and dexcom sites are normal appearing. No lipo hypertropthy or atrophy     Neurological:      Mental Status: She is alert and oriented to person, place, and time.      Cranial Nerves: No cranial nerve deficit.   Psychiatric:         Behavior: Behavior  "normal.         Judgment: Judgment normal.         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   She did not like mounjaro and does not want to revisit   She likes the Fiasp         -- Medication Changes:   Continue Omnipod 5 with dexcom with Fiasp insulin     Insulin pump settings: continue current pump settings   Basal: 0.65 units/hr --   ICR: 1:10-- set doses -- 6 units with meals   ISF:  MN-9AM: 1:50-  9AM-MN 1:45  Target: 120  IOB: 3 hours     -- Reviewed goals of therapy are to get the best control we can without hypoglycemia.  -- Reviewed patient's current insulin regimen. Clarified proper insulin dose and timing in relation to meals, etc. Insulin injection sites and proper rotation instructed.    -- Advised frequent self blood glucose monitoring.  Patient encouraged to document glucose results and bring them to every clinic visit.  Continue to use the dexcom personal CGM-- dexcom G6    -- Hypoglycemia precautions discussed. Instructed on precautions before driving.    -- Call for Bg repeatedly < 70 or > 180.   -- Close adherence to lifestyle changes recommended.   -- Periodic follow ups for eye evaluations, foot care and dental care suggested.         Patient has diabetes mellitus and manages diabetes " with intensive insulin regimen and uses prandial and basal insulin daily-- omnipod 5   Patient requires a therapeutic CGM and is willing to use therapeutic CGM for the necessary frequent adjustments of insulin therapy.  I have completed an in-person visit during the previous 6 months and will continue to have in-person visits every 6 months to assess adherence to their CGM regimen and diabetes treatment plan.  Due to COVID pandemic and need for remote monitoring this tool is medically necessary            Type 2 diabetes mellitus with diabetic polyneuropathy, with long-term current use of insulin  Optimize BG readings.   See above.   Following with podiatry     Educated patient to check feet daily for any foreign objects and/or wounds. Discussed with patient the importance of wearing appropriate footwear at all times, not to walk barefoot ever, and to check shoes before putting them on feet. Instructed patient to keep feet dry by regularly changing shoes and socks and drying feet after baths and exercises. Also, instructed patient to report any new lesions, discolorations, or swelling to a healthcare professional.        Primary hypertension  BP goal is < 140/90.   Tolerating ARB  Controlled   Blood pressure goals discussed with patient      Pure hypercholesterolemia  On statin per ADA recommendations  LDL goal < 100. LDL at goal. LFTs WNL. Continue statin.         Overweight (BMI 25.0-29.9)  Body mass index is 28.93 kg/m².  Increases insulin resistance.   Discussed DM diet and exercise.       History of thyroidectomy, total  On LT4   Reports cysts that were removed were  Benign  Evaluated by General Endocrine in August of 2022-- Dr. NAHUN Santos     Postoperative hypothyroidism  TSH WNL, on LT4 150 mcg, reinforced take by itself on empty stomach, first thing in the morning and wait at least 30-60 minutes to eat, drink, or take other medications.      Seen by General Endocrine in August of 2022-- Dr. Kumar       Insulin  pump in place  See above         I spent a total of 30  minutes on the day of the visit.This includes face to face time and non-face to face time preparing to see the patient (eg, review of tests), obtaining and/or reviewing separately obtained history, documenting clinical information in the electronic or other health record, independently interpreting results and communicating results to the patient/family/caregiver, or care coordinator.          Pump backup plan    If the insulin pump is non functional and discontinued for anticipated more than 20 hours, please give daily injections of:   Long acting insulin Lantus 16 units daily   Short acting insulin Fiasp 6 units  for meals according to carb ratios and sensitivity factor in the pump.     When the insulin pump is restarted, do not restart basal rates until at least 22 hours after the last long acting insulin injection. You can set a 0% temporary basal setting that will last until this time and use your pump to bolus for meals and correction.     For any technical insulin pump issues, please contact the insulin pump company; the toll free number is printed on the label on the back of the insulin pump.       If your sugar is running high for a few hours and does not respond to two correction doses from the insulin pump, it may mean that you have a bad pump site and the site should be changed         Follow up in about 4 months (around 11/9/2024).  Follow up with me in 4 months with labs prior         Orders Placed This Encounter   Procedures    Hemoglobin A1C     Standing Status:   Future     Standing Expiration Date:   1/9/2026    Basic Metabolic Panel     Standing Status:   Future     Standing Expiration Date:   1/9/2026       Recommendations were discussed with the patient in detail  The patient verbalized understanding and agrees with the plan outlined as above.     This note was partly generated with [employer] voice recognition software. I apologize for any  possible typographical errors.

## 2024-07-09 NOTE — PATIENT INSTRUCTIONS
Pump backup plan    If the insulin pump is non functional and discontinued for anticipated more than 20 hours, please give daily injections of:   Long acting insulin Lantus 16 units daily   Short acting insulin Fiasp 6 units  for meals according to carb ratios and sensitivity factor in the pump.     When the insulin pump is restarted, do not restart basal rates until at least 22 hours after the last long acting insulin injection. You can set a 0% temporary basal setting that will last until this time and use your pump to bolus for meals and correction.     For any technical insulin pump issues, please contact the insulin pump company; the toll free number is printed on the label on the back of the insulin pump.       If your sugar is running high for a few hours and does not respond to two correction doses from the insulin pump, it may mean that you have a bad pump site and the site should be changed

## 2024-07-09 NOTE — ASSESSMENT & PLAN NOTE
Body mass index is 28.93 kg/m².  Increases insulin resistance.   Discussed DM diet and exercise.

## 2024-07-09 NOTE — ASSESSMENT & PLAN NOTE
"A1c has increased to 7%   Readings are better on pump and dexcom download- see attached   She reports that she received 2 steroid injections in her knee in May/June that likely impacted her A1c- Bg was 200's when that happened -- discussed increasing bolus doses to 8-10 units when she gets steroids and to hit the "use sensor" button more often   + TOM   c-peptide level WNL X2 now   Can treat as T2DM   Dexcom is helping with self awareness and self accountability  Unable to tolerate Ozempic due to extreme nausea  Unable to tolerate Jardiance due to yeast infections.   She did not like mounjaro and does not want to revisit   She likes the Fiasp         -- Medication Changes:   Continue Omnipod 5 with dexcom with Fiasp insulin     Insulin pump settings: continue current pump settings   Basal: 0.65 units/hr --   ICR: 1:10-- set doses -- 6 units with meals   ISF:  MN-9AM: 1:50-  9AM-MN 1:45  Target: 120  IOB: 3 hours     -- Reviewed goals of therapy are to get the best control we can without hypoglycemia.  -- Reviewed patient's current insulin regimen. Clarified proper insulin dose and timing in relation to meals, etc. Insulin injection sites and proper rotation instructed.    -- Advised frequent self blood glucose monitoring.  Patient encouraged to document glucose results and bring them to every clinic visit.  Continue to use the dexcom personal CGM-- dexcom G6    -- Hypoglycemia precautions discussed. Instructed on precautions before driving.    -- Call for Bg repeatedly < 70 or > 180.   -- Close adherence to lifestyle changes recommended.   -- Periodic follow ups for eye evaluations, foot care and dental care suggested.         Patient has diabetes mellitus and manages diabetes with intensive insulin regimen and uses prandial and basal insulin daily-- omnipod 5   Patient requires a therapeutic CGM and is willing to use therapeutic CGM for the necessary frequent adjustments of insulin therapy.  I have completed an " in-person visit during the previous 6 months and will continue to have in-person visits every 6 months to assess adherence to their CGM regimen and diabetes treatment plan.  Due to COVID pandemic and need for remote monitoring this tool is medically necessary

## 2024-07-10 ENCOUNTER — TELEPHONE (OUTPATIENT)
Dept: OPTOMETRY | Facility: CLINIC | Age: 49
End: 2024-07-10
Payer: MEDICAID

## 2024-07-10 NOTE — TELEPHONE ENCOUNTER
----- Message from Oskar Dallas sent at 7/10/2024  9:22 AM CDT -----  Regarding: Rx  Type: Needs Medical Advice    Who Called:  Shahab with Dr Donnell Lopez    Best Call Back Number: [phone]    Additional Information: pt LOV 6/20/24. Dr Lopez is checking to see if new Rx for glasses was made at LOV. If made, please fax to [phone]. If not, please call.    Office faxing med release form now.

## 2024-07-31 ENCOUNTER — OFFICE VISIT (OUTPATIENT)
Dept: PODIATRY | Facility: CLINIC | Age: 49
End: 2024-07-31
Payer: MEDICAID

## 2024-07-31 VITALS
HEIGHT: 72 IN | SYSTOLIC BLOOD PRESSURE: 125 MMHG | WEIGHT: 216.63 LBS | HEART RATE: 77 BPM | DIASTOLIC BLOOD PRESSURE: 87 MMHG | BODY MASS INDEX: 29.34 KG/M2

## 2024-07-31 DIAGNOSIS — L60.0 ONYCHOMYCOSIS WITH INGROWN TOENAIL: ICD-10-CM

## 2024-07-31 DIAGNOSIS — Z79.4 TYPE 2 DIABETES MELLITUS WITH DIABETIC POLYNEUROPATHY, WITH LONG-TERM CURRENT USE OF INSULIN: Primary | ICD-10-CM

## 2024-07-31 DIAGNOSIS — L60.0 INGROWN NAIL: ICD-10-CM

## 2024-07-31 DIAGNOSIS — B35.1 ONYCHOMYCOSIS WITH INGROWN TOENAIL: ICD-10-CM

## 2024-07-31 DIAGNOSIS — G57.61 MORTON'S METATARSALGIA, NEURALGIA, OR NEUROMA, RIGHT: ICD-10-CM

## 2024-07-31 DIAGNOSIS — R52 TINGLING PAIN: ICD-10-CM

## 2024-07-31 DIAGNOSIS — G57.62 NEUROMA OF SECOND INTERSPACE OF LEFT FOOT: ICD-10-CM

## 2024-07-31 DIAGNOSIS — L03.031 PARONYCHIA OF FOURTH TOE OF RIGHT FOOT: ICD-10-CM

## 2024-07-31 DIAGNOSIS — M79.674 PAIN AROUND TOENAIL, RIGHT FOOT: ICD-10-CM

## 2024-07-31 DIAGNOSIS — R25.2 FOOT CRAMPS: ICD-10-CM

## 2024-07-31 DIAGNOSIS — E11.42 TYPE 2 DIABETES MELLITUS WITH DIABETIC POLYNEUROPATHY, WITH LONG-TERM CURRENT USE OF INSULIN: Primary | ICD-10-CM

## 2024-07-31 DIAGNOSIS — L98.0 PYOGENIC GRANULOMA OF SKIN: ICD-10-CM

## 2024-07-31 PROCEDURE — 11720 DEBRIDE NAIL 1-5: CPT | Mod: PBBFAC,PN | Performed by: PODIATRIST

## 2024-07-31 PROCEDURE — 17250 CHEM CAUT OF GRANLTJ TISSUE: CPT | Mod: PBBFAC,PN | Performed by: PODIATRIST

## 2024-07-31 PROCEDURE — 3008F BODY MASS INDEX DOCD: CPT | Mod: CPTII,,, | Performed by: PODIATRIST

## 2024-07-31 PROCEDURE — 3079F DIAST BP 80-89 MM HG: CPT | Mod: CPTII,,, | Performed by: PODIATRIST

## 2024-07-31 PROCEDURE — 17250 CHEM CAUT OF GRANLTJ TISSUE: CPT | Mod: S$PBB,,, | Performed by: PODIATRIST

## 2024-07-31 PROCEDURE — 3066F NEPHROPATHY DOC TX: CPT | Mod: CPTII,,, | Performed by: PODIATRIST

## 2024-07-31 PROCEDURE — 3061F NEG MICROALBUMINURIA REV: CPT | Mod: CPTII,,, | Performed by: PODIATRIST

## 2024-07-31 PROCEDURE — 4010F ACE/ARB THERAPY RXD/TAKEN: CPT | Mod: CPTII,,, | Performed by: PODIATRIST

## 2024-07-31 PROCEDURE — 3074F SYST BP LT 130 MM HG: CPT | Mod: CPTII,,, | Performed by: PODIATRIST

## 2024-07-31 PROCEDURE — 99204 OFFICE O/P NEW MOD 45 MIN: CPT | Mod: 25,S$PBB,, | Performed by: PODIATRIST

## 2024-07-31 PROCEDURE — 3051F HG A1C>EQUAL 7.0%<8.0%: CPT | Mod: CPTII,,, | Performed by: PODIATRIST

## 2024-07-31 PROCEDURE — 99214 OFFICE O/P EST MOD 30 MIN: CPT | Mod: PBBFAC,PN | Performed by: PODIATRIST

## 2024-07-31 PROCEDURE — 1159F MED LIST DOCD IN RCRD: CPT | Mod: CPTII,,, | Performed by: PODIATRIST

## 2024-07-31 PROCEDURE — 99999 PR PBB SHADOW E&M-EST. PATIENT-LVL IV: CPT | Mod: PBBFAC,,, | Performed by: PODIATRIST

## 2024-07-31 RX ORDER — IBUPROFEN 200 MG
800 TABLET ORAL
COMMUNITY

## 2024-07-31 NOTE — PROGRESS NOTES
Subjective:      Patient ID: Javier Enciso is a 49 y.o. female.    Chief Complaint: Diabetic Foot Exam, Nail Problem (discolored), and Toe Pain (Right 4th)    Javier is a 49 y.o. female who presents new to the clinic c/o  painful IGTN on the 4th toe R foot w/ underlying growth of skin x yrs; no h/o known injury. Also has thick & discolored nails B/L that tend to ingrow.  She was told she has diabetic neuropathy - tingling, cramping, stinging & crawling sensation on & off.  Also deals with a lot of pain L knee & associated leg pain; pain level 6/10 -sees ortho.  Normally in flat sandals including today but @ do not get home as well.    Last saw podiatrist about 3 years ago outside [facility].    Current shoe gear; flat sandal.  Has diabetic shoes.    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)    Anxiety    Pure hypercholesterolemia    Migraine without aura and without status migrainosus, not intractable    Gastroesophageal reflux disease without esophagitis    Vitamin D deficiency    Primary hypertension    Type 2 diabetes mellitus with diabetic polyneuropathy, with long-term current use of insulin    Overweight (BMI 25.0-29.9)    Postoperative hypothyroidism    History of thyroidectomy, total    Knee joint disorder (narrowing), left    Type 2 diabetes mellitus with hyperglycemia, with long-term current use of insulin    ROBERTH (obstructive sleep apnea)    Encounter for screening colonoscopy    Insulin pump in place    Insulin pump fitting or adjustment    Decreased range of motion      Hemoglobin A1C   Date Value Ref Range Status   07/02/2024 7.0 (H) 4.0 - 5.6 % Final     Comment:     ADA Screening Guidelines:  5.7-6.4%   Consistent with prediabetes  >or=6.5%  Consistent with diabetes    High levels of fetal hemoglobin interfere with the HbA1C  assay. Heterozygous hemoglobin variants (HbS, HgC, etc)do  not significantly interfere with this assay.   However, presence of multiple variants may affect accuracy.     03/01/2024 6.9 (H) 4.0 - 5.6 % Final     Comment:     ADA Screening Guidelines:  5.7-6.4%  Consistent with prediabetes  >or=6.5%  Consistent with diabetes    High levels of fetal hemoglobin interfere with the HbA1C  assay. Heterozygous hemoglobin variants (HbS, HgC, etc)do  not significantly interfere with this assay.   However, presence of multiple variants may affect accuracy.     09/11/2023 7.3 (H) 4.0 - 5.6 % Final     Comment:     ADA Screening Guidelines:  5.7-6.4%  Consistent with prediabetes  >or=6.5%  Consistent with diabetes    High levels of fetal hemoglobin interfere with the HbA1C  assay. Heterozygous hemoglobin variants (HbS, HgC, etc)do  not significantly interfere with this assay.   However, presence of multiple variants may affect accuracy.       Objective:      Review of Systems   Constitutional: Negative for malaise/fatigue.   Cardiovascular:  Negative for leg swelling.   Skin:  Positive for color change, dry skin and nail changes. Negative for itching, rash and suspicious lesions.   Musculoskeletal:  Positive for back pain, muscle cramps and myalgias. Negative for falls and muscle weakness.   Neurological:  Positive for paresthesias and sensory change. Negative for focal weakness, numbness and weakness.   Psychiatric/Behavioral:  The patient is nervous/anxious.      Physical Exam  Vitals reviewed.   Constitutional:       General: She is not in acute distress.     Appearance: She is well-developed and overweight.   Cardiovascular:      Pulses: Normal pulses.           Posterior tibial pulses are 2+ on the right side and 2+ on the left side.   Musculoskeletal:         General: Tenderness present. No swelling  or signs of injury.      Right lower leg: No edema.      Left lower leg: No edema.      Right foot: Deformity (normal/ high MLA B/L) and bunion present.      Left foot: Deformity (hammertoes B/L) present. Left foot bunion: mild HAV B/L.  Feet:      Right foot:      Skin integrity: Dry skin present.      Toenail Condition: Right toenails are long and ingrown. Fungal disease present.     Left foot:      Skin integrity: Dry skin present.      Toenail Condition: Left toenails are long and ingrown. Fungal disease present.     Comments: Toenails 1st, 2nd, 3rd, 4th, 5th  B/L are hypertrophic, wide & cryptotic. Also, B/L distal L>R hallux, 4th & lateral 5th R are also thickened, dystrophic, discolored, w/ crumbly subungual debris.  Tender to distal nail plate pressure B/L hallux & 4th toe R, w/out periungual skin abnormality noted; subungual granuloma 4th R.    Equinus B/L ankles w/ < 90 deg foot to leg noted w/ knees extended.      MS strength of extrinsics to foot & ankle B/L + 5/5 in DF/PF/Inv/Ev to resistance w/ no reproduction of pain in any direction.     Passive ROM of ankle & pedal joints is painless & w/out crepitation B/L.     Skin:     General: Skin is warm and dry.      Capillary Refill: Capillary refill takes less than 2 seconds.      Findings: Lesion (Subungual granuloma 4th R) present. No bruising, erythema or rash.   Neurological:      Mental Status: She is alert and oriented to person, place, and time.      Motor: No weakness.      Gait: Gait normal.      Comments: Paresthesias including tingling, stinging cramping & crawling sensation B/L feet w/ no clearly identified trigger or source; no hyperemia.    No TTP IMS B/L nor w/ lateral met.head compression. Fullness B/L 2nd & R 3rd; + palpable clicking/ + Stephany's sign 2nd L & 3rd R MS w/ lateral met.head compression.         Psychiatric:         Mood and Affect: Affect normal. Mood is anxious.         Behavior: Behavior normal. Behavior is cooperative.          Assessment:      Encounter Diagnoses   Name Primary?    Type 2 diabetes mellitus with diabetic polyneuropathy, with long-term current use of insulin Yes    Pyogenic granuloma of skin     Ingrown nail     Onychomycosis with ingrown toenail     Neuroma of second interspace of left foot     Valdez's metatarsalgia, neuralgia, or neuroma, right     Foot cramps     Tingling pain     Pain around toenail, right foot        Problem List Items Addressed This Visit          Endocrine    Type 2 diabetes mellitus with diabetic polyneuropathy, with long-term current use of insulin - Primary    Relevant Orders    Nail debridement     Other Visit Diagnoses       Pyogenic granuloma of skin        Relevant Orders    Nail debridement    Ingrown nail        Relevant Orders    Nail debridement    Onychomycosis with ingrown toenail        Relevant Orders    Nail debridement    Neuroma of second interspace of left foot        Valdez's metatarsalgia, neuralgia, or neuroma, right        Foot cramps        Tingling pain        Pain around toenail, right foot        Relevant Orders    Nail debridement           Plan:       Javier was seen today for diabetic foot exam, nail problem and toe pain.    Diagnoses and all orders for this visit:    Type 2 diabetes mellitus with diabetic polyneuropathy, with long-term current use of insulin  -     Nail debridement    Pyogenic granuloma of skin  -     Nail debridement    Ingrown nail  -     Nail debridement    Onychomycosis with ingrown toenail  -     Nail debridement    Neuroma of second interspace of left foot    Valdez's metatarsalgia, neuralgia, or neuroma, right    Foot cramps    Tingling pain    Pain around toenail, right foot  -     Nail debridement    I counseled the patient on her conditions, their implications & medical mgmt.    - Shoe inspection. Diabetic Foot Education. Patient reminded of the importance of good blood sugar control to help prevent podiatric complications of diabetes.  Patient instructed on proper foot hygeine. We discussed wearing proper shoe gear, daily foot inspections, never walking w/out protective shoe gear, annual DM foot exam, sooner prn.       Utilizing sterile toenail nippers, I aggressively debrided the offending nail borders B/L x 4 approximately 3 mm from its edge & carried the nail plate incision down @ an angle in order to wedge out the offending cryptotic portion of the nail platein toto.   Chemical cauterization of subungual granuloma w/ silver nitrate R 4th toe.  The area was cleansed w/ alcohol. Patient tolerated the procedure well & related significant relief.    Discussed temporary nail removal 4th toe R under LA, w/ PO antifungal tx until nail regrowth ( previous DPMhad also advised of option to perform matrixectomy, if recurrent & difficult to self manage), after resolution or before recurrence of ssx.     Patient instructed on appropriate self debridement of nails w/ correct instrumentation to prevent recurrent SSX.  Instructions provided on OTC topical antifungal Tx options.    Discussed general issues surrounding neuromas & foot cramps along w/ the advantages & disadvantages of various tx strategies.  Discussed shoe choice.  - Shoe inspection. Patient instructed on proper supportive shoe gear all times WB including @ home.  Discussed non-prescription inserts.  -Dispensed thin met.gelstraps for forefoot & thick Silopos gelstraps for arch B/L.  -To bring in shoes for offloading PPT met.pads & arch pads for MLA supports when ssx resolved.  Discussed antiinflammatories including topical & PO NSAIDs, PO or injectable steroid, if further alleviation of discomfort needed.    F/U 3-4 wks., sooner prn.        A total of 46 mins.was spent on chart review, patient visit & documentation.

## 2024-08-01 NOTE — PROCEDURES
Nail debridement    Date/Time: 7/31/2024 10:00 AM    Performed by: Shell Edwards DPM  Authorized by: Shell Edwards DPM    Consent Done?:  Yes (Verbal)    Nail Care Type:  Debride(Left 1st Toe, Right 1st Toe, Right 4th Toe and Right 5th Toe)  Patient tolerance:  Patient tolerated the procedure well with no immediate complications     Debridement & subtotal avulsion R 4th toenail performed, granuloma identified.  Silver nitrate applied to nail bed.  Area cleaned w/ alcohol.  Band-Aid applied.

## 2024-08-28 ENCOUNTER — PATIENT MESSAGE (OUTPATIENT)
Dept: DIABETES | Facility: CLINIC | Age: 49
End: 2024-08-28
Payer: MEDICAID

## 2024-09-04 ENCOUNTER — OFFICE VISIT (OUTPATIENT)
Dept: PODIATRY | Facility: CLINIC | Age: 49
End: 2024-09-04
Payer: MEDICAID

## 2024-09-04 VITALS
HEART RATE: 75 BPM | WEIGHT: 220 LBS | DIASTOLIC BLOOD PRESSURE: 76 MMHG | SYSTOLIC BLOOD PRESSURE: 120 MMHG | BODY MASS INDEX: 29.03 KG/M2

## 2024-09-04 DIAGNOSIS — Z79.4 TYPE 2 DIABETES MELLITUS WITH DIABETIC POLYNEUROPATHY, WITH LONG-TERM CURRENT USE OF INSULIN: ICD-10-CM

## 2024-09-04 DIAGNOSIS — L60.0 ONYCHOCRYPTOSIS: ICD-10-CM

## 2024-09-04 DIAGNOSIS — L60.0 ONYCHOMYCOSIS WITH INGROWN TOENAIL: ICD-10-CM

## 2024-09-04 DIAGNOSIS — B35.1 ONYCHOMYCOSIS WITH INGROWN TOENAIL: ICD-10-CM

## 2024-09-04 DIAGNOSIS — E11.42 TYPE 2 DIABETES MELLITUS WITH DIABETIC POLYNEUROPATHY, WITH LONG-TERM CURRENT USE OF INSULIN: ICD-10-CM

## 2024-09-04 DIAGNOSIS — L98.0 PYOGENIC GRANULOMA OF SKIN: ICD-10-CM

## 2024-09-04 DIAGNOSIS — M79.674 PAIN AROUND TOENAIL, RIGHT FOOT: Primary | ICD-10-CM

## 2024-09-04 DIAGNOSIS — L84 PLANTAR CALLUS: ICD-10-CM

## 2024-09-04 PROCEDURE — 17250 CHEM CAUT OF GRANLTJ TISSUE: CPT | Mod: PBBFAC,PN | Performed by: PODIATRIST

## 2024-09-04 PROCEDURE — 99999 PR PBB SHADOW E&M-EST. PATIENT-LVL IV: CPT | Mod: PBBFAC,,, | Performed by: PODIATRIST

## 2024-09-04 PROCEDURE — 11720 DEBRIDE NAIL 1-5: CPT | Mod: PBBFAC,PN | Performed by: PODIATRIST

## 2024-09-04 PROCEDURE — 99214 OFFICE O/P EST MOD 30 MIN: CPT | Mod: PBBFAC,PN,25 | Performed by: PODIATRIST

## 2024-09-04 NOTE — PROGRESS NOTES
Subjective:      Patient ID: Javier Enciso is a 49 y.o. female.    Chief Complaint: Follow-up (4th toe pain)    Patient c/o recurrent bleeding w/ trimming of R 4th toenail, as well as pain to direct pressure continuous when nail plate has grown back. Wants to discuss tx options; ultimately will schedule permanent nail plate under local, encouraged per stepmom who accompanies here.  Also has callus (L foot) & nails long - doesn't go regularly to pedicure.  7/31/24  Javier is a 49 y.o. female who presents new to the clinic c/o  painful IGTN on the 4th toe R foot w/ underlying growth of skin x yrs; no h/o known injury. Also has thick & discolored nails B/L that tend to ingrow.  She was told she has diabetic neuropathy - tingling, cramping, stinging & crawling sensation on & off.  Also deals with a lot of pain L knee & associated leg pain; pain level 6/10 -sees ortho.  Normally in flat sandals including today but @ do not get home as well.    Last saw podiatrist about 3 years ago outside [facility].    Current shoe gear; flat sandal.  Has diabetic shoes.    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)    Anxiety    Pure hypercholesterolemia    Migraine without aura and without status migrainosus, not intractable    Gastroesophageal reflux disease without esophagitis    Vitamin D deficiency    Primary hypertension    Type 2 diabetes mellitus with diabetic polyneuropathy, with long-term current use of insulin    Overweight (BMI 25.0-29.9)    Postoperative hypothyroidism    History of thyroidectomy, total    Knee joint disorder (narrowing), left    Type 2 diabetes mellitus with hyperglycemia, with long-term current use of insulin     ROBERTH (obstructive sleep apnea)    Encounter for screening colonoscopy    Insulin pump in place    Insulin pump fitting or adjustment    Decreased range of motion      Hemoglobin A1C   Date Value Ref Range Status   07/02/2024 7.0 (H) 4.0 - 5.6 % Final     Comment:     ADA Screening Guidelines:  5.7-6.4%  Consistent with prediabetes  >or=6.5%  Consistent with diabetes    High levels of fetal hemoglobin interfere with the HbA1C  assay. Heterozygous hemoglobin variants (HbS, HgC, etc)do  not significantly interfere with this assay.   However, presence of multiple variants may affect accuracy.     03/01/2024 6.9 (H) 4.0 - 5.6 % Final     Comment:     ADA Screening Guidelines:  5.7-6.4%  Consistent with prediabetes  >or=6.5%  Consistent with diabetes    High levels of fetal hemoglobin interfere with the HbA1C  assay. Heterozygous hemoglobin variants (HbS, HgC, etc)do  not significantly interfere with this assay.   However, presence of multiple variants may affect accuracy.     09/11/2023 7.3 (H) 4.0 - 5.6 % Final     Comment:     ADA Screening Guidelines:  5.7-6.4%  Consistent with prediabetes  >or=6.5%  Consistent with diabetes    High levels of fetal hemoglobin interfere with the HbA1C  assay. Heterozygous hemoglobin variants (HbS, HgC, etc)do  not significantly interfere with this assay.   However, presence of multiple variants may affect accuracy.       Objective:      Review of Systems   Constitutional: Negative for malaise/fatigue.   Cardiovascular:  Negative for leg swelling.   Skin:  Positive for color change, dry skin and nail changes. Negative for itching, rash and suspicious lesions.   Musculoskeletal:  Positive for back pain, muscle cramps and myalgias. Negative for falls and muscle weakness.   Neurological:  Positive for paresthesias and sensory change. Negative for focal weakness, numbness and weakness.   Psychiatric/Behavioral:  The patient is nervous/anxious.      Physical Exam  Vitals reviewed.    Constitutional:       General: She is not in acute distress.     Appearance: She is well-developed and overweight.   Cardiovascular:      Pulses: Normal pulses.           Posterior tibial pulses are 2+ on the right side and 2+ on the left side.   Musculoskeletal:         General: Tenderness present. No swelling or signs of injury.      Right lower leg: No edema.      Left lower leg: No edema.      Right foot: Deformity (normal/ high MLA B/L) and bunion present.      Left foot: Deformity (hammertoes B/L) present. Left foot bunion: mild HAV B/L.  Feet:      Right foot:      Skin integrity: Dry skin present.      Toenail Condition: Right toenails are long and ingrown. Fungal disease present.     Left foot:      Skin integrity: Dry skin present.      Toenail Condition: Left toenails are long and ingrown. Fungal disease present.     Comments: Toenails 1st, 2nd, 3rd, 4th, 5th  B/L are wide & cryptotic. Also, B/L distal L>R hallux, 4th & lateral 5th R are thickened, dystrophic, discolored, w/ crumbly subungual debris. Tender to distal nail plate pressure 4th toe R, w/ subungual granuloma.    Equinus B/L ankles w/ < 90 deg foot to leg noted w/ knees extended.      MS strength of extrinsics to foot & ankle B/L + 5/5 in DF/PF/Inv/Ev to resistance w/ no reproduction of pain in any direction.     Passive ROM of ankle & pedal joints is painless & w/out crepitation B/L.   Skin:     General: Skin is warm and dry.      Capillary Refill: Capillary refill takes less than 2 seconds.      Findings: Lesion (Subungual granuloma 4th R) present. No bruising, erythema or rash.      Comments: IPK medial HIPJ B/L as well as L sub 5th met.head.    Neurological:      Mental Status: She is alert and oriented to person, place, and time.      Motor: No weakness.      Gait: Gait normal.      Comments: Paresthesias including tingling, stinging cramping & crawling sensation B/L feet w/ no clearly identified trigger or source; no hyperemia.    No  TTP IMS B/L nor w/ lateral met.head compression. Fullness B/L 2nd & R 3rd; + palpable clicking/ + Stephany's sign 2nd L & 3rd R MS w/ lateral met.head compression.         Psychiatric:         Mood and Affect: Affect normal. Mood is anxious.         Behavior: Behavior normal. Behavior is cooperative.         Assessment:      Encounter Diagnoses   Name Primary?    Type 2 diabetes mellitus with diabetic polyneuropathy, with long-term current use of insulin     Pyogenic granuloma of skin     Onychocryptosis     Pain around toenail, right foot Yes    Plantar callus     Onychomycosis with ingrown toenail        Problem List Items Addressed This Visit          Endocrine    Type 2 diabetes mellitus with diabetic polyneuropathy, with long-term current use of insulin     Other Visit Diagnoses       Pain around toenail, right foot    -  Primary    Relevant Orders    Nail debridement R 4th toe    Pyogenic granuloma of skin        Onychocryptosis        Relevant Orders    Nail debridement R 4th toe    Plantar callus        Onychomycosis with ingrown toenail               Plan:       Javier was seen today for follow-up.    Diagnoses and all orders for this visit:    Pain around toenail, right foot  -     Nail debridement R 4th toe    Type 2 diabetes mellitus with diabetic polyneuropathy, with long-term current use of insulin    Pyogenic granuloma of skin    Onychocryptosis  -     Nail debridement R 4th toe    Plantar callus    Onychomycosis with ingrown toenail    I counseled the patient on her conditions, their implications & medical mgmt.    - Shoe inspection. Diabetic Foot Education. Patient reminded of the importance of good blood sugar control to help prevent podiatric complications of diabetes. Patient instructed on proper foot hygeine. We discussed wearing proper shoe gear, daily foot inspections, never walking w/out protective shoe gear, annual DM foot exam, sooner prn.       Utilizing sterile toenail nippers, I  aggressively debrided the offending nail borders R 4th toe approximately 3 mm from its edge & carried the nail plate incision down @ an angle in order to wedge out the offending cryptotic portion of the nail platein toto.   Chemical cauterization of subungual granuloma w/ silver nitrate R 4th toe.  The area was cleansed w/ alcohol. Bandaid applied. Patient tolerated the procedure well & related significant relief.    Discussed again tx options including  -temporary nail removal 4th toe R under LA, w/ PO antifungal tx until nail regrowth (previous DPM had also advised of option to perform matrixectomy, if recurrent & difficult to self manage), after resolution or before recurrence of ssx VS.   -appropriate self debridement of nails w/ correct instrumentation to prevent recurrent SSX.  (Instructions already provided on OTC topical antifungal Tx options).  -permanent nail border removal under LA, w/ phenol-alcohol chemical matrixectomy.     Patient would like to schedule @ earliest convenience P&A w/ avulsion total nail plate R 4th toe under LA.        A total of 34 mins.was spent on chart review, patient visit & documentation.

## 2024-09-04 NOTE — PROCEDURES
Nail debridement R 4th toe    Date/Time: 9/4/2024 9:45 AM    Performed by: Shell Edwards DPM  Authorized by: Shell Edwards DPM    Consent Done?:  Yes (Verbal)    Nail Care Type:  Debride(Right 4th Toe)  Patient tolerance:  Patient tolerated the procedure well with no immediate complications     Chemical cauterization of subungual granuloma w/ silver nitrate

## 2024-09-12 NOTE — PROGRESS NOTES
Subjective:      Patient ID: Javier Enciso is a 49 y.o. female.    Chief Complaint: Follow-up (PNA)    Patient is here for P&A R 4th toe d/t pain from recurrent infection of IGTN w/ subungual granuloma.  9/4/24  Patient c/o recurrent bleeding w/ trimming of R 4th toenail, as well as pain to direct pressure continuous when nail plate has grown back. Wants to discuss tx options; ultimately will schedule permanent nail plate under local, encouraged per stepmom who accompanies here.  Also has callus (L foot) & nails long - doesn't go regularly to pedicure.  7/31/24  Javier is a 49 y.o. female who presents new to the clinic c/o  painful IGTN on the 4th toe R foot w/ underlying growth of skin x yrs; no h/o known injury. Also has thick & discolored nails B/L that tend to ingrow.  She was told she has diabetic neuropathy - tingling, cramping, stinging & crawling sensation on & off.  Also deals with a lot of pain L knee & associated leg pain; pain level 6/10 -sees ortho.  Normally in flat sandals including today but @ do not get home as well.    Last saw podiatrist about 3 years ago outside [facility].    Current shoe gear; flat sandal.  Has diabetic shoes.    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)    Anxiety    Pure hypercholesterolemia    Migraine without aura and without status migrainosus, not intractable    Gastroesophageal reflux disease without esophagitis    Vitamin D deficiency    Primary hypertension    Type 2 diabetes mellitus with diabetic polyneuropathy, with long-term current use of insulin    Overweight (BMI 25.0-29.9)    Postoperative hypothyroidism    History of thyroidectomy, total    Knee joint disorder  (narrowing), left    Type 2 diabetes mellitus with hyperglycemia, with long-term current use of insulin    ROBERTH (obstructive sleep apnea)    Encounter for screening colonoscopy    Insulin pump in place    Insulin pump fitting or adjustment    Decreased range of motion      Hemoglobin A1C   Date Value Ref Range Status   07/02/2024 7.0 (H) 4.0 - 5.6 % Final     Comment:     ADA Screening Guidelines:  5.7-6.4%  Consistent with prediabetes  >or=6.5%  Consistent with diabetes    High levels of fetal hemoglobin interfere with the HbA1C  assay. Heterozygous hemoglobin variants (HbS, HgC, etc)do  not significantly interfere with this assay.   However, presence of multiple variants may affect accuracy.     03/01/2024 6.9 (H) 4.0 - 5.6 % Final     Comment:     ADA Screening Guidelines:  5.7-6.4%  Consistent with prediabetes  >or=6.5%  Consistent with diabetes    High levels of fetal hemoglobin interfere with the HbA1C  assay. Heterozygous hemoglobin variants (HbS, HgC, etc)do  not significantly interfere with this assay.   However, presence of multiple variants may affect accuracy.     09/11/2023 7.3 (H) 4.0 - 5.6 % Final     Comment:     ADA Screening Guidelines:  5.7-6.4%  Consistent with prediabetes  >or=6.5%  Consistent with diabetes    High levels of fetal hemoglobin interfere with the HbA1C  assay. Heterozygous hemoglobin variants (HbS, HgC, etc)do  not significantly interfere with this assay.   However, presence of multiple variants may affect accuracy.       Objective:      Review of Systems   Constitutional: Negative for malaise/fatigue.   Cardiovascular:  Negative for leg swelling.   Skin:  Positive for color change, dry skin and nail changes. Negative for itching, rash and suspicious lesions.   Musculoskeletal:  Positive for back pain, muscle cramps and myalgias. Negative for falls and muscle weakness.   Neurological:  Positive for paresthesias and sensory change. Negative for focal weakness, numbness and weakness.    Psychiatric/Behavioral:  The patient is nervous/anxious.      Physical Exam  Vitals reviewed.   Constitutional:       General: She is not in acute distress.     Appearance: She is well-developed and overweight.   Cardiovascular:      Pulses: Normal pulses.           Posterior tibial pulses are 2+ on the right side and 2+ on the left side.   Musculoskeletal:         General: Tenderness present. No swelling.      Right lower leg: No edema.      Left lower leg: No edema.      Right foot: Deformity (normal/ high MLA B/L) and bunion present.      Left foot: Deformity (hammertoes B/L) and bunion (mild HAV B/L) present.   Feet:      Right foot:      Skin integrity: Dry skin present.      Toenail Condition: Right toenails are ingrown. Fungal disease present.     Left foot:      Skin integrity: Dry skin present.      Toenail Condition: Left toenails are ingrown. Fungal disease present.     Comments: Toenails 1st, 2nd, 3rd, 4th, 5th  B/L are wide & cryptotic. Also, B/L distal L>R hallux, 4th & lateral 5th R are thickened, dystrophic, discolored, w/ crumbly subungual debris. Tender to distal nail plate pressure 4th toe R.  Skin:     General: Skin is warm and dry.      Capillary Refill: Capillary refill takes less than 2 seconds.      Findings: Lesion (Subungual granuloma 4th R) present. No bruising, erythema or rash.      Comments: IPK medial HIPJ B/L as well as L sub 5th met.head.    Neurological:      Mental Status: She is alert and oriented to person, place, and time.      Gait: Gait normal.      Comments: Paresthesias including tingling, stinging cramping & crawling sensation B/L feet w/ no clearly identified trigger or source; no hyperemia.    No TTP IMS B/L nor w/ lateral met.head compression. Fullness B/L 2nd & R 3rd; + palpable clicking/ + Stephany's sign 2nd L & 3rd R MS w/ lateral met.head compression.         Psychiatric:         Mood and Affect: Affect normal. Mood is anxious.         Behavior: Behavior normal.  Behavior is cooperative.         Assessment:      Encounter Diagnoses   Name Primary?    Ingrown toenail of right foot with infection Yes    Type 2 diabetes mellitus with diabetic polyneuropathy, with long-term current use of insulin     History of ingrown nail        Problem List Items Addressed This Visit          Endocrine    Type 2 diabetes mellitus with diabetic polyneuropathy, with long-term current use of insulin     Other Visit Diagnoses       Ingrown toenail of right foot with infection    -  Primary    History of ingrown nail        Relevant Orders    Nail Removal Permanent/ P&A 4th toe R           Plan:       Javier was seen today for follow-up.    Diagnoses and all orders for this visit:    Ingrown toenail of right foot with infection    Type 2 diabetes mellitus with diabetic polyneuropathy, with long-term current use of insulin    History of ingrown nail  -     Nail Removal Permanent/ P&A 4th toe R    I counseled the patient on her conditions, their implications & medical mgmt.    - Shoe inspection. Diabetic Foot Education. Patient reminded of the importance of good blood sugar control to help prevent podiatric complications of diabetes. Patient instructed on proper foot hygeine. We discussed wearing proper shoe gear, daily foot inspections, never walking w/out protective shoe gear, annual DM foot exam, sooner prn.       -permanent nail border removal total 4th R under LA, w/ phenol-alcohol chemical matrixectomy.         A total of 25 mins.was spent on chart review, patient visit & documentation.

## 2024-09-23 ENCOUNTER — OFFICE VISIT (OUTPATIENT)
Dept: PODIATRY | Facility: CLINIC | Age: 49
End: 2024-09-23
Payer: MEDICAID

## 2024-09-23 VITALS
SYSTOLIC BLOOD PRESSURE: 125 MMHG | WEIGHT: 220 LBS | HEIGHT: 72 IN | DIASTOLIC BLOOD PRESSURE: 86 MMHG | BODY MASS INDEX: 29.8 KG/M2 | HEART RATE: 78 BPM

## 2024-09-23 DIAGNOSIS — E11.42 TYPE 2 DIABETES MELLITUS WITH DIABETIC POLYNEUROPATHY, WITH LONG-TERM CURRENT USE OF INSULIN: ICD-10-CM

## 2024-09-23 DIAGNOSIS — L60.0 INGROWN TOENAIL OF RIGHT FOOT WITH INFECTION: Primary | ICD-10-CM

## 2024-09-23 DIAGNOSIS — Z79.4 TYPE 2 DIABETES MELLITUS WITH DIABETIC POLYNEUROPATHY, WITH LONG-TERM CURRENT USE OF INSULIN: ICD-10-CM

## 2024-09-23 DIAGNOSIS — Z87.2 HISTORY OF INGROWN NAIL: ICD-10-CM

## 2024-09-23 PROCEDURE — 3061F NEG MICROALBUMINURIA REV: CPT | Mod: CPTII,,, | Performed by: PODIATRIST

## 2024-09-23 PROCEDURE — 3051F HG A1C>EQUAL 7.0%<8.0%: CPT | Mod: CPTII,,, | Performed by: PODIATRIST

## 2024-09-23 PROCEDURE — 99214 OFFICE O/P EST MOD 30 MIN: CPT | Mod: PBBFAC,PN | Performed by: PODIATRIST

## 2024-09-23 PROCEDURE — 3074F SYST BP LT 130 MM HG: CPT | Mod: CPTII,,, | Performed by: PODIATRIST

## 2024-09-23 PROCEDURE — 4010F ACE/ARB THERAPY RXD/TAKEN: CPT | Mod: CPTII,,, | Performed by: PODIATRIST

## 2024-09-23 PROCEDURE — 3066F NEPHROPATHY DOC TX: CPT | Mod: CPTII,,, | Performed by: PODIATRIST

## 2024-09-23 PROCEDURE — 3008F BODY MASS INDEX DOCD: CPT | Mod: CPTII,,, | Performed by: PODIATRIST

## 2024-09-23 PROCEDURE — 99213 OFFICE O/P EST LOW 20 MIN: CPT | Mod: 25,S$PBB,, | Performed by: PODIATRIST

## 2024-09-23 PROCEDURE — 3079F DIAST BP 80-89 MM HG: CPT | Mod: CPTII,,, | Performed by: PODIATRIST

## 2024-09-23 PROCEDURE — 11750 EXCISION NAIL&NAIL MATRIX: CPT | Mod: PBBFAC,PN | Performed by: PODIATRIST

## 2024-09-23 PROCEDURE — 1159F MED LIST DOCD IN RCRD: CPT | Mod: CPTII,,, | Performed by: PODIATRIST

## 2024-09-23 PROCEDURE — 99999 PR PBB SHADOW E&M-EST. PATIENT-LVL IV: CPT | Mod: PBBFAC,,, | Performed by: PODIATRIST

## 2024-09-23 NOTE — PROCEDURES
Nail Removal Permanent/ P&A 4th toe R    Date/Time: 9/23/2024 8:30 AM    Performed by: Shell Edwards DPM  Authorized by: Shell Edwards DPM    Consent Done?:  Yes (Verbal)  Location:     Location:  Right foot    Location detail:  Right fourth toe  Anesthesia:     Anesthesia:  Digital block    Local anesthetic:  Lidocaine 2% without epinephrine and bupivacaine 0.5% without epinephrine    Anesthetic total (ml):  3  Procedure Details:     Preparation:  Skin prepped with alcohol    Amount removed:  Complete    Wedge excision of skin of nail fold: No      Nail bed sutured?: No      Nail matrix removed:  Complete    Dressing applied:  Dressing applied    Patient tolerance:  Patient tolerated the procedure well with no immediate complications

## 2024-09-25 ENCOUNTER — PATIENT MESSAGE (OUTPATIENT)
Dept: DIABETES | Facility: CLINIC | Age: 49
End: 2024-09-25
Payer: MEDICAID

## 2024-09-28 ENCOUNTER — HOSPITAL ENCOUNTER (EMERGENCY)
Facility: HOSPITAL | Age: 49
Discharge: HOME | End: 2024-09-29
Attending: EMERGENCY MEDICINE
Payer: MEDICAID

## 2024-09-28 DIAGNOSIS — M25.521 RIGHT ELBOW PAIN: ICD-10-CM

## 2024-09-28 PROCEDURE — 99283 EMERGENCY DEPT VISIT LOW MDM: CPT | Mod: 25

## 2024-09-28 RX ORDER — IBUPROFEN 600 MG/1
600 TABLET ORAL
Status: DISCONTINUED | OUTPATIENT
Start: 2024-09-28 | End: 2024-09-28

## 2024-09-28 RX ORDER — SULFAMETHOXAZOLE AND TRIMETHOPRIM 800; 160 MG/1; MG/1
1 TABLET ORAL
Status: DISCONTINUED | OUTPATIENT
Start: 2024-09-28 | End: 2024-09-28

## 2024-09-29 VITALS
WEIGHT: 220 LBS | BODY MASS INDEX: 29.8 KG/M2 | TEMPERATURE: 98 F | HEART RATE: 65 BPM | SYSTOLIC BLOOD PRESSURE: 115 MMHG | RESPIRATION RATE: 18 BRPM | OXYGEN SATURATION: 99 % | HEIGHT: 72 IN | DIASTOLIC BLOOD PRESSURE: 79 MMHG

## 2024-09-29 PROCEDURE — 25000003 PHARM REV CODE 250: Performed by: EMERGENCY MEDICINE

## 2024-09-29 RX ORDER — KETOROLAC TROMETHAMINE 10 MG/1
10 TABLET, FILM COATED ORAL EVERY 6 HOURS PRN
Qty: 10 TABLET | Refills: 0 | Status: SHIPPED | OUTPATIENT
Start: 2024-09-29 | End: 2024-10-02

## 2024-09-29 RX ORDER — KETOROLAC TROMETHAMINE 10 MG/1
10 TABLET, FILM COATED ORAL
Status: COMPLETED | OUTPATIENT
Start: 2024-09-29 | End: 2024-09-29

## 2024-09-29 RX ADMIN — KETOROLAC TROMETHAMINE 10 MG: 10 TABLET, FILM COATED ORAL at 01:09

## 2024-09-29 NOTE — ED NOTES
ACE wrap applied to right elbow, tolerated well. Instructed pt to loosen if it becomes too tight, voiced understanding.

## 2024-09-29 NOTE — ED PROVIDER NOTES
"Encounter Date: 9/28/2024       History     Chief Complaint   Patient presents with    Arm Injury     Occurred 10 days ago when pt hit her right arm on the bathroom sink. States arm is "burning"     This is a 49-year-old female presenting with chief complaint of right elbow pain.  She says it began when she hit the lateral aspect of her right elbow on a kitchen sink a week and a half ago.  She says the pain has persisted and radiates into her forearm and upper arm.  She describes the pain as a burning.    The history is provided by the patient.     Review of patient's allergies indicates:   Allergen Reactions    Hydrocodone Itching    Hydrocodone-acetaminophen      Itchy (skin)^     Past Medical History:   Diagnosis Date    Allergies     Diabetes mellitus, type 2     High cholesterol     Hypertension     Sleep apnea, unspecified     Thyroid disease      Past Surgical History:   Procedure Laterality Date    BREAST BIOPSY      BREAST CYST ASPIRATION Left 1996    BREAST CYST ASPIRATION Right 2001    COLONOSCOPY N/A 5/29/2024    Procedure: COLONOSCOPY;  Surgeon: Ralph Clark MD;  Location: [facility];  Service: Endoscopy;  Laterality: N/A;    ESOPHAGOGASTRODUODENOSCOPY N/A 10/26/2022    Procedure: EGD (ESOPHAGOGASTRODUODENOSCOPY);  Surgeon: Dale Bray MD;  Location: [facility];  Service: Endoscopy;  Laterality: N/A;    HYSTERECTOMY      KNEE ARTHROSCOPY W/ MENISCECTOMY Left 01/09/2023    Procedure: ARTHROSCOPY, KNEE, WITH MENISCECTOMY;  Surgeon: Tim Soto MD;  Location: [facility];  Service: Orthopedics;  Laterality: Left;  Linv[employer] video   fast fix    L WRIST SURGERY Left     THYROID SURGERY       Family History   Problem Relation Name Age of Onset    Diabetes Mother      Diabetes Father      Breast cancer Maternal Aunt      Glaucoma Maternal Aunt       Social History     Tobacco Use    Smoking status: Former     Types: Cigarettes    Smokeless tobacco: Never   Substance Use Topics    Alcohol use: " Yes     Comment: rare    Drug use: Never     Review of Systems   Musculoskeletal:  Positive for arthralgias.   All other systems reviewed and are negative.      Physical Exam     Initial Vitals [09/28/24 2214]   BP Pulse Resp Temp SpO2   (!) 140/93 82 18 98.3 °F (36.8 °C) 99 %      MAP       --         Physical Exam    Nursing note and vitals reviewed.  Constitutional: She appears well-developed and well-nourished. She is not diaphoretic. No distress.   HENT:   Head: Normocephalic.   Eyes: Conjunctivae are normal.   Neck: Neck supple.   Normal range of motion.  Cardiovascular:  Normal rate.           Pulmonary/Chest: No respiratory distress.   Abdominal: She exhibits no distension.   Musculoskeletal:         General: Tenderness present. No edema.      Cervical back: Normal range of motion and neck supple.      Comments: Tenderness of the right lateral elbow.  No obvious swelling/effusion.  Full range of motion is intact.  2+ radial pulse.     Neurological: She is alert. She has normal strength.   Skin: Skin is warm and dry.   Psychiatric: She has a normal mood and affect.         ED Course   Procedures  Labs Reviewed - No data to display       Imaging Results              X-Ray Elbow Complete Right (In process)                      Medications   ketorolac tablet 10 mg (10 mg Oral Given 9/29/24 0103)     Medical Decision Making  X-ray shows no evidence of fracture.  No evidence of significant inflammation or infection on exam.  Patient was reassured.  Discharged home with short course of oral Toradol.  Advised follow up with her orthopedist.    Amount and/or Complexity of Data Reviewed  Radiology: ordered.    Risk  Prescription drug management.                                      Clinical Impression:  Final diagnoses:  [M25.521] Right elbow pain          ED Disposition Condition    Discharge Stable          ED Prescriptions       Medication Sig Dispense Start Date End Date Auth. Provider    ketorolac (TORADOL) 10 mg  tablet Take 1 tablet (10 mg total) by mouth every 6 (six) hours as needed for Pain. 10 tablet 9/29/2024 10/2/2024 Montez Gonzalez MD          Follow-up Information       Follow up With Specialties Details Why Contact Info    Tootie Estrella NP Hospitalist, Emergency Medicine, Internal Medicine   [address] [facility] [address]  [phone]               Montez Gonzalez MD  09/29/24 0457

## 2024-10-15 ENCOUNTER — PATIENT MESSAGE (OUTPATIENT)
Dept: DIABETES | Facility: CLINIC | Age: 49
End: 2024-10-15
Payer: MEDICAID

## 2024-10-17 NOTE — PROGRESS NOTES
Subjective:      Patient ID: Javier Enciso is a 49 y.o. female.    Chief Complaint: Follow-up (Right 4th toe)    Patient is PO 4th toenail P&A. Residual tenderness pain level 1/10. Some dark discoloration in immediate area.  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  9/4/24  Patient c/o recurrent bleeding w/ trimming of R 4th toenail, as well as pain to direct pressure continuous when nail plate has grown back. Wants to discuss tx options; ultimately will schedule permanent nail plate under local, encouraged per stepmom who accompanies here.  Also has callus (L foot) & nails long - doesn't go regularly to pedicure.  7/31/24  Javier is a 49 y.o. female who presents new to the clinic c/o  painful IGTN on the 4th toe R foot w/ underlying growth of skin x yrs; no h/o known injury. Also has thick & discolored nails B/L that tend to ingrow.  She was told she has diabetic neuropathy - tingling, cramping, stinging & crawling sensation on & off.  Also deals with a lot of pain L knee & associated leg pain; pain level 6/10 -sees ortho.  Normally in flat sandals including today but @ do not get home as well.    Last saw podiatrist about 3 years ago outside [facility].    Current shoe gear; flat sandal.  Has diabetic shoes.    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)    Anxiety    Pure hypercholesterolemia    Migraine without aura and without status migrainosus, not intractable    Gastroesophageal reflux disease without esophagitis    Vitamin D deficiency    Primary hypertension    Type 2 diabetes mellitus with diabetic polyneuropathy, with long-term  current use of insulin    Overweight (BMI 25.0-29.9)    Postoperative hypothyroidism    History of thyroidectomy, total    Knee joint disorder (narrowing), left    Type 2 diabetes mellitus with hyperglycemia, with long-term current use of insulin    ROBERTH (obstructive sleep apnea)    Encounter for screening colonoscopy    Insulin pump in place    Insulin pump fitting or adjustment    Decreased range of motion      Hemoglobin A1C   Date Value Ref Range Status   11/02/2024 6.9 (H) 4.5 - 6.2 % Final     Comment:     ADA Screening Guidelines:  5.7-6.4%  Consistent with prediabetes  >or=6.5%  Consistent with diabetes    High levels of fetal hemoglobin interfere with the HbA1C  assay. Heterozygous hemoglobin variants (HbS, HgC, etc)do  not significantly interfere with this assay.   However, presence of multiple variants may affect accuracy.     07/02/2024 7.0 (H) 4.0 - 5.6 % Final     Comment:     ADA Screening Guidelines:  5.7-6.4%  Consistent with prediabetes  >or=6.5%  Consistent with diabetes    High levels of fetal hemoglobin interfere with the HbA1C  assay. Heterozygous hemoglobin variants (HbS, HgC, etc)do  not significantly interfere with this assay.   However, presence of multiple variants may affect accuracy.     03/01/2024 6.9 (H) 4.0 - 5.6 % Final     Comment:     ADA Screening Guidelines:  5.7-6.4%  Consistent with prediabetes  >or=6.5%  Consistent with diabetes    High levels of fetal hemoglobin interfere with the HbA1C  assay. Heterozygous hemoglobin variants (HbS, HgC, etc)do  not significantly interfere with this assay.   However, presence of multiple variants may affect accuracy.       Objective:      Review of Systems   Constitutional: Negative for malaise/fatigue.   Cardiovascular:  Negative for leg swelling.   Skin:  Positive for color change, dry skin and nail changes. Negative for itching, rash and suspicious lesions.   Musculoskeletal:  Positive for back pain, muscle cramps and myalgias. Negative for  falls and muscle weakness.   Neurological:  Positive for paresthesias and sensory change. Negative for focal weakness, numbness and weakness.   Psychiatric/Behavioral:  The patient is nervous/anxious.      Physical Exam  Vitals reviewed.   Constitutional:       General: She is not in acute distress.     Appearance: She is well-developed and overweight.   Cardiovascular:      Pulses: Normal pulses.           Posterior tibial pulses are 2+ on the right side and 2+ on the left side.   Musculoskeletal:         General: Tenderness present. No swelling.      Right lower leg: No edema.      Left lower leg: No edema.      Right foot: Deformity (normal/ high MLA B/L) and bunion present.      Left foot: Deformity (hammertoes B/L) and bunion (mild HAV B/L) present.   Feet:      Right foot:      Skin integrity: Dry skin present.      Toenail Condition: Right toenails are ingrown. Fungal disease present.     Left foot:      Skin integrity: Dry skin present.      Toenail Condition: Left toenails are ingrown. Fungal disease present.     Comments: Toenails 1st, 2nd, 3rd, 4th, 5th  B/L are wide & cryptotic. Also, B/L distal L>R hallux, 4th & lateral 5th R are thickened, dystrophic, discolored, w/ crumbly subungual debris.   Skin:     General: Skin is warm and dry.      Capillary Refill: Capillary refill takes less than 2 seconds.      Findings: Lesion (Subungual granuloma 4th R resolved) present. No bruising, erythema or rash.      Comments: IPK medial HIPJ B/L as well as L sub 5th met.head.     Healed nail bed R 4th w/ no remaining granuloma; light eschar proximally. Proximal eponychium w/ residual discoloration consistent w/ residual inflammation only. No TTP.   Neurological:      Mental Status: She is alert and oriented to person, place, and time.      Gait: Gait normal.      Comments: Paresthesias including tingling, stinging cramping & crawling sensation B/L feet w/ no clearly identified trigger or source; no hyperemia.    No  TTP IMS B/L nor w/ lateral met.head compression. Fullness B/L 2nd & R 3rd; + palpable clicking/ + Stephany's sign 2nd L & 3rd R MS w/ lateral met.head compression.         Psychiatric:         Mood and Affect: Affect normal. Mood is anxious.         Behavior: Behavior normal. Behavior is cooperative.         Assessment:      Encounter Diagnoses   Name Primary?    Pain around toenail, right foot Yes    Postop check     Type 2 diabetes mellitus with diabetic polyneuropathy, with long-term current use of insulin     Pyogenic granuloma of skin        Problem List Items Addressed This Visit          Endocrine    Type 2 diabetes mellitus with diabetic polyneuropathy, with long-term current use of insulin     Other Visit Diagnoses       Pain around toenail, right foot    -  Primary    Postop check        Pyogenic granuloma of skin               Plan:       Javier was seen today for follow-up.    Diagnoses and all orders for this visit:    Pain around toenail, right foot    Postop check    Type 2 diabetes mellitus with diabetic polyneuropathy, with long-term current use of insulin    Pyogenic granuloma of skin    I counseled the patient on her conditions, their implications & medical mgmt.    - Shoe inspection. Diabetic Foot Education. Patient reminded of the importance of good blood sugar control to help prevent podiatric complications of diabetes. Patient instructed on proper foot hygeine. We discussed wearing proper shoe gear, daily foot inspections, never walking w/out protective shoe gear, annual DM foot exam, sooner prn.       Light eschar debrided.        A total of 20 mins.was spent on chart review, patient visit & documentation.

## 2024-10-23 ENCOUNTER — OFFICE VISIT (OUTPATIENT)
Dept: PODIATRY | Facility: CLINIC | Age: 49
End: 2024-10-23
Payer: MEDICAID

## 2024-10-23 VITALS
SYSTOLIC BLOOD PRESSURE: 113 MMHG | HEIGHT: 72 IN | WEIGHT: 219.69 LBS | BODY MASS INDEX: 29.76 KG/M2 | HEART RATE: 72 BPM | DIASTOLIC BLOOD PRESSURE: 77 MMHG

## 2024-10-23 DIAGNOSIS — Z09 POSTOP CHECK: ICD-10-CM

## 2024-10-23 DIAGNOSIS — E11.42 TYPE 2 DIABETES MELLITUS WITH DIABETIC POLYNEUROPATHY, WITH LONG-TERM CURRENT USE OF INSULIN: ICD-10-CM

## 2024-10-23 DIAGNOSIS — L98.0 PYOGENIC GRANULOMA OF SKIN: ICD-10-CM

## 2024-10-23 DIAGNOSIS — Z79.4 TYPE 2 DIABETES MELLITUS WITH DIABETIC POLYNEUROPATHY, WITH LONG-TERM CURRENT USE OF INSULIN: ICD-10-CM

## 2024-10-23 DIAGNOSIS — M79.674 PAIN AROUND TOENAIL, RIGHT FOOT: Primary | ICD-10-CM

## 2024-10-23 PROCEDURE — 99999 PR PBB SHADOW E&M-EST. PATIENT-LVL IV: CPT | Mod: PBBFAC,,, | Performed by: PODIATRIST

## 2024-10-23 PROCEDURE — 99213 OFFICE O/P EST LOW 20 MIN: CPT | Mod: S$PBB,,, | Performed by: PODIATRIST

## 2024-10-23 PROCEDURE — 99214 OFFICE O/P EST MOD 30 MIN: CPT | Mod: PBBFAC,PN | Performed by: PODIATRIST

## 2024-10-23 PROCEDURE — 3074F SYST BP LT 130 MM HG: CPT | Mod: CPTII,,, | Performed by: PODIATRIST

## 2024-10-23 PROCEDURE — 4010F ACE/ARB THERAPY RXD/TAKEN: CPT | Mod: CPTII,,, | Performed by: PODIATRIST

## 2024-10-23 PROCEDURE — 3066F NEPHROPATHY DOC TX: CPT | Mod: CPTII,,, | Performed by: PODIATRIST

## 2024-10-23 PROCEDURE — 3044F HG A1C LEVEL LT 7.0%: CPT | Mod: CPTII,,, | Performed by: PODIATRIST

## 2024-10-23 PROCEDURE — 3078F DIAST BP <80 MM HG: CPT | Mod: CPTII,,, | Performed by: PODIATRIST

## 2024-10-23 PROCEDURE — 3008F BODY MASS INDEX DOCD: CPT | Mod: CPTII,,, | Performed by: PODIATRIST

## 2024-10-23 PROCEDURE — 1159F MED LIST DOCD IN RCRD: CPT | Mod: CPTII,,, | Performed by: PODIATRIST

## 2024-10-23 PROCEDURE — 3061F NEG MICROALBUMINURIA REV: CPT | Mod: CPTII,,, | Performed by: PODIATRIST

## 2024-10-28 ENCOUNTER — PATIENT MESSAGE (OUTPATIENT)
Dept: PRIMARY CARE CLINIC | Facility: CLINIC | Age: 49
End: 2024-10-28
Payer: MEDICAID

## 2024-11-02 ENCOUNTER — LAB VISIT (OUTPATIENT)
Dept: LAB | Facility: HOSPITAL | Age: 49
End: 2024-11-02
Attending: NURSE PRACTITIONER
Payer: MEDICAID

## 2024-11-02 DIAGNOSIS — E13.9 LADA (LATENT AUTOIMMUNE DIABETES MELLITUS IN ADULTS): ICD-10-CM

## 2024-11-02 LAB
ANION GAP SERPL CALC-SCNC: 11 MMOL/L (ref 8–16)
BUN SERPL-MCNC: 12 MG/DL (ref 6–20)
CALCIUM SERPL-MCNC: 9.2 MG/DL (ref 8.7–10.5)
CHLORIDE SERPL-SCNC: 106 MMOL/L (ref 95–110)
CO2 SERPL-SCNC: 23 MMOL/L (ref 23–29)
CREAT SERPL-MCNC: 0.9 MG/DL (ref 0.5–1.4)
EST. GFR  (NO RACE VARIABLE): >60 ML/MIN/1.73 M^2
ESTIMATED AVG GLUCOSE: 151 MG/DL (ref 68–131)
GLUCOSE SERPL-MCNC: 116 MG/DL (ref 70–110)
HBA1C MFR BLD: 6.9 % (ref 4.5–6.2)
POTASSIUM SERPL-SCNC: 3.7 MMOL/L (ref 3.5–5.1)
SODIUM SERPL-SCNC: 140 MMOL/L (ref 136–145)

## 2024-11-02 PROCEDURE — 80048 BASIC METABOLIC PNL TOTAL CA: CPT | Performed by: NURSE PRACTITIONER

## 2024-11-02 PROCEDURE — 83036 HEMOGLOBIN GLYCOSYLATED A1C: CPT | Performed by: NURSE PRACTITIONER

## 2024-11-06 DIAGNOSIS — E13.9 LADA (LATENT AUTOIMMUNE DIABETES MELLITUS IN ADULTS): ICD-10-CM

## 2024-11-07 RX ORDER — INSULIN GLARGINE 100 [IU]/ML
16 INJECTION, SOLUTION SUBCUTANEOUS NIGHTLY
Qty: 9 ML | Refills: 6 | Status: SHIPPED | OUTPATIENT
Start: 2024-11-07 | End: 2025-11-07

## 2024-11-11 ENCOUNTER — OFFICE VISIT (OUTPATIENT)
Dept: DIABETES | Facility: CLINIC | Age: 49
End: 2024-11-11
Payer: MEDICAID

## 2024-11-11 VITALS
BODY MASS INDEX: 29.55 KG/M2 | OXYGEN SATURATION: 95 % | HEART RATE: 80 BPM | DIASTOLIC BLOOD PRESSURE: 72 MMHG | SYSTOLIC BLOOD PRESSURE: 114 MMHG | WEIGHT: 224 LBS

## 2024-11-11 DIAGNOSIS — Z96.41 INSULIN PUMP IN PLACE: ICD-10-CM

## 2024-11-11 DIAGNOSIS — E89.0 HISTORY OF THYROIDECTOMY, TOTAL: ICD-10-CM

## 2024-11-11 DIAGNOSIS — Z46.81 INSULIN PUMP FITTING OR ADJUSTMENT: ICD-10-CM

## 2024-11-11 DIAGNOSIS — Z71.9 HEALTH EDUCATION/COUNSELING: ICD-10-CM

## 2024-11-11 DIAGNOSIS — E89.0 POSTOPERATIVE HYPOTHYROIDISM: ICD-10-CM

## 2024-11-11 DIAGNOSIS — T38.3X5D: ICD-10-CM

## 2024-11-11 DIAGNOSIS — E11.42 TYPE 2 DIABETES MELLITUS WITH DIABETIC POLYNEUROPATHY, WITH LONG-TERM CURRENT USE OF INSULIN: ICD-10-CM

## 2024-11-11 DIAGNOSIS — Z79.4 TYPE 2 DIABETES MELLITUS WITH DIABETIC POLYNEUROPATHY, WITH LONG-TERM CURRENT USE OF INSULIN: ICD-10-CM

## 2024-11-11 DIAGNOSIS — E66.3 OVERWEIGHT (BMI 25.0-29.9): ICD-10-CM

## 2024-11-11 DIAGNOSIS — E13.9 LADA (LATENT AUTOIMMUNE DIABETES MELLITUS IN ADULTS): Primary | ICD-10-CM

## 2024-11-11 DIAGNOSIS — E78.00 PURE HYPERCHOLESTEROLEMIA: Chronic | ICD-10-CM

## 2024-11-11 DIAGNOSIS — I10 PRIMARY HYPERTENSION: Chronic | ICD-10-CM

## 2024-11-11 PROCEDURE — 3008F BODY MASS INDEX DOCD: CPT | Mod: CPTII,,, | Performed by: NURSE PRACTITIONER

## 2024-11-11 PROCEDURE — 1159F MED LIST DOCD IN RCRD: CPT | Mod: CPTII,,, | Performed by: NURSE PRACTITIONER

## 2024-11-11 PROCEDURE — 3078F DIAST BP <80 MM HG: CPT | Mod: CPTII,,, | Performed by: NURSE PRACTITIONER

## 2024-11-11 PROCEDURE — 4010F ACE/ARB THERAPY RXD/TAKEN: CPT | Mod: CPTII,,, | Performed by: NURSE PRACTITIONER

## 2024-11-11 PROCEDURE — 3074F SYST BP LT 130 MM HG: CPT | Mod: CPTII,,, | Performed by: NURSE PRACTITIONER

## 2024-11-11 PROCEDURE — 99999 PR PBB SHADOW E&M-EST. PATIENT-LVL V: CPT | Mod: PBBFAC,,, | Performed by: NURSE PRACTITIONER

## 2024-11-11 PROCEDURE — 3061F NEG MICROALBUMINURIA REV: CPT | Mod: CPTII,,, | Performed by: NURSE PRACTITIONER

## 2024-11-11 PROCEDURE — 3044F HG A1C LEVEL LT 7.0%: CPT | Mod: CPTII,,, | Performed by: NURSE PRACTITIONER

## 2024-11-11 PROCEDURE — 95251 CONT GLUC MNTR ANALYSIS I&R: CPT | Mod: ,,, | Performed by: NURSE PRACTITIONER

## 2024-11-11 PROCEDURE — 99214 OFFICE O/P EST MOD 30 MIN: CPT | Mod: S$PBB,,, | Performed by: NURSE PRACTITIONER

## 2024-11-11 PROCEDURE — 1160F RVW MEDS BY RX/DR IN RCRD: CPT | Mod: CPTII,,, | Performed by: NURSE PRACTITIONER

## 2024-11-11 PROCEDURE — 99215 OFFICE O/P EST HI 40 MIN: CPT | Mod: PBBFAC,PN | Performed by: NURSE PRACTITIONER

## 2024-11-11 PROCEDURE — 3066F NEPHROPATHY DOC TX: CPT | Mod: CPTII,,, | Performed by: NURSE PRACTITIONER

## 2024-11-11 RX ORDER — INSULIN ASPART INJECTION 100 [IU]/ML
INJECTION, SOLUTION SUBCUTANEOUS
Qty: 30 ML | Refills: 11 | Status: SHIPPED | OUTPATIENT
Start: 2024-11-11

## 2024-11-11 RX ORDER — BLOOD-GLUCOSE SENSOR
1 EACH MISCELLANEOUS
Qty: 3 EACH | Refills: 11 | Status: SHIPPED | OUTPATIENT
Start: 2024-11-11 | End: 2025-11-11

## 2024-11-11 RX ORDER — INSULIN PMP CART,AUT,G6/7,CNTR
1 EACH SUBCUTANEOUS
Qty: 15 EACH | Refills: 11 | Status: SHIPPED | OUTPATIENT
Start: 2024-11-11

## 2024-11-11 NOTE — PROGRESS NOTES
CC:   Chief Complaint   Patient presents with    Diabetes Mellitus       HPI: Javier Enciso is a 49 y.o. female presents for a follow up visit today for the management of T2DM.     She was diagnosed with prediabetes in 2018 and was on Metformin, developed T2DM around 2020. She went to the ER in December 2021 and she was started on insulin therapy.     Family hx of diabetes: mother and father   Hospitalized for diabetes: denies   Insulin therapy: December 2021 4/2022- TOM + 0.04 - slightly positive   4/2022- c-peptide level elevated 5.53  10/2022- repeat c-peptide level  elevated 5.72    No personal or FH of thyroid cancer or personal of pancreatic cancer or pancreatitis.     Hx of thyroidectomy due to cysts- January 2012-- not cancerous. LT4 150 mcg daily   Last TSH WNL --she reports that she is taking her levothyroxine       left knee pain   Seeing a  orthopedic surgeon- Teja to get a revision of her knee surgery that Otiliokenneth did   Had surgery last month -- doing well     Recently had a hairline fracture to her right elbow   She is on her second steroid pack - her sugars are very high       Our last visit was in July of 2024  At that visit we continued her Omnipod 5 with the current settings  We discussed pump optimization  Continued the Fiasp insulin   Her A1c did improve to 6.9%---want to try to get her closer to 6.5%  She recently received a steroid pack which she reports is the 2nd 1 that she has had----this is likely impacting her blood sugars  Her blood sugar was over 250 and office on her Omnipod  We discussed that we can use a steroid setting for when she gets the steroids  She will also need more insulin with her meals  See attached downloads    She would prefer to upgrade to the Dexcom G7                              DIABETES COMPLICATIONS: peripheral neuropathy      Diabetes Management Status    ASA: stopped ASA     Statin: Taking--Crestor 40 mg  ACE/ARB: Taking--losartan 50 mg      The 10-year  ASCVD risk score (Kizzy HARRISON, et al., 2019) is: 4.5%    Values used to calculate the score:      Age: 49 years      Sex: Female      Is Non- : Yes      Diabetic: Yes      Tobacco smoker: No      Systolic Blood Pressure: 114 mmHg      Is BP treated: Yes      HDL Cholesterol: 42 mg/dL      Total Cholesterol: 146 mg/dL      Screening or Prevention Patient's value Goal Complete/Controlled?   HgA1C Testing and Control   Lab Results   Component Value Date    HGBA1C 6.9 (H) 11/02/2024      Annually/Less than 8% No   Lipid profile : 07/02/2024 Annually Yes   LDL control Lab Results   Component Value Date    LDLCALC 84.8 07/02/2024    Annually/Less than 100 mg/dl  No   Nephropathy screening Lab Results   Component Value Date    LABMICR 15.0 07/02/2024     Lab Results   Component Value Date    PROTEINUA Negative 10/05/2022    Annually No   Blood pressure BP Readings from Last 1 Encounters:   11/11/24 114/72    Less than 140/90 Yes   Dilated retinal exam : 06/20/2024 Annually No   Foot exam   : 11/11/2024 Annually No       CURRENT A1C:    Hemoglobin A1C   Date Value Ref Range Status   11/02/2024 6.9 (H) 4.5 - 6.2 % Final     Comment:     ADA Screening Guidelines:  5.7-6.4%  Consistent with prediabetes  >or=6.5%  Consistent with diabetes    High levels of fetal hemoglobin interfere with the HbA1C  assay. Heterozygous hemoglobin variants (HbS, HgC, etc)do  not significantly interfere with this assay.   However, presence of multiple variants may affect accuracy.     07/02/2024 7.0 (H) 4.0 - 5.6 % Final     Comment:     ADA Screening Guidelines:  5.7-6.4%  Consistent with prediabetes  >or=6.5%  Consistent with diabetes    High levels of fetal hemoglobin interfere with the HbA1C  assay. Heterozygous hemoglobin variants (HbS, HgC, etc)do  not significantly interfere with this assay.   However, presence of multiple variants may affect accuracy.     03/01/2024 6.9 (H) 4.0 - 5.6 % Final     Comment:     ADA  Screening Guidelines:  5.7-6.4%  Consistent with prediabetes  >or=6.5%  Consistent with diabetes    High levels of fetal hemoglobin interfere with the HbA1C  assay. Heterozygous hemoglobin variants (HbS, HgC, etc)do  not significantly interfere with this assay.   However, presence of multiple variants may affect accuracy.         GOAL A1C: 6.5% without hypoglycemia       DM MEDICATIONS USED IN THE PAST: Lantus, Novolog   Metformin -- wasn't working   dexcom   Ozempic -- nausea   Jardiance yeast infection   Omnipod 5   Mounjaro   Fiasp         CURRENT DIABETES MEDICATIONS   Insulin Pump:   Continue Omnipod 5 with dexcom with  Fiasp      Insulin pump settings: continue current doses   Basal: 0.65 units/hr --   ICR: 1:10-- set doses -- 6 units with meals   ISF:  MN-9AM: 1:50  9AM-MN 1:45  Target: 120  IOB: 3 hours --    Pump site change: q 3 days   Cartridge change: q 3 days  Insulin TDD: 23.9 units    Basal 69%   Bolus 31 %   Using the bolus wizard: 1.5 bolues per day   Overridin%    98% in auto mode     Back up Lantus/Levemir: back up Lantus     Patient's pump was downloaded in clinic today and reviewed with patient.   Please see attached documents for pump download.         BLOOD GLUCOSE MONITORING:   Sensor type: Dexcom G6   Average BG readin  Time in range: 57%    Estimated A1c is 7.6%   36% high and 7% debbie high   0% low and 0% very low   Site change:  q10 days    Two weeks prior the average was 177  in target range 54 % of the time with an estimated A1c of 7.5%      Supplies from pharmacy now-- sending to [facility] to get them regularly         Sensor was downloaded in clinic today and reviewed with patient.   Please see attached document for download.       HYPOGLYCEMIA:   0% low 0% very low   0% low - 2 weeks prior   Glucagon kit: Baqsimi    Medic alert bracelet: denies         MEALS: eating 3 meals per day   BF: cup of coffee, oatmeal with waffle or wheat bread with egg- or Yann [employer]    Lunch: job pays for lunch--- fast food- or meat with veggie - or Burger and No fries   Dinner: veggie with meat -- rarely does rice -- spinach, [place] cut green beans or broccoli   Snack: rarely   Drinks: --SF beverages   she stopped the juices and sugary beverages   Water makes her own tea with sugar substitute          CURRENT EXERCISE:  PT completed   None       Review of Systems  Review of Systems   Constitutional:  Negative for appetite change, fatigue and unexpected weight change.   HENT:  Negative for trouble swallowing.    Eyes:  Negative for visual disturbance.   Respiratory:  Negative for shortness of breath.    Cardiovascular:  Negative for chest pain.   Gastrointestinal:  Negative for nausea.   Endocrine: Negative for polydipsia, polyphagia and polyuria.   Genitourinary:         No Nocturia    Musculoskeletal:  Positive for arthralgias (left knee- s/p second surgery and right elbow). Negative for back pain.   Skin:  Negative for wound.   Neurological:  Positive for numbness.     Physical Exam   Physical Exam  Vitals and nursing note reviewed.   Constitutional:       Appearance: She is well-developed. She is obese.   HENT:      Head: Normocephalic and atraumatic.      Right Ear: External ear normal.      Left Ear: External ear normal.      Nose: Nose normal.   Neck:      Thyroid: No thyromegaly.      Trachea: No tracheal deviation.   Cardiovascular:      Rate and Rhythm: Normal rate and regular rhythm.      Heart sounds: No murmur heard.  Pulmonary:      Effort: Pulmonary effort is normal. No respiratory distress.      Breath sounds: Normal breath sounds.   Abdominal:      Palpations: Abdomen is soft.      Tenderness: There is no abdominal tenderness.      Hernia: No hernia is present.   Musculoskeletal:      Cervical back: Normal range of motion and neck supple.   Skin:     General: Skin is warm and dry.      Capillary Refill: Capillary refill takes less than 2 seconds.      Findings: No rash.       Comments: POD sites and dexcom sites are normal appearing. No lipo hypertropthy or atrophy     Neurological:      Mental Status: She is alert and oriented to person, place, and time.      Cranial Nerves: No cranial nerve deficit.   Psychiatric:         Behavior: Behavior normal.         Judgment: Judgment normal.         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   She did not like mounjaro and does not want to revisit   She likes the Fiasp         -- Medication Changes:   Continue Omnipod 5 with dexcom with Fiasp insulin     Insulin pump settings: continue current pump settings   Basal: 0.7 units/hr -- increase basal based on download  Added in a steroid basal rate--0.95 units/hr     ICR: 1:10-- set doses -- 6-7 units with meals ---while on the steroids increase to 10 units with meals  ISF:  MN-9AM: 1:50-  9AM-MN 1:45  Target: 120  IOB: 3 hours     -- Reviewed goals of therapy are to get the best control we can without hypoglycemia.  -- Reviewed patient's current insulin regimen.  Clarified proper insulin dose and timing in relation to meals, etc. Insulin injection sites and proper rotation instructed.    -- Advised frequent self blood glucose monitoring.  Patient encouraged to document glucose results and bring them to every clinic visit.  Upgrade Dexcom to the Dexcom G7---Rx sent to pharmacy  -- Hypoglycemia precautions discussed. Instructed on precautions before driving.    -- Call for Bg repeatedly < 70 or > 180.   -- Close adherence to lifestyle changes recommended.   -- Periodic follow ups for eye evaluations, foot care and dental care suggested.  ---follow-up as needed with diabetes education for Dexcom G7 start with the Omnipod 5         Patient has diabetes mellitus and manages diabetes with intensive insulin regimen and uses prandial and basal insulin daily-- omnipod 5   Patient requires a therapeutic CGM and is willing to use therapeutic CGM for the necessary frequent adjustments of insulin therapy.  I have completed an in-person visit during the previous 6 months and will continue to have in-person visits every 6 months to assess adherence to their CGM regimen and diabetes treatment plan.  Due to COVID pandemic and need for remote monitoring this tool is medically necessary            Type 2 diabetes mellitus with diabetic polyneuropathy, with long-term current use of insulin  Optimize BG readings.   See above.   Following with podiatry     Educated patient to check feet daily for any foreign objects and/or wounds. Discussed with patient the importance of wearing appropriate footwear at all times, not to walk barefoot ever, and to check shoes before putting them on feet. Instructed patient to keep feet dry by regularly changing shoes and socks and drying feet after baths and exercises. Also, instructed patient to report any new lesions, discolorations, or swelling to a healthcare professional.        Primary hypertension  BP goal is < 140/90.   Tolerating ARB  Controlled   Blood  pressure goals discussed with patient      Pure hypercholesterolemia  On statin per ADA recommendations  LDL goal < 100. LDL at goal. LFTs WNL. Continue statin.         Overweight (BMI 25.0-29.9)  Body mass index is 29.55 kg/m².  Increases insulin resistance.   Discussed DM diet and exercise.       History of thyroidectomy, total  On LT4   Reports cysts that were removed were  Benign  Evaluated by General Endocrine in August of 2022-- Dr. NAHUN Santos     Postoperative hypothyroidism  TSH WNL, on LT4 150 mcg, reinforced take by itself on empty stomach, first thing in the morning and wait at least 30-60 minutes to eat, drink, or take other medications.      Seen by General Endocrine in August of 2022-- Dr. Kumar       Insulin pump fitting or adjustment  See above     Insulin pump in place  See above           I spent a total of 30  minutes on the day of the visit.This includes face to face time and non-face to face time preparing to see the patient (eg, review of tests), obtaining and/or reviewing separately obtained history, documenting clinical information in the electronic or other health record, independently interpreting results and communicating results to the patient/family/caregiver, or care coordinator.          Pump backup plan    If the insulin pump is non functional and discontinued for anticipated more than 20 hours, please give daily injections of:   Long acting insulin Lantus 16 units daily   Short acting insulin Fiasp 6 units  for meals according to carb ratios and sensitivity factor in the pump.     When the insulin pump is restarted, do not restart basal rates until at least 22 hours after the last long acting insulin injection. You can set a 0% temporary basal setting that will last until this time and use your pump to bolus for meals and correction.     For any technical insulin pump issues, please contact the insulin pump company; the toll free number is printed on the label on the back of the  insulin pump.       If your sugar is running high for a few hours and does not respond to two correction doses from the insulin pump, it may mean that you have a bad pump site and the site should be changed         Follow up in about 4 months (around 3/11/2025).  Follow up with me in 4 months with labs prior         Orders Placed This Encounter   Procedures    Hemoglobin A1C     Standing Status:   Future     Standing Expiration Date:   5/11/2026    Basic Metabolic Panel     Standing Status:   Future     Standing Expiration Date:   5/11/2026    TSH     Standing Status:   Future     Standing Expiration Date:   5/11/2026       Recommendations were discussed with the patient in detail  The patient verbalized understanding and agrees with the plan outlined as above.     This note was partly generated with [employer] voice recognition software. I apologize for any possible typographical errors.

## 2024-11-11 NOTE — ASSESSMENT & PLAN NOTE
Body mass index is 29.55 kg/m².  Increases insulin resistance.   Discussed DM diet and exercise.

## 2024-11-11 NOTE — ASSESSMENT & PLAN NOTE
A1c has decreased to 6.9%    Readings are running high on pump and Dexcom download but she recently received a steroid pack--right now her blood sugars are high from the steroids  + TOM   c-peptide level WNL X2 now   Can treat as T2DM   Dexcom is helping with self awareness and self accountability  Unable to tolerate Ozempic due to extreme nausea  Unable to tolerate Jardiance due to yeast infections.   She did not like mounjaro and does not want to revisit   She likes the Fiasp         -- Medication Changes:   Continue Omnipod 5 with dexcom with Fiasp insulin     Insulin pump settings: continue current pump settings   Basal: 0.7 units/hr -- increase basal based on download  Added in a steroid basal rate--0.95 units/hr     ICR: 1:10-- set doses -- 6-7 units with meals ---while on the steroids increase to 10 units with meals  ISF:  MN-9AM: 1:50-  9AM-MN 1:45  Target: 120  IOB: 3 hours     -- Reviewed goals of therapy are to get the best control we can without hypoglycemia.  -- Reviewed patient's current insulin regimen. Clarified proper insulin dose and timing in relation to meals, etc. Insulin injection sites and proper rotation instructed.    -- Advised frequent self blood glucose monitoring.  Patient encouraged to document glucose results and bring them to every clinic visit.  Upgrade Dexcom to the Dexcom G7---Rx sent to pharmacy  -- Hypoglycemia precautions discussed. Instructed on precautions before driving.    -- Call for Bg repeatedly < 70 or > 180.   -- Close adherence to lifestyle changes recommended.   -- Periodic follow ups for eye evaluations, foot care and dental care suggested.  ---follow-up as needed with diabetes education for Dexcom G7 start with the Omnipod 5         Patient has diabetes mellitus and manages diabetes with intensive insulin regimen and uses prandial and basal insulin daily-- omnipod 5   Patient requires a therapeutic CGM and is willing to use therapeutic CGM for the necessary frequent  adjustments of insulin therapy.  I have completed an in-person visit during the previous 6 months and will continue to have in-person visits every 6 months to assess adherence to their CGM regimen and diabetes treatment plan.  Due to COVID pandemic and need for remote monitoring this tool is medically necessary

## 2024-12-30 PROBLEM — M25.60 DECREASED RANGE OF MOTION: Status: RESOLVED | Noted: 2024-04-24 | Resolved: 2024-12-30

## 2025-01-10 ENCOUNTER — PATIENT MESSAGE (OUTPATIENT)
Dept: DIABETES | Facility: CLINIC | Age: 50
End: 2025-01-10
Payer: MEDICAID

## 2025-01-14 ENCOUNTER — CLINICAL SUPPORT (OUTPATIENT)
Dept: DIABETES | Facility: CLINIC | Age: 50
End: 2025-01-14
Payer: MEDICAID

## 2025-01-14 DIAGNOSIS — E11.65 UNCONTROLLED DIABETES MELLITUS WITH HYPERGLYCEMIA, WITH LONG-TERM CURRENT USE OF INSULIN: Primary | ICD-10-CM

## 2025-01-14 DIAGNOSIS — Z79.4 UNCONTROLLED DIABETES MELLITUS WITH HYPERGLYCEMIA, WITH LONG-TERM CURRENT USE OF INSULIN: Primary | ICD-10-CM

## 2025-03-08 ENCOUNTER — LAB VISIT (OUTPATIENT)
Dept: LAB | Facility: HOSPITAL | Age: 50
End: 2025-03-08
Attending: NURSE PRACTITIONER
Payer: MEDICAID

## 2025-03-08 DIAGNOSIS — E89.0 POSTOPERATIVE HYPOTHYROIDISM: ICD-10-CM

## 2025-03-08 DIAGNOSIS — E13.9 LADA (LATENT AUTOIMMUNE DIABETES MELLITUS IN ADULTS): ICD-10-CM

## 2025-03-08 DIAGNOSIS — Z79.4 TYPE 2 DIABETES MELLITUS WITH DIABETIC POLYNEUROPATHY, WITH LONG-TERM CURRENT USE OF INSULIN: ICD-10-CM

## 2025-03-08 DIAGNOSIS — E11.42 TYPE 2 DIABETES MELLITUS WITH DIABETIC POLYNEUROPATHY, WITH LONG-TERM CURRENT USE OF INSULIN: ICD-10-CM

## 2025-03-08 LAB
ANION GAP SERPL CALC-SCNC: 9 MMOL/L (ref 8–16)
BUN SERPL-MCNC: 14 MG/DL (ref 6–20)
CALCIUM SERPL-MCNC: 9.3 MG/DL (ref 8.7–10.5)
CHLORIDE SERPL-SCNC: 102 MMOL/L (ref 95–110)
CO2 SERPL-SCNC: 27 MMOL/L (ref 23–29)
CREAT SERPL-MCNC: 0.9 MG/DL (ref 0.5–1.4)
EST. GFR  (NO RACE VARIABLE): >60 ML/MIN/1.73 M^2
ESTIMATED AVG GLUCOSE: 154 MG/DL (ref 68–131)
GLUCOSE SERPL-MCNC: 127 MG/DL (ref 70–110)
HBA1C MFR BLD: 7 % (ref 4–5.6)
POTASSIUM SERPL-SCNC: 3.8 MMOL/L (ref 3.5–5.1)
SODIUM SERPL-SCNC: 138 MMOL/L (ref 136–145)
TSH SERPL DL<=0.005 MIU/L-ACNC: 2.25 UIU/ML (ref 0.4–4)

## 2025-03-08 PROCEDURE — 83036 HEMOGLOBIN GLYCOSYLATED A1C: CPT | Performed by: NURSE PRACTITIONER

## 2025-03-08 PROCEDURE — 36415 COLL VENOUS BLD VENIPUNCTURE: CPT | Mod: PO | Performed by: NURSE PRACTITIONER

## 2025-03-08 PROCEDURE — 80048 BASIC METABOLIC PNL TOTAL CA: CPT | Performed by: NURSE PRACTITIONER

## 2025-03-08 PROCEDURE — 84443 ASSAY THYROID STIM HORMONE: CPT | Performed by: NURSE PRACTITIONER

## 2025-03-09 ENCOUNTER — PATIENT MESSAGE (OUTPATIENT)
Dept: INTERNAL MEDICINE | Facility: CLINIC | Age: 50
End: 2025-03-09
Payer: MEDICAID

## 2025-03-12 ENCOUNTER — TELEPHONE (OUTPATIENT)
Dept: DIABETES | Facility: CLINIC | Age: 50
End: 2025-03-12
Payer: MEDICAID

## 2025-03-13 ENCOUNTER — OFFICE VISIT (OUTPATIENT)
Dept: DIABETES | Facility: CLINIC | Age: 50
End: 2025-03-13
Payer: MEDICAID

## 2025-03-13 VITALS
HEART RATE: 88 BPM | OXYGEN SATURATION: 97 % | DIASTOLIC BLOOD PRESSURE: 81 MMHG | HEIGHT: 72 IN | SYSTOLIC BLOOD PRESSURE: 108 MMHG | BODY MASS INDEX: 30.93 KG/M2 | WEIGHT: 228.38 LBS

## 2025-03-13 DIAGNOSIS — E13.9 LADA (LATENT AUTOIMMUNE DIABETES MELLITUS IN ADULTS): Primary | ICD-10-CM

## 2025-03-13 DIAGNOSIS — Z91.199 NONCOMPLIANCE WITH DIABETES TREATMENT: ICD-10-CM

## 2025-03-13 DIAGNOSIS — Z96.41 INSULIN PUMP IN PLACE: ICD-10-CM

## 2025-03-13 DIAGNOSIS — E66.811 CLASS 1 OBESITY DUE TO EXCESS CALORIES WITH SERIOUS COMORBIDITY AND BODY MASS INDEX (BMI) OF 30.0 TO 30.9 IN ADULT: ICD-10-CM

## 2025-03-13 DIAGNOSIS — E11.65 TYPE 2 DIABETES MELLITUS WITH HYPERGLYCEMIA, WITH LONG-TERM CURRENT USE OF INSULIN: ICD-10-CM

## 2025-03-13 DIAGNOSIS — Z79.4 TYPE 2 DIABETES MELLITUS WITH DIABETIC POLYNEUROPATHY, WITH LONG-TERM CURRENT USE OF INSULIN: ICD-10-CM

## 2025-03-13 DIAGNOSIS — E78.00 PURE HYPERCHOLESTEROLEMIA: Chronic | ICD-10-CM

## 2025-03-13 DIAGNOSIS — I10 PRIMARY HYPERTENSION: Chronic | ICD-10-CM

## 2025-03-13 DIAGNOSIS — Z79.4 TYPE 2 DIABETES MELLITUS WITH HYPERGLYCEMIA, WITH LONG-TERM CURRENT USE OF INSULIN: ICD-10-CM

## 2025-03-13 DIAGNOSIS — E89.0 HISTORY OF THYROIDECTOMY, TOTAL: ICD-10-CM

## 2025-03-13 DIAGNOSIS — E89.0 POSTOPERATIVE HYPOTHYROIDISM: ICD-10-CM

## 2025-03-13 DIAGNOSIS — E11.42 TYPE 2 DIABETES MELLITUS WITH DIABETIC POLYNEUROPATHY, WITH LONG-TERM CURRENT USE OF INSULIN: ICD-10-CM

## 2025-03-13 DIAGNOSIS — Z71.9 HEALTH EDUCATION/COUNSELING: ICD-10-CM

## 2025-03-13 DIAGNOSIS — Z59.9 FINANCIAL DIFFICULTIES: ICD-10-CM

## 2025-03-13 DIAGNOSIS — E66.09 CLASS 1 OBESITY DUE TO EXCESS CALORIES WITH SERIOUS COMORBIDITY AND BODY MASS INDEX (BMI) OF 30.0 TO 30.9 IN ADULT: ICD-10-CM

## 2025-03-13 PROCEDURE — 99215 OFFICE O/P EST HI 40 MIN: CPT | Mod: PBBFAC,PN | Performed by: NURSE PRACTITIONER

## 2025-03-13 PROCEDURE — 99999 PR PBB SHADOW E&M-EST. PATIENT-LVL V: CPT | Mod: PBBFAC,,, | Performed by: NURSE PRACTITIONER

## 2025-03-13 RX ORDER — INSULIN PMP CART,AUT,G6/7,CNTR
1 EACH SUBCUTANEOUS
Qty: 15 EACH | Refills: 11 | Status: SHIPPED | OUTPATIENT
Start: 2025-03-13

## 2025-03-13 RX ORDER — BLOOD-GLUCOSE SENSOR
1 EACH MISCELLANEOUS
Qty: 3 EACH | Refills: 11 | Status: SHIPPED | OUTPATIENT
Start: 2025-03-13 | End: 2026-03-13

## 2025-03-13 SDOH — SOCIAL DETERMINANTS OF HEALTH (SDOH): PROBLEM RELATED TO HOUSING AND ECONOMIC CIRCUMSTANCES, UNSPECIFIED: Z59.9

## 2025-03-13 NOTE — ASSESSMENT & PLAN NOTE
A1c has decreased to 7%    Blood sugar readings correlate to A1c on her Dexcom and Omnipod download  + TOM   c-peptide level WNL X2 now   Can treat as T2DM   Dexcom is helping with self awareness and self accountability  Unable to tolerate Ozempic due to extreme nausea  Unable to tolerate Jardiance due to yeast infections.   She did not like mounjaro and does not want to revisit   She would like to stay on just pump therapy---not interested in other agents at this time  She likes the Fiasp       She does need to work on bolusing with meals  We reviewed pump optimization at length during the visit as there are some days seen on the download where she does not bolus at all or she is only bolusing once a day  We discussed the importance of staying in automated mode and reviewed how to get back into automated mode if she is put into manual mode  Generally you are put into manual mode with a missed bolus---so I have discouraged her from missing mealtime boluses  We reviewed how to properly give correction with hitting the use sensor button        -- Medication Changes:   Continue Omnipod 5 with dexcom with Fiasp insulin     Insulin pump settings: continue current pump settings   Basal: 0.7 units/hr -  steroid basal rate--0.95 units/hr     ICR: 1:10-- set doses -- 6-7 units with meals ---when on steroids increase to 10 units with meals  ISF:  MN-9AM: 1:50-  9AM-MN 1:45  Target: 120  IOB: 3 hours     -- Reviewed goals of therapy are to get the best control we can without hypoglycemia.  -- Reviewed patient's current insulin regimen. Clarified proper insulin dose and timing in relation to meals, etc. Insulin injection sites and proper rotation instructed.    -- Advised frequent self blood glucose monitoring.  Patient encouraged to document glucose results and bring them to every clinic visit. Continue the Dexcom G7---supplies from  pharmacy  -- Hypoglycemia precautions discussed. Instructed on precautions before driving.     -- Call for Bg repeatedly < 70 or > 180.   -- Close adherence to lifestyle changes recommended.   -- Periodic follow ups for eye evaluations, foot care and dental care suggested.           Patient has diabetes mellitus and manages diabetes with intensive insulin regimen and uses prandial and basal insulin daily-- omnipod 5   Patient requires a therapeutic CGM and is willing to use therapeutic CGM for the necessary frequent adjustments of insulin therapy.  I have completed an in-person visit during the previous 6 months and will continue to have in-person visits every 6 months to assess adherence to their CGM regimen and diabetes treatment plan.  Due to COVID pandemic and need for remote monitoring this tool is medically necessary

## 2025-03-13 NOTE — PROGRESS NOTES
CC:   Chief Complaint   Patient presents with    Diabetes Mellitus       HPI: Javier Enciso is a 50 y.o. female presents for a follow up visit today for the management of T2DM.     She was diagnosed with prediabetes in 2018 and was on Metformin, developed T2DM around 2020. She went to the ER in December 2021 and she was started on insulin therapy.     Family hx of diabetes: mother and father   Hospitalized for diabetes: denies   Insulin therapy: December 2021 4/2022- TOM + 0.04 - slightly positive   4/2022- c-peptide level elevated 5.53  10/2022- repeat c-peptide level  elevated 5.72    No personal or FH of thyroid cancer or personal of pancreatic cancer or pancreatitis.       Hx of thyroidectomy due to cysts- January 2012-- not cancerous. LT4 150 mcg daily   Last TSH WNL --she reports that she is taking her levothyroxine       left knee pain   Seeing a  orthopedic surgeon- Teja to get a revision of her knee surgery that Brittany did   Still needs PT but having insurance issues to getting it approved       Our last visit was in November of 2024  At that visit we continued with the Omnipod 5 with Dexcom with Fiasp insulin   Increased her basal rate based on her download  Added in a steroid basal rate  Continued set doses with meals---instructed to increase to 7 units with meals  When taking steroids increase to 10 units with meals  Upgraded her Dexcom to the Dexcom G7  Her recent A1c is 7% which is up from 6.9%  See attached Dexcom in Omnipod download  Over the last 2 weeks she has only been in automated mode 75% of the time and in manual mode 25% of the time  Reports  sensor issues sometimes communicating to he pump   On download - some days missing boluses -- sometimes the whole day with no boluses   Manual mode when she hasn't bolused                      DIABETES COMPLICATIONS: peripheral neuropathy      Diabetes Management Status    ASA: stopped ASA     Statin: Taking--Crestor 40 mg  ACE/ARB:  Taking--losartan 50 mg      The 10-year ASCVD risk score (Kizzy HARRISON, et al., 2019) is: 3.8%    Values used to calculate the score:      Age: 50 years      Sex: Female      Is Non- : Yes      Diabetic: Yes      Tobacco smoker: No      Systolic Blood Pressure: 108 mmHg      Is BP treated: Yes      HDL Cholesterol: 42 mg/dL      Total Cholesterol: 146 mg/dL      Screening or Prevention Patient's value Goal Complete/Controlled?   HgA1C Testing and Control   Lab Results   Component Value Date    HGBA1C 7.0 (H) 03/08/2025      Annually/Less than 8% No   Lipid profile : 07/02/2024 Annually Yes   LDL control Lab Results   Component Value Date    LDLCALC 84.8 07/02/2024    Annually/Less than 100 mg/dl  No   Nephropathy screening Lab Results   Component Value Date    LABMICR 15.0 07/02/2024     Lab Results   Component Value Date    PROTEINUA Negative 10/05/2022    Annually No   Blood pressure BP Readings from Last 1 Encounters:   03/13/25 108/81    Less than 140/90 Yes   Dilated retinal exam : 06/20/2024 Annually No   Foot exam   : 11/11/2024 Annually No       CURRENT A1C:    Hemoglobin A1C   Date Value Ref Range Status   03/08/2025 7.0 (H) 4.0 - 5.6 % Final     Comment:     ADA Screening Guidelines:  5.7-6.4%  Consistent with prediabetes  >or=6.5%  Consistent with diabetes    High levels of fetal hemoglobin interfere with the HbA1C  assay. Heterozygous hemoglobin variants (HbS, HgC, etc)do  not significantly interfere with this assay.   However, presence of multiple variants may affect accuracy.     11/02/2024 6.9 (H) 4.5 - 6.2 % Final     Comment:     ADA Screening Guidelines:  5.7-6.4%  Consistent with prediabetes  >or=6.5%  Consistent with diabetes    High levels of fetal hemoglobin interfere with the HbA1C  assay. Heterozygous hemoglobin variants (HbS, HgC, etc)do  not significantly interfere with this assay.   However, presence of multiple variants may affect accuracy.     07/02/2024 7.0 (H) 4.0  - 5.6 % Final     Comment:     ADA Screening Guidelines:  5.7-6.4%  Consistent with prediabetes  >or=6.5%  Consistent with diabetes    High levels of fetal hemoglobin interfere with the HbA1C  assay. Heterozygous hemoglobin variants (HbS, HgC, etc)do  not significantly interfere with this assay.   However, presence of multiple variants may affect accuracy.         GOAL A1C: 6.5% without hypoglycemia       DM MEDICATIONS USED IN THE PAST: Lantus, Novolog   Metformin -- wasn't working   dexcom G6  Ozempic -- nausea   Jardiance yeast infection   Omnipod 5   Mounjaro   Fiasp   Dexcom G7         CURRENT DIABETES MEDICATIONS   Insulin Pump:   Continue Omnipod 5 with dexcom with  Fiasp      Basal: 0.7 units/hr -- increase basal based on download  Added in a steroid basal rate--0.95 units/hr      ICR: 1:10-- set doses -- 6-7 units with meals ---while on the steroids increase to 10 units with meals  ISF:  MN-9AM: 1:50-  9AM-MN 1:45  Target: 120  IOB: 3 hours       Pump site change: q 3 days   Cartridge change: q 3 days  Insulin TDD: 18.4 units    Basal 59%   Bolus 41 %   Using the bolus wizard: 1.4 bolues per day   Overridin%    75% in auto mode   25% manual mode     Back up Lantus/Levemir: back up Lantus     Patient's pump was downloaded in clinic today and reviewed with patient.   Please see attached documents for pump download.         BLOOD GLUCOSE MONITORING:   Sensor type: Dexcom G7  Average BG readin  Time in range:74%    Estimated A1c is 7.1%   24% high and 2% debbie high   0% low and 0% very low   Site change:  q10 days    Two weeks prior the average was 157  in target range 74% of the time with an estimated A1c of 7.1%      Supplies from pharmacy now-- sending to [facility] to get them regularly         Sensor was downloaded in clinic today and reviewed with patient.   Please see attached document for download.       HYPOGLYCEMIA:   0% low 0% very low   0% low - 2 weeks prior   Glucagon kit: Baqsimi     Medic alert bracelet: denies         MEALS: eating 3 meals per day   BF: cup of coffee, oatmeal with waffle or wheat bread with egg- or Yann Levi's   Lunch: job pays for lunch--- fast food- or meat with veggie - or Burger and No fries   Dinner: veggie with meat -- rarely does rice -- spinach, [place] cut green beans or broccoli   Snack: rarely   Drinks: --SF beverages   she stopped the juices and sugary beverages   Water makes her own tea with sugar substitute          CURRENT EXERCISE:  PT completed   None         Review of Systems  Review of Systems   Constitutional:  Negative for appetite change, fatigue and unexpected weight change.   HENT:  Negative for trouble swallowing.    Eyes:  Negative for visual disturbance.   Respiratory:  Negative for shortness of breath.    Cardiovascular:  Negative for chest pain.   Gastrointestinal:  Negative for nausea.   Endocrine: Negative for polydipsia, polyphagia and polyuria.   Genitourinary:         No Nocturia    Musculoskeletal:  Positive for arthralgias (left knee- s/p second surgery). Negative for back pain.   Skin:  Negative for wound.   Neurological:  Positive for numbness.     Physical Exam   Physical Exam  Vitals and nursing note reviewed.   Constitutional:       Appearance: She is well-developed. She is obese.   HENT:      Head: Normocephalic and atraumatic.      Right Ear: External ear normal.      Left Ear: External ear normal.      Nose: Nose normal.   Neck:      Thyroid: No thyromegaly.      Trachea: No tracheal deviation.   Cardiovascular:      Rate and Rhythm: Normal rate and regular rhythm.      Heart sounds: No murmur heard.  Pulmonary:      Effort: Pulmonary effort is normal. No respiratory distress.      Breath sounds: Normal breath sounds.   Abdominal:      Palpations: Abdomen is soft.      Tenderness: There is no abdominal tenderness.      Hernia: No hernia is present.   Musculoskeletal:      Cervical back: Normal range of motion and neck supple.    Skin:     General: Skin is warm and dry.      Capillary Refill: Capillary refill takes less than 2 seconds.      Findings: No rash.      Comments: POD sites and dexcom sites are normal appearing. No lipo hypertropthy or atrophy     Neurological:      Mental Status: She is alert and oriented to person, place, and time.      Cranial Nerves: No cranial nerve deficit.   Psychiatric:         Behavior: Behavior normal.         Judgment: Judgment normal.         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   She did not like mounjaro and does not want to revisit   She would like to stay on just pump therapy---not interested in other agents at this time  She likes the Fiasp       She does need to work on bolusing with meals  We reviewed pump optimization at length during the visit as there are some days seen on the download where she does not bolus at all or she is only bolusing once a day  We discussed the importance of staying in automated mode and reviewed how to get back into automated mode if she is put into manual mode  Generally you are put into manual mode with a missed bolus---so I have discouraged her from missing mealtime boluses  We reviewed how to properly give correction with hitting the use sensor button        -- Medication Changes:   Continue Omnipod 5 with dexcom with Fiasp insulin     Insulin pump settings: continue current pump settings   Basal: 0.7 units/hr -  steroid basal rate--0.95 units/hr     ICR: 1:10-- set doses -- 6-7 units  with meals ---when on steroids increase to 10 units with meals  ISF:  MN-9AM: 1:50-  9AM-MN 1:45  Target: 120  IOB: 3 hours     -- Reviewed goals of therapy are to get the best control we can without hypoglycemia.  -- Reviewed patient's current insulin regimen. Clarified proper insulin dose and timing in relation to meals, etc. Insulin injection sites and proper rotation instructed.    -- Advised frequent self blood glucose monitoring.  Patient encouraged to document glucose results and bring them to every clinic visit. Continue the Dexcom G7---supplies from  pharmacy  -- Hypoglycemia precautions discussed. Instructed on precautions before driving.    -- Call for Bg repeatedly < 70 or > 180.   -- Close adherence to lifestyle changes recommended.   -- Periodic follow ups for eye evaluations, foot care and dental care suggested.           Patient has diabetes mellitus and manages diabetes with intensive insulin regimen and uses prandial and basal insulin daily-- omnipod 5   Patient requires a therapeutic CGM and is willing to use therapeutic CGM for the necessary frequent adjustments of insulin therapy.  I have completed an in-person visit during the previous 6 months and will continue to have in-person visits every 6 months to assess adherence to their CGM regimen and diabetes treatment plan.  Due to COVID pandemic and need for remote monitoring this tool is medically necessary            Primary hypertension  BP goal is < 140/90.   Tolerating ARB  Controlled   Blood pressure goals discussed with patient      Pure hypercholesterolemia  On statin per ADA recommendations  LDL goal < 100. LDL at goal. LFTs WNL. Continue statin.         Class 1 obesity due to excess calories with serious comorbidity and body mass index (BMI) of 30.0 to 30.9 in adult  Body mass index is 30.13 kg/m².  Increases insulin resistance.   Discussed DM diet and exercise.       Postoperative hypothyroidism  TSH WNL, on LT4 150 mcg, reinforced take  by itself on empty stomach, first thing in the morning and wait at least 30-60 minutes to eat, drink, or take other medications.      Seen by General Endocrine in August of 2022-- Dr. Kumar       History of thyroidectomy, total  On LT4   Reports cysts that were removed were  Benign  Evaluated by General Endocrine in August of 2022-- Dr. NAHUN Santos     Insulin pump in place  See above             I spent a total of 30  minutes on the day of the visit.This includes face to face time and non-face to face time preparing to see the patient (eg, review of tests), obtaining and/or reviewing separately obtained history, documenting clinical information in the electronic or other health record, independently interpreting results and communicating results to the patient/family/caregiver, or care coordinator.          Pump backup plan    If the insulin pump is non functional and discontinued for anticipated more than 20 hours, please give daily injections of:   Long acting insulin Lantus 16 units daily   Short acting insulin Fiasp 6 units  for meals according to carb ratios and sensitivity factor in the pump.     When the insulin pump is restarted, do not restart basal rates until at least 22 hours after the last long acting insulin injection. You can set a 0% temporary basal setting that will last until this time and use your pump to bolus for meals and correction.     For any technical insulin pump issues, please contact the insulin pump company; the toll free number is printed on the label on the back of the insulin pump.       If your sugar is running high for a few hours and does not respond to two correction doses from the insulin pump, it may mean that you have a bad pump site and the site should be changed           Follow up in about 4 months (around 7/13/2025).  Schedule labs for Saturday-- she needs to do Re Estrella's labs - in [place]   Follow up with me in 4 months with labs prior         Orders Placed This  Encounter   Procedures    Hemoglobin A1C     Standing Status:   Future     Expected Date:   6/13/2025     Expiration Date:   9/13/2026    Basic Metabolic Panel     Standing Status:   Future     Expected Date:   6/13/2025     Expiration Date:   9/13/2026       Recommendations were discussed with the patient in detail  The patient verbalized understanding and agrees with the plan outlined as above.     This note was partly generated with E-Blink voice recognition software. I apologize for any possible typographical errors.

## 2025-03-14 ENCOUNTER — TELEPHONE (OUTPATIENT)
Dept: PODIATRY | Facility: CLINIC | Age: 50
End: 2025-03-14
Payer: MEDICAID

## 2025-03-14 NOTE — TELEPHONE ENCOUNTER
Spoke with patient and scheduled her for March 26, 2025. Verbalized understanding and no further issues discussed.----- Message from Bishop sent at 3/14/2025 11:34 AM CDT -----  Regarding: appointment access  Contact: [phone]  Pt is calling to get scheduled with appointment.Tried to schedule pt appointment there was no appointments available . Please contact pt with appointment . Javier Enciso [phone] contact pt

## 2025-04-01 ENCOUNTER — OFFICE VISIT (OUTPATIENT)
Dept: PODIATRY | Facility: CLINIC | Age: 50
End: 2025-04-01
Payer: MEDICAID

## 2025-04-01 VITALS
SYSTOLIC BLOOD PRESSURE: 119 MMHG | HEIGHT: 72 IN | DIASTOLIC BLOOD PRESSURE: 82 MMHG | HEART RATE: 75 BPM | BODY MASS INDEX: 30.86 KG/M2 | WEIGHT: 227.88 LBS

## 2025-04-01 DIAGNOSIS — G57.61 MORTON'S METATARSALGIA, NEURALGIA, OR NEUROMA, RIGHT: ICD-10-CM

## 2025-04-01 DIAGNOSIS — Z79.4 TYPE 2 DIABETES MELLITUS WITH DIABETIC POLYNEUROPATHY, WITH LONG-TERM CURRENT USE OF INSULIN: Primary | ICD-10-CM

## 2025-04-01 DIAGNOSIS — R52 TINGLING PAIN: ICD-10-CM

## 2025-04-01 DIAGNOSIS — E11.42 TYPE 2 DIABETES MELLITUS WITH DIABETIC POLYNEUROPATHY, WITH LONG-TERM CURRENT USE OF INSULIN: Primary | ICD-10-CM

## 2025-04-01 DIAGNOSIS — G57.62 NEUROMA OF SECOND INTERSPACE OF LEFT FOOT: ICD-10-CM

## 2025-04-01 DIAGNOSIS — R25.2 CRAMPS OF RIGHT LOWER EXTREMITY: ICD-10-CM

## 2025-04-01 PROCEDURE — 3066F NEPHROPATHY DOC TX: CPT | Mod: CPTII,,, | Performed by: PODIATRIST

## 2025-04-01 PROCEDURE — 3008F BODY MASS INDEX DOCD: CPT | Mod: CPTII,,, | Performed by: PODIATRIST

## 2025-04-01 PROCEDURE — 3074F SYST BP LT 130 MM HG: CPT | Mod: CPTII,,, | Performed by: PODIATRIST

## 2025-04-01 PROCEDURE — 3072F LOW RISK FOR RETINOPATHY: CPT | Mod: CPTII,,, | Performed by: PODIATRIST

## 2025-04-01 PROCEDURE — 99999PBSHW PR PBB SHADOW TECHNICAL ONLY FILED TO HB: Mod: JZ,PBBFAC,,

## 2025-04-01 PROCEDURE — 3061F NEG MICROALBUMINURIA REV: CPT | Mod: CPTII,,, | Performed by: PODIATRIST

## 2025-04-01 PROCEDURE — 3051F HG A1C>EQUAL 7.0%<8.0%: CPT | Mod: CPTII,,, | Performed by: PODIATRIST

## 2025-04-01 PROCEDURE — 99999 PR PBB SHADOW E&M-EST. PATIENT-LVL IV: CPT | Mod: PBBFAC,,, | Performed by: PODIATRIST

## 2025-04-01 PROCEDURE — 99214 OFFICE O/P EST MOD 30 MIN: CPT | Mod: PBBFAC,PN,25 | Performed by: PODIATRIST

## 2025-04-01 PROCEDURE — 1159F MED LIST DOCD IN RCRD: CPT | Mod: CPTII,,, | Performed by: PODIATRIST

## 2025-04-01 PROCEDURE — 3079F DIAST BP 80-89 MM HG: CPT | Mod: CPTII,,, | Performed by: PODIATRIST

## 2025-04-01 PROCEDURE — 4010F ACE/ARB THERAPY RXD/TAKEN: CPT | Mod: CPTII,,, | Performed by: PODIATRIST

## 2025-04-01 PROCEDURE — 99214 OFFICE O/P EST MOD 30 MIN: CPT | Mod: 25,S$PBB,, | Performed by: PODIATRIST

## 2025-04-01 PROCEDURE — 64455 NJX AA&/STRD PLTR COM DG NRV: CPT | Mod: PBBFAC,PN | Performed by: PODIATRIST

## 2025-04-01 RX ADMIN — METHYLPREDNISOLONE ACETATE 40 MG: 40 INJECTION, SUSPENSION INTRA-ARTICULAR; INTRALESIONAL; INTRAMUSCULAR; SOFT TISSUE at 08:04

## 2025-04-01 NOTE — PROGRESS NOTES
Subjective:      Patient ID: Javier Enciso is a 50 y.o. female.    Chief Complaint: Foot Pain (Bilateral, also feels a tingling in both feet, also feels pain down left leg to her foot.)    Patient is here c/o foot pain x 3 wks. States has B/L tingling bottom of feet & near tips of toes. ALso, feet feel stuck in arch w/ stabbing to toes when walking, R>L. Pain level up to 7/10. (Ssx appear to be similar to visit last 7/31/2024 for neuroma 3rd IMS R & 2nd IMS L -  advised on padding w/ met.gelstraps as well as offloading w/ met.& arch pads). Patient has not continued w/ recommended tx to prevent recurrence.  Had pain radiating from back down leg as well.  10/23/24  Patient is PO 4th toenail P&A. Residual tenderness pain level 1/10. Some dark discoloration in immediate area.  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  9/4/24  Patient c/o recurrent bleeding w/ trimming of R 4th toenail, as well as pain to direct pressure continuous when nail plate has grown back. Wants to discuss tx options; ultimately will schedule permanent nail plate under local, encouraged per stepmom who accompanies here.  Also has callus (L foot) & nails long - doesn't go regularly to pedicure.  7/31/24  Javier is a 50 y.o. female who presents new to the clinic c/o  painful IGTN on the 4th toe R foot w/ underlying growth of skin x yrs; no h/o known injury. Also has thick & discolored nails B/L that tend to ingrow.  She was told she has diabetic neuropathy - tingling, cramping, stinging & crawling sensation on & off.  Also deals with a lot of pain L knee & associated leg pain; pain level 6/10 -sees ortho.  Normally in flat sandals including today but @ do not get home as well.    Last saw podiatrist about 3 years ago outside [facility].    Current shoe gear; flat sandal.  Has diabetic shoes.    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)    Anxiety    Pure hypercholesterolemia    Migraine without aura and without status migrainosus, not intractable    Gastroesophageal reflux disease without esophagitis    Vitamin D deficiency    Primary hypertension    Class 1 obesity due to excess calories with serious comorbidity and body mass index (BMI) of 30.0 to 30.9 in adult    Postoperative hypothyroidism    History of thyroidectomy, total    Knee joint disorder (narrowing), left    Type 2 diabetes mellitus with hyperglycemia, with long-term current use of insulin    ROBERTH (obstructive sleep apnea)    Insulin pump in place      Hemoglobin A1C   Date Value Ref Range Status   03/08/2025 7.0 (H) 4.0 - 5.6 % Final     Comment:     ADA Screening Guidelines:  5.7-6.4%  Consistent with prediabetes  >or=6.5%  Consistent with diabetes    High levels of fetal hemoglobin interfere with the HbA1C  assay. Heterozygous hemoglobin variants (HbS, HgC, etc)do  not significantly interfere with this assay.   However, presence of multiple variants may affect accuracy.     11/02/2024 6.9 (H) 4.5 - 6.2 % Final     Comment:     ADA Screening Guidelines:  5.7-6.4%  Consistent with prediabetes  >or=6.5%  Consistent with diabetes    High levels of fetal hemoglobin interfere with the HbA1C  assay. Heterozygous hemoglobin variants (HbS, HgC, etc)do  not significantly interfere with this assay.   However, presence of multiple variants may affect accuracy.     07/02/2024 7.0 (H) 4.0 - 5.6 % Final     Comment:     ADA Screening Guidelines:  5.7-6.4%  Consistent with prediabetes  >or=6.5%  Consistent with diabetes    High levels of fetal hemoglobin interfere with the HbA1C  assay. Heterozygous hemoglobin variants  (HbS, HgC, etc)do  not significantly interfere with this assay.   However, presence of multiple variants may affect accuracy.       Objective:      Review of Systems   Constitutional: Negative for malaise/fatigue.   Cardiovascular:  Negative for leg swelling.   Skin:  Positive for color change, dry skin and nail changes. Negative for itching, rash and suspicious lesions.   Musculoskeletal:  Positive for back pain (LBP w/ sciatica LLE; scoliosis.), muscle cramps and myalgias. Negative for falls and muscle weakness.   Neurological:  Positive for paresthesias and sensory change. Negative for focal weakness, numbness and weakness.   Psychiatric/Behavioral:  The patient is nervous/anxious.      Physical Exam  Vitals reviewed.   Constitutional:       General: She is not in acute distress.     Appearance: She is well-developed. She is obese.   Cardiovascular:      Pulses: Normal pulses.           Dorsalis pedis pulses are 2+ on the right side and 2+ on the left side.   Musculoskeletal:         General: Tenderness present. No swelling or signs of injury.      Right lower leg: No edema.      Left lower leg: No edema.      Right foot: Deformity (normal/ high MLA B/L) and bunion present.      Left foot: Deformity (hammertoes B/L) and bunion (mild HAV B/L) present.        Feet:    Feet:      Right foot:      Skin integrity: Dry skin present.      Toenail Condition: Right toenails are ingrown. Fungal disease present.     Left foot:      Skin integrity: Dry skin present.      Toenail Condition: Left toenails are ingrown. Fungal disease present.     Comments: Toenails 1st, 2nd, 3rd, 4th, 5th  B/L are wide & cryptotic w/ distal L>R hallux, 4th & lateral 5th R thickened, dystrophic, discolored, w/ crumbly subungual debris.   Skin:     General: Skin is warm and dry.      Capillary Refill: Capillary refill takes less than 2 seconds.      Findings: No bruising, erythema, lesion or rash.      Comments: IPK medial HIPJ B/L as well as L  sub 5th met.head.    Neurological:      Mental Status: She is alert and oriented to person, place, and time.      Gait: Gait normal.      Comments: Paresthesias including tingling, stabbing, B/L feet plantar to toes, & R foot 'locks' consistent w/ previous Dx of neuroma B/L.    TTP IMS B/L w/ lateral met.head compression & palpable fullness R 3rd> 2nd IMS; - palpable clicking/ - Stephany's sign as compared to prior (>8 months ago).         Psychiatric:         Mood and Affect: Affect normal. Mood is anxious.         Behavior: Behavior normal. Behavior is cooperative.         Assessment:      Encounter Diagnoses   Name Primary?    Type 2 diabetes mellitus with diabetic polyneuropathy, with long-term current use of insulin Yes    Tingling pain     Cramps of right lower extremity     Valdez's metatarsalgia, neuralgia, or neuroma, right     Neuroma of second interspace of left foot        Problem List Items Addressed This Visit    None  Visit Diagnoses         Type 2 diabetes mellitus with diabetic polyneuropathy, with long-term current use of insulin    -  Primary      Tingling pain          Cramps of right lower extremity          Valdez's metatarsalgia, neuralgia, or neuroma, right        Relevant Orders    Neuroma injection R 3rd IMS      Neuroma of second interspace of left foot               Plan:       Javier was seen today for foot pain.    Diagnoses and all orders for this visit:    Type 2 diabetes mellitus with diabetic polyneuropathy, with long-term current use of insulin    Tingling pain    Cramps of right lower extremity    Valdez's metatarsalgia, neuralgia, or neuroma, right  -     Neuroma injection R 3rd IMS    Neuroma of second interspace of left foot    I counseled the patient on her conditions, their implications & medical mgmt.    - Shoe inspection. Diabetic Foot Education. Patient reminded of the importance of good blood sugar control to help prevent podiatric complications of diabetes. Patient  instructed on proper foot hygeine. We discussed wearing proper shoe gear, daily foot inspections, never walking w/out protective shoe gear, annual DM foot exam, sooner prn.       -Discussed shoe choice.   The importance of wearing shoes w/ adequate room in toe box to accommodate deformities were discussed.   Recommended shoes w/ adequate arch supports to alleviate abnormal pressure & improve stability of foot while walking.   Avoid flat shoes or barefoot walking as these will exacerbate or worsen symptoms.  .   - Shoe inspection. Patient instructed on proper supportive shoe gear all times WB including @ home.  Discussed non-prescription inserts vs thin &/ thick met.gelstraps.  -To bring in shoes for offloading PPT met.pads when ssx resolved.  Discussed antiinflammatories including topical & PO NSAIDs, PO or injectable steroid.  -Patient may start topical Voltaren gel up to qid prn.  -Injection R 3rd  IMS w/ LA/ steroid.    Patient was amenable to the above recommendations, all questions asked were answered, left my office fully informed & will f/u 4 wks., sooner PRN.          A total of 27 mins.was spent on chart review, patient visit & documentation.

## 2025-04-01 NOTE — LETTER
April 1, 2025      [facility]  [address]  [place] [address]  Phone: [phone]  Fax: [phone]       Patient: Javier Enciso   YOB: 1975  Date of Visit: 04/01/2025    To Whom It May Concern:    Doug Enciso  was at [facility] on 04/01/2025. The patient may return to work/school on 4/2/2025 with restrictions. Patient can not stand more than 20 hours a week. Patient will be evaluated again  on 5/1/2025 at her next visit. If you have any questions or concerns, or if I can be of further assistance, please do not hesitate to contact me.    Sincerely,    Haritha Handley MA

## 2025-04-02 ENCOUNTER — PATIENT MESSAGE (OUTPATIENT)
Dept: DIABETES | Facility: CLINIC | Age: 50
End: 2025-04-02
Payer: MEDICAID

## 2025-04-02 NOTE — TELEPHONE ENCOUNTER
Calling patient per request     High because of steroids.     Discussed increasing to 10 units while getting the steroids and the sugars are high   Hit the use CGM button.     She v/u   All questions answered     BG is 367 right now-   Instructed to give correction   Lower carb meal   Increase water

## 2025-04-08 RX ORDER — METHYLPREDNISOLONE ACETATE 40 MG/ML
40 INJECTION, SUSPENSION INTRA-ARTICULAR; INTRALESIONAL; INTRAMUSCULAR; SOFT TISSUE
Status: DISCONTINUED | OUTPATIENT
Start: 2025-04-01 | End: 2025-04-08 | Stop reason: HOSPADM

## 2025-04-09 NOTE — PROCEDURES
Neuroma injection R 3rd [facility]    Date/Time: 4/1/2025 8:45 AM    Performed by: Shell Edwards DPM  Authorized by: Shell Edwards DPM    Consent Done?:  Yes (Verbal)  Indications:  Pain  Local anesthesia used?: Yes   Anesthesia:  Local infiltration  Local anesthetic:  Bupivacaine 0.5% without epinephrine, lidocaine 2% without epinephrine and topical anesthetic  Anesthetic total (ml):  1    Location Right Foot:  Third Webspace  Needle size:  25 G  Approach:  Dorsal  Medications:  40 mg methylPREDNISolone acetate 40 mg/mL  Patient tolerance:  Patient tolerated the procedure well with no immediate complications   & Dexamethasone Phosphate 4mg/ ml.

## 2025-04-27 NOTE — PROGRESS NOTES
Subjective:      Patient ID: Javier Enciso is a 50 y.o. female.    Chief Complaint: Foot Pain    Patient is here for foot pain.  States the left ankle appears to give away a lot.  Also, still having pain to the ball of the foot; pain level up to 6/10.  No longer having the arch pain though.  4/1/25  Patient is here c/o foot pain x 3 wks. States has B/L tingling bottom of feet & near tips of toes. ALso, feet feel stuck in arch w/ stabbing to toes when walking, R>L. Pain level up to 7/10. (Ssx appear to be similar to visit last 7/31/2024 for neuroma 3rd IMS R & 2nd IMS L -  advised on padding w/ met.gelstraps as well as offloading w/ met.& arch pads). Patient has not continued w/ recommended tx to prevent recurrence.  Had pain radiating from back down leg as well.  10/23/24  Patient is PO 4th toenail P&A. Residual tenderness pain level 1/10. Some dark discoloration in immediate area.  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  9/4/24  Patient c/o recurrent bleeding w/ trimming of R 4th toenail, as well as pain to direct pressure continuous when nail plate has grown back. Wants to discuss tx options; ultimately will schedule permanent nail plate under local, encouraged per stepmom who accompanies here.  Also has callus (L foot) & nails long - doesn't go regularly to pedicure.  7/31/24  Javier is a 50 y.o. female who presents new to the clinic c/o  painful IGTN on the 4th toe R foot w/ underlying growth of skin x yrs; no h/o known injury. Also has thick & discolored nails B/L that tend to ingrow.  She was told she has diabetic neuropathy - tingling, cramping, stinging & crawling sensation on & off.  Also deals with a lot of pain L knee & associated leg pain; pain level 6/10 -sees ortho.  Normally in flat sandals including today but @ do not get home as well.    Last saw podiatrist about 3 years ago outside [facility].    Current shoe gear; flat sandal.  Has  diabetic shoes.    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)    Anxiety    Pure hypercholesterolemia    Migraine without aura and without status migrainosus, not intractable    Gastroesophageal reflux disease without esophagitis    Vitamin D deficiency    Primary hypertension    Class 1 obesity due to excess calories with serious comorbidity and body mass index (BMI) of 30.0 to 30.9 in adult    Postoperative hypothyroidism    History of thyroidectomy, total    Knee joint disorder (narrowing), left    Type 2 diabetes mellitus with hyperglycemia, with long-term current use of insulin    ROBERTH (obstructive sleep apnea)    Insulin pump in place      Hemoglobin A1C   Date Value Ref Range Status   03/08/2025 7.0 (H) 4.0 - 5.6 % Final     Comment:     ADA Screening Guidelines:  5.7-6.4%  Consistent with prediabetes  >or=6.5%  Consistent with diabetes    High levels of fetal hemoglobin interfere with the HbA1C  assay. Heterozygous hemoglobin variants (HbS, HgC, etc)do  not significantly interfere with this assay.   However, presence of multiple variants may affect accuracy.     11/02/2024 6.9 (H) 4.5 - 6.2 % Final     Comment:     ADA Screening Guidelines:  5.7-6.4%  Consistent with prediabetes  >or=6.5%  Consistent with diabetes    High levels of fetal hemoglobin interfere with the HbA1C  assay. Heterozygous hemoglobin variants (HbS, HgC, etc)do  not significantly interfere with this assay.   However, presence of multiple variants may affect accuracy.     07/02/2024 7.0 (H) 4.0 - 5.6 % Final     Comment:     ADA Screening Guidelines:  5.7-6.4%  Consistent with prediabetes  >or=6.5%  Consistent with  diabetes    High levels of fetal hemoglobin interfere with the HbA1C  assay. Heterozygous hemoglobin variants (HbS, HgC, etc)do  not significantly interfere with this assay.   However, presence of multiple variants may affect accuracy.       Hemoglobin A1c   Date Value Ref Range Status   05/01/2025 7.5 (H) 4.0 - 5.6 % Final     Comment:     ADA Screening Guidelines:  5.7-6.4%  Consistent with prediabetes  >=6.5%  Consistent with diabetes    High levels of fetal hemoglobin interfere with the HbA1C  assay. Heterozygous hemoglobin variants (HbS, HgC, etc)do  not significantly interfere with this assay.   However, presence of multiple variants may affect accuracy.     Objective:      Review of Systems   Constitutional: Negative for malaise/fatigue.   Cardiovascular:  Negative for leg swelling.   Skin:  Positive for color change, dry skin and nail changes. Negative for itching, rash and suspicious lesions.   Musculoskeletal:  Positive for back pain (LBP w/ sciatica LLE; scoliosis.), muscle cramps and myalgias. Negative for falls and muscle weakness.   Neurological:  Positive for paresthesias and sensory change. Negative for focal weakness, numbness and weakness.   Psychiatric/Behavioral:  The patient is nervous/anxious.      Physical Exam  Vitals reviewed.   Constitutional:       General: She is not in acute distress.     Appearance: She is well-developed and overweight.   Cardiovascular:      Pulses: Normal pulses.           Dorsalis pedis pulses are 2+ on the right side and 2+ on the left side.   Musculoskeletal:         General: Tenderness present. No swelling or signs of injury.      Right lower leg: No edema.      Left lower leg: No edema.      Right foot: Deformity (normal/ high MLA B/L) and bunion present.      Left foot: Deformity (hammertoes B/L) and bunion (mild HAV B/L) present.        Feet:    Feet:      Right foot:      Skin integrity: Dry skin present.      Toenail Condition: Right toenails are ingrown.  Fungal disease present.     Left foot:      Skin integrity: Dry skin present.      Toenail Condition: Left toenails are ingrown. Fungal disease present.     Comments: Toenails 1st, 2nd, 3rd, 4th, 5th  B/L are wide & cryptotic w/ distal L>R hallux, 4th & lateral 5th R thickened, dystrophic, discolored, w/ crumbly subungual debris.     MS strength of extrinsics to foot & ankle B/L + 5/5 in DF/PF/Inv/Ev to resistance w/ no reproduction of pain in any direction.     Passive ROM of ankle & pedal joints is painless & w/out crepitation B/L.     Skin:     General: Skin is warm and dry.      Capillary Refill: Capillary refill takes less than 2 seconds.      Findings: No bruising or erythema.      Comments: IPK medial HIPJ B/L as well as L sub 5th met.head.    Neurological:      Mental Status: She is alert and oriented to person, place, and time.      Motor: No weakness.      Gait: Gait normal.      Comments: Paresthesias including tingling, stabbing, B/L feet plantar to toes, & R foot 'locks' consistent w/ previous Dx of neuroma B/L.    TTP IMS B/L w/ lateral met.head compression & palpable fullness R 3rd> B/L 2nd IMS; - palpable clicking/ - Stephany's sign as compared to prior (>8 months ago).         Psychiatric:         Mood and Affect: Affect normal. Mood is anxious.         Behavior: Behavior normal. Behavior is cooperative.       X-Ray Foot Complete 3 view Bilateral  Order: [id number]   Status: Final result       Next appt: 06/19/2025 at 08:45 AM in Podiatry (Shell Edwards DPM)       Dx: Type 2 diabetes mellitus without comp...    Test Result Released: Yes (seen)    0 Result Notes  Details    Reading Physician Reading Date Result Priority   Gurjit Cooper MD  [phone]  11/8/2023 Routine     Narrative & Impression  EXAMINATION:  XR FOOT COMPLETE 3 VIEW BILATERAL     CLINICAL HISTORY:  Type 2 diabetes mellitus without complications     TECHNIQUE:  AP, lateral, and oblique views of both feet were performed.      COMPARISON:  None     FINDINGS:  Mild hallux valgus bilaterally.  No fracture or dislocation.  No bone destruction identified     Impression:     See above        Electronically signed by:Gurjit Cooper MD  Date:                                            11/08/2023  Time:                                           11:59   I independently reviewed the x-ray images.    Assessment:      Encounter Diagnoses   Name Primary?    Pain of joint of left ankle and foot Yes    Neuroma of second interspace of left foot     Valdez's metatarsalgia, neuralgia, or neuroma, right     Metatarsalgia of both feet     Type 2 diabetes mellitus with diabetic polyneuropathy, with long-term current use of insulin     Neuroma of second interspace of right foot        Problem List Items Addressed This Visit    None  Visit Diagnoses         Pain of joint of left ankle and foot    -  Primary      Neuroma of second interspace of left foot          Valdez's metatarsalgia, neuralgia, or neuroma, right          Metatarsalgia of both feet          Type 2 diabetes mellitus with diabetic polyneuropathy, with long-term current use of insulin          Neuroma of second interspace of right foot               Plan:       Javier was seen today for foot pain.    Diagnoses and all orders for this visit:    Pain of joint of left ankle and foot    Neuroma of second interspace of left foot    Valdez's metatarsalgia, neuralgia, or neuroma, right    Metatarsalgia of both feet    Type 2 diabetes mellitus with diabetic polyneuropathy, with long-term current use of insulin    Neuroma of second interspace of right foot    I counseled the patient on her conditions, their implications & medical mgmt.    - Shoe inspection. Diabetic Foot Education. Patient reminded of the importance of good blood sugar control to help prevent podiatric complications of diabetes. Patient instructed on proper foot hygeine. We discussed wearing proper shoe gear, daily foot inspections, never  walking w/out protective shoe gear, annual DM foot exam, sooner prn.       -Discussed shoe choice again.   The importance of wearing shoes w/ adequate room in toe box to accommodate deformities were discussed.   Recommended shoes w/ adequate arch supports to alleviate abnormal pressure & improve stability of foot while walking.   Avoid flat shoes or barefoot walking as these will exacerbate or worsen symptoms.  .   - Shoe inspection. Patient instructed on proper supportive shoe gear all times WB including @ home.  Continue thin &/ thick met.gelstraps, dependent on shoe.  - PPT met.pads & bar B/L, arch R & met.pad L arch added to current shoes.  Discussed antiinflammatories including topical & PO NSAIDs, PO or injectable steroid.  -use topical Voltaren gel up to qid prn.    Patient  will f/u 4 wks., sooner PRN.          A total of 33 mins.was spent on chart review, patient visit & documentation.

## 2025-05-01 ENCOUNTER — OFFICE VISIT (OUTPATIENT)
Dept: PODIATRY | Facility: CLINIC | Age: 50
End: 2025-05-01
Payer: MEDICAID

## 2025-05-01 VITALS
SYSTOLIC BLOOD PRESSURE: 114 MMHG | HEIGHT: 72 IN | WEIGHT: 227.19 LBS | HEART RATE: 83 BPM | BODY MASS INDEX: 30.77 KG/M2 | DIASTOLIC BLOOD PRESSURE: 77 MMHG

## 2025-05-01 DIAGNOSIS — Z79.4 TYPE 2 DIABETES MELLITUS WITH DIABETIC POLYNEUROPATHY, WITH LONG-TERM CURRENT USE OF INSULIN: ICD-10-CM

## 2025-05-01 DIAGNOSIS — M77.41 METATARSALGIA OF BOTH FEET: ICD-10-CM

## 2025-05-01 DIAGNOSIS — M25.572 PAIN OF JOINT OF LEFT ANKLE AND FOOT: Primary | ICD-10-CM

## 2025-05-01 DIAGNOSIS — G57.62 NEUROMA OF SECOND INTERSPACE OF LEFT FOOT: ICD-10-CM

## 2025-05-01 DIAGNOSIS — G57.61 NEUROMA OF SECOND INTERSPACE OF RIGHT FOOT: ICD-10-CM

## 2025-05-01 DIAGNOSIS — M77.42 METATARSALGIA OF BOTH FEET: ICD-10-CM

## 2025-05-01 DIAGNOSIS — G57.61 MORTON'S METATARSALGIA, NEURALGIA, OR NEUROMA, RIGHT: ICD-10-CM

## 2025-05-01 DIAGNOSIS — E11.42 TYPE 2 DIABETES MELLITUS WITH DIABETIC POLYNEUROPATHY, WITH LONG-TERM CURRENT USE OF INSULIN: ICD-10-CM

## 2025-05-01 PROCEDURE — 99999 PR PBB SHADOW E&M-EST. PATIENT-LVL IV: CPT | Mod: PBBFAC,,, | Performed by: PODIATRIST

## 2025-05-01 PROCEDURE — 99214 OFFICE O/P EST MOD 30 MIN: CPT | Mod: PBBFAC,PN | Performed by: PODIATRIST
